# Patient Record
Sex: MALE | Race: WHITE
[De-identification: names, ages, dates, MRNs, and addresses within clinical notes are randomized per-mention and may not be internally consistent; named-entity substitution may affect disease eponyms.]

---

## 2018-11-20 ENCOUNTER — HOSPITAL ENCOUNTER (EMERGENCY)
Dept: HOSPITAL 14 - H.ER | Age: 33
Discharge: HOME | End: 2018-11-20
Payer: MEDICAID

## 2018-11-20 VITALS
DIASTOLIC BLOOD PRESSURE: 57 MMHG | RESPIRATION RATE: 16 BRPM | TEMPERATURE: 98.4 F | SYSTOLIC BLOOD PRESSURE: 111 MMHG | OXYGEN SATURATION: 99 % | HEART RATE: 98 BPM

## 2018-11-20 DIAGNOSIS — M43.6: Primary | ICD-10-CM

## 2018-11-20 PROCEDURE — 72070 X-RAY EXAM THORAC SPINE 2VWS: CPT

## 2018-11-20 PROCEDURE — 72040 X-RAY EXAM NECK SPINE 2-3 VW: CPT

## 2018-11-20 PROCEDURE — 99283 EMERGENCY DEPT VISIT LOW MDM: CPT

## 2018-11-20 PROCEDURE — 96372 THER/PROPH/DIAG INJ SC/IM: CPT

## 2018-11-20 NOTE — RAD
Date of service: 



11/20/2018



PROCEDURE:  Cervical Spine Radiographs.



HISTORY:

Pain. 



COMPARISON:

None available.



FINDINGS:



BONES:





There is normal alignment of the cervical vertebral bodies.  There is 

reversal of normal cervical lordosis.  Vertebral height is normal. 

Bone mineralization is normal. There is no acute fracture or 

traumatic anterior listhesis. The craniocervical junction is normal.  

The atlantoaxial joint normal. 



DISC SPACES:

There is moderate degenerative disc disease at C6-7 with anterior 

spurring, severe reduced disc height and facet arthropathy.  The 

remaining disc heights are maintained. 



SOFT TISSUES:

Normal. No prevertebral soft tissue swelling. 



OTHER FINDINGS:

None.



IMPRESSION:

Moderate degenerative disc disease at C6-7.



Reversal of normal cervical lordosis may be related to muscle spasm.

## 2018-11-20 NOTE — RAD
Date of service: 



11/20/2018



HISTORY:

pain







COMPARISON:

No prior.



FINDINGS:



BONES:

There is normal alignment of the thoracic vertebral bodies.  There is 

normal thoracic kyphosis.  Bone mineralization is normal.  There is 

no acute fracture. 



DISC SPACES:

Normal.



SOFT TISSUES:

Normal.



OTHER FINDINGS:

None.



IMPRESSION:

Normal radiographs of the thoracic spine.

## 2018-11-20 NOTE — ED PDOC
HPI: Back


Time Seen by Provider: 11/20/18 17:09


Chief Complaint (Nursing): Back Pain


History Per: Patient


Additional Complaint(s): 





Pt. states for the past week he's had neck and upper back pain described as a 

stiffness. States he's been getting this pain since 2012 intermittently but has 

never sought medical attention till today. Denies trauma, chest pain, SOB, 

hemoptysis, fever, cough, hematuria, incontinence. 





Past Medical History


Reviewed: Historical Data, Nursing Documentation, Vital Signs


Vital Signs: 


                                Last Vital Signs











Temp  98.4 F   11/20/18 16:54


 


Pulse  98 H  11/20/18 16:54


 


Resp  16   11/20/18 16:54


 


BP  111/57 L  11/20/18 16:54


 


Pulse Ox  99   11/20/18 16:54














- Surgical History


Surgical History: No Surg Hx





- Family History


Family History: States: No Known Family Hx





- Home Medications


Home Medications: 


                                Ambulatory Orders











 Medication  Instructions  Recorded


 


Naproxen [Naprosyn] 500 mg PO BID PRN #30 tab 06/22/15


 


Cyclobenzaprine [Cyclobenzaprine 10 mg PO Q8 PRN #10 tab 11/20/18





HCl]  


 


Naproxen [Naprosyn] 500 mg PO BID PRN #10 tab 11/20/18














- Allergies


Allergies/Adverse Reactions: 


                                    Allergies











Allergy/AdvReac Type Severity Reaction Status Date / Time


 


No Known Allergies Allergy   Verified 11/20/18 16:54














Review of Systems


ROS Statement: Except As Marked, All Systems Reviewed And Found Negative


Musculoskeletal: Positive for: Back Pain





Physical Exam





- Physical Exam


Appears: Positive for: Well, Non-toxic, No Acute Distress


Skin: Positive for: Normal Color, Warm.  Negative for: Rash


Eye Exam: Positive for: Normal appearance


Cardiovascular/Chest: Positive for: Regular Rate, Rhythm, Chest Non Tender


Respiratory: Positive for: Normal Breath Sounds.  Negative for: Respiratory 

Distress


Gastrointestinal/Abdominal: Positive for: Soft.  Negative for: Tenderness


Back: Positive for: Normal Inspection, Decreased ROM, Muscle Spasm (b/l 

paracervical and parathoracic).  Negative for: L CVA Tenderness, R CVA 

Tenderness, Vertebral Tenderness (entire spine including c-spine)


Extremity: Positive for: Other (equal  strenght b/l)


Neurologic/Psych: Positive for: Alert, Oriented (x3).  Negative for: Aphasia, 

Facial Droop





- ECG


O2 Sat by Pulse Oximetry: 99





- Radiology


X-Ray: Read By Radiologist (C-spine, thoracic spine)


X-Ray Interpretation: Other (moderate degenerate disc disease C6-7; reversal 

lordosis likely related to muscle spasm)





- Progress


Re-evaluation Time: 19:13 (Advised to f/u with [facility] for possible MRI. )


Condition: Re-examined, Improved





Disposition





- Clinical Impression


Clinical Impression: 


 Torticollis








- Patient ED Disposition


Is Patient to be Admitted: No





- Disposition


Referrals: 


[facility] [Outside]


Disposition: Routine/Home


Disposition Time: 19:13


Condition: IMPROVED


Additional Instructions: 


FOLLOW UP WITH [facility] FOR FURTHER EVALUATION BUT RETURN TO ED IMMEDIATELY IF 

SYMPTOMS WORSEN.





HENRY VERDE, thank you for letting us take care of you today. Your provider was 

Marva Liang MD and you were treated for NECK PAIN. The emergency 

medical care you received today was directed at your acute symptoms. If you were

 prescribed any medication, please fill it and take as directed. It may take 

several days for your symptoms to resolve. Return to the Emergency Department if

 your symptoms worsen, do not improve, or if you have any other problems.





Please contact your doctor or call one of the physicians/clinics you have been 

referred to that are listed on the Patient Visit Information form that is inclu

ded in your discharge packet. Bring any paperwork you were given at discharge 

with you along with any medications you are taking to your follow up visit. Our 

treatment cannot replace ongoing medical care by a primary care provider outside

 of the emergency department.





Thank you for allowing the [email or web address] team to be part of your care today.








If you had an X-Ray or CT scan: A Radiologist will review the ED reading if any 

change in treatment is needed we will contact you.***





If you had a blood, urine, or wound culture: It will take several days for the 

results, if any change in treatment is needed we will contact you.***





If you had an STI test: It will take 48 hours for the results. Please call after

 1 week if you have not heard back.***


Prescriptions: 


Cyclobenzaprine [Cyclobenzaprine HCl] 10 mg PO Q8 PRN #10 tab


 PRN Reason: Muscle Spasm


Naproxen [Naprosyn] 500 mg PO BID PRN #10 tab


 PRN Reason: Pain


Instructions:  Torticollis (DC)


Forms:  [facility] (English)

## 2018-11-26 ENCOUNTER — HOSPITAL ENCOUNTER (INPATIENT)
Dept: HOSPITAL 14 - H.ER | Age: 33
LOS: 4 days | Discharge: NURSING, REHAB OR LONG-TERM CARE | DRG: 836 | End: 2018-11-30
Attending: INTERNAL MEDICINE | Admitting: INTERNAL MEDICINE
Payer: MEDICAID

## 2018-11-26 VITALS — BODY MASS INDEX: 18.9 KG/M2

## 2018-11-26 DIAGNOSIS — M43.6: ICD-10-CM

## 2018-11-26 DIAGNOSIS — M46.42: ICD-10-CM

## 2018-11-26 DIAGNOSIS — R32: ICD-10-CM

## 2018-11-26 DIAGNOSIS — F17.210: ICD-10-CM

## 2018-11-26 DIAGNOSIS — G06.1: Primary | ICD-10-CM

## 2018-11-26 DIAGNOSIS — M46.22: ICD-10-CM

## 2018-11-26 DIAGNOSIS — F12.90: ICD-10-CM

## 2018-11-26 DIAGNOSIS — G82.20: ICD-10-CM

## 2018-11-26 LAB
ALBUMIN SERPL-MCNC: 3.6 G/DL (ref 3.5–5)
ALBUMIN/GLOB SERPL: 0.9 {RATIO} (ref 1–2.1)
ALT SERPL-CCNC: 26 U/L (ref 21–72)
APTT BLD: 26.1 SECONDS (ref 25.6–37.1)
AST SERPL-CCNC: 46 U/L (ref 17–59)
BASOPHILS # BLD AUTO: 0 K/UL (ref 0–0.2)
BASOPHILS NFR BLD: 0.3 % (ref 0–2)
BILIRUB UR-MCNC: NEGATIVE MG/DL
BUN SERPL-MCNC: 11 MG/DL (ref 9–20)
CALCIUM SERPL-MCNC: 9.1 MG/DL (ref 8.4–10.2)
COLOR UR: (no result)
EOSINOPHIL # BLD AUTO: 0.2 K/UL (ref 0–0.7)
EOSINOPHIL NFR BLD: 1.8 % (ref 0–4)
ERYTHROCYTE [DISTWIDTH] IN BLOOD BY AUTOMATED COUNT: 13.2 % (ref 11.5–14.5)
GFR NON-AFRICAN AMERICAN: > 60
GLUCOSE UR STRIP-MCNC: (no result) MG/DL
HGB BLD-MCNC: 12.4 G/DL (ref 12–18)
INR PPP: 1.2
LEUKOCYTE ESTERASE UR-ACNC: (no result) LEU/UL
LYMPHOCYTE: 13 % (ref 20–50)
LYMPHOCYTES # BLD AUTO: 1.1 K/UL (ref 1–4.3)
LYMPHOCYTES NFR BLD AUTO: 9.4 % (ref 20–40)
MCH RBC QN AUTO: 30.5 PG (ref 27–31)
MCHC RBC AUTO-ENTMCNC: 32.6 G/DL (ref 33–37)
MCV RBC AUTO: 93.4 FL (ref 80–94)
MONOCYTE: 11 % (ref 0–10)
MONOCYTES # BLD: 1 K/UL (ref 0–0.8)
MONOCYTES NFR BLD: 8 % (ref 0–10)
NEUTROPHILS # BLD: 9.8 K/UL (ref 1.8–7)
NEUTROPHILS NFR BLD AUTO: 76 % (ref 42–75)
NEUTROPHILS NFR BLD AUTO: 80.5 % (ref 50–75)
NRBC BLD AUTO-RTO: 0 % (ref 0–0)
OVALOCYTES BLD QL SMEAR: SLIGHT
PH UR STRIP: 5 [PH] (ref 5–8)
PLATELET # BLD EST: NORMAL 10*3/UL
PLATELET # BLD: 370 K/UL (ref 130–400)
PMV BLD AUTO: 7.3 FL (ref 7.2–11.7)
PROT UR STRIP-MCNC: NEGATIVE MG/DL
PROTHROMBIN TIME: 14.1 SECONDS (ref 9.8–13.1)
RBC # BLD AUTO: 4.06 MIL/UL (ref 4.4–5.9)
RBC # UR STRIP: NEGATIVE /UL
SP GR UR STRIP: 1.03 (ref 1–1.03)
SQUAMOUS EPITHIAL: 1 /HPF (ref 0–5)
TEARDROP CELLS: SLIGHT
TOTAL CELLS COUNTED BLD: 100
URINE CLARITY: (no result)
UROBILINOGEN UR-MCNC: 4 MG/DL (ref 0.2–1)
WBC # BLD AUTO: 12.2 K/UL (ref 4.8–10.8)

## 2018-11-26 RX ADMIN — POTASSIUM CHLORIDE, DEXTROSE MONOHYDRATE AND SODIUM CHLORIDE SCH MLS/HR: 150; 5; 450 INJECTION, SOLUTION INTRAVENOUS at 21:04

## 2018-11-26 RX ADMIN — DEXAMETHASONE SODIUM PHOSPHATE SCH MG: 4 INJECTION, SOLUTION INTRAMUSCULAR; INTRAVENOUS at 21:06

## 2018-11-26 NOTE — CP.PCM.CON
History of Present Illness





- History of Present Illness


History of Present Illness: 





dictated





paraplegia x 4 days - c4 -7 epidural abscess


rec posterior cervical decompression as best chance to preserve upper extremity 

function


do not believe LE/B/B function can be restored at this late stage





Pt wants to think over recommendation/disc with family


await decision


agree with Abcs,steroids,collar in interem





Past Patient History





- Past Social History


Alcohol: Social


Drugs: Other (hx of IVDA)





- CARDIAC


Hx Cardiac Disorders: No





- PULMONARY


Hx Respiratory Disorders: No





- PSYCHIATRIC


Hx Substance Use: Yes





- SURGICAL HISTORY


Hx Surgeries: Yes


Other/Comment: hernia surgery





Meds


Allergies/Adverse Reactions: 


                                    Allergies











Allergy/AdvReac Type Severity Reaction Status Date / Time


 


No Known Allergies Allergy   Verified 11/20/18 16:54














Results





- Vital Signs


Recent Vital Signs: 


                                Last Vital Signs











Temp  99.3 F   11/26/18 14:35


 


Pulse  60   11/26/18 14:35


 


Resp  18   11/26/18 14:35


 


BP  113/62   11/26/18 14:35


 


Pulse Ox  96   11/26/18 15:17














- Labs


Result Diagrams: 


                                 11/26/18 10:28





                                 11/26/18 10:28


Labs: 


                         Laboratory Results - last 24 hr











  11/26/18 11/26/18 11/26/18





  10:28 10:28 10:28


 


WBC  12.2 H  


 


RBC  4.06 L  


 


Hgb  12.4  


 


Hct  37.9  


 


MCV  93.4  


 


MCH  30.5  


 


MCHC  32.6 L  


 


RDW  13.2  


 


Plt Count  370  


 


MPV  7.3  


 


Neut % (Auto)  80.5 H  


 


Lymph % (Auto)  9.4 L  


 


Mono % (Auto)  8.0  


 


Eos % (Auto)  1.8  


 


Baso % (Auto)  0.3  


 


Neut # (Auto)  9.8 H  


 


Lymph # (Auto)  1.1  


 


Mono # (Auto)  1.0 H  


 


Eos # (Auto)  0.2  


 


Baso # (Auto)  0.0  


 


Neutrophils % (Manual)  76 H  


 


Lymphocytes % (Manual)  13 L  


 


Monocytes % (Manual)  11 H  


 


Platelet Estimate  Normal  


 


Tear Drop Cells  Slight  


 


Ovalocytes  Slight  


 


ESR   


 


PT    14.1 H


 


INR    1.2


 


APTT    26.1


 


Sodium   137 


 


Potassium   4.0 


 


Chloride   101 


 


Carbon Dioxide   26 


 


Anion Gap   14 


 


BUN   11 


 


Creatinine   0.8 


 


Est GFR ( Amer)   > 60 


 


Est GFR (Non-Af Amer)   > 60 


 


Random Glucose   106 


 


Calcium   9.1 


 


Total Bilirubin   0.5 


 


AST   46 


 


ALT   26 


 


Alkaline Phosphatase   56 


 


Total Protein   7.5 


 


Albumin   3.6 


 


Globulin   3.9 


 


Albumin/Globulin Ratio   0.9 L 


 


Urine Color   


 


Urine Clarity   


 


Urine pH   


 


Ur Specific Gravity   


 


Urine Protein   


 


Urine Glucose (UA)   


 


Urine Ketones   


 


Urine Blood   


 


Urine Nitrate   


 


Urine Bilirubin   


 


Urine Urobilinogen   


 


Ur Leukocyte Esterase   


 


Urine RBC (Auto)   


 


Urine Microscopic WBC   


 


Ur Squamous Epith Cells   


 


Urine Opiates Screen   


 


Urine Methadone Screen   


 


Ur Barbiturates Screen   


 


Ur Phencyclidine Scrn   


 


Ur Amphetamines Screen   


 


U Benzodiazepines Scrn   


 


U Oth Cocaine Metabols   


 


U Cannabinoids Screen   














  11/26/18 11/26/18 11/26/18





  14:00 14:38 14:38


 


WBC   


 


RBC   


 


Hgb   


 


Hct   


 


MCV   


 


MCH   


 


MCHC   


 


RDW   


 


Plt Count   


 


MPV   


 


Neut % (Auto)   


 


Lymph % (Auto)   


 


Mono % (Auto)   


 


Eos % (Auto)   


 


Baso % (Auto)   


 


Neut # (Auto)   


 


Lymph # (Auto)   


 


Mono # (Auto)   


 


Eos # (Auto)   


 


Baso # (Auto)   


 


Neutrophils % (Manual)   


 


Lymphocytes % (Manual)   


 


Monocytes % (Manual)   


 


Platelet Estimate   


 


Tear Drop Cells   


 


Ovalocytes   


 


ESR  61 H  


 


PT   


 


INR   


 


APTT   


 


Sodium   


 


Potassium   


 


Chloride   


 


Carbon Dioxide   


 


Anion Gap   


 


BUN   


 


Creatinine   


 


Est GFR ( Amer)   


 


Est GFR (Non-Af Amer)   


 


Random Glucose   


 


Calcium   


 


Total Bilirubin   


 


AST   


 


ALT   


 


Alkaline Phosphatase   


 


Total Protein   


 


Albumin   


 


Globulin   


 


Albumin/Globulin Ratio   


 


Urine Color   Paloma 


 


Urine Clarity   Slighty-cloudy 


 


Urine pH   5.0 


 


Ur Specific Gravity   1.026 


 


Urine Protein   Negative 


 


Urine Glucose (UA)   Neg 


 


Urine Ketones   Trace 


 


Urine Blood   Negative 


 


Urine Nitrate   Negative 


 


Urine Bilirubin   Negative 


 


Urine Urobilinogen   4.0 


 


Ur Leukocyte Esterase   Neg 


 


Urine RBC (Auto)   1 


 


Urine Microscopic WBC   2 


 


Ur Squamous Epith Cells   1 


 


Urine Opiates Screen    Positive H


 


Urine Methadone Screen    Negative


 


Ur Barbiturates Screen    Negative


 


Ur Phencyclidine Scrn    Negative


 


Ur Amphetamines Screen    Negative


 


U Benzodiazepines Scrn    Positive


 


U Oth Cocaine Metabols    Negative


 


U Cannabinoids Screen    Positive H

## 2018-11-26 NOTE — MRI
Date of service: 



11/26/2018



PROCEDURE:  MR CERVICAL SPINE WITHOUT CONTRAST



HISTORY:

BLE weakness



COMPARISON:

None available. 



TECHNIQUE:

Multiecho multiplanar sequences were performed through the cervical 

spine without the use of intravenous contrast. Examination was 

delayed to completion due to pain (primarily in the neck but also in 

the pelvis) with patient laying on the MR table with patient 

receiving pain control in the middle of the initial examination. 



FINDINGS:

There is a subtle reversal of the cervical curvature without fracture 

or spondylolisthesis appreciated.  Diffuse disc desiccation is 

identified however, fluid is seen in the posterior and central 

portion of the C6-7 intervertebral disc with edema associated with 

the surrounding endplates posteriorly, at the same level as the 

fluid.  Anterior disc and endplates appear unaffected at this level.  

Fluid is suggested at the posterior epidural space, and mildly 

anteriorly in a pattern suspicious for discitis osteomyelitis.  

Posterior epidural fluid extends as cephalad potentially as C4 and as 

caudad as T1. 



C2-C3:

No disc herniation, spinal canal stenosis or neural foraminal 

narrowing. 



C3-C4:

No  disc herniation, spinal canal stenosis or neural foraminal 

narrowing. 



C4-C5:

No disc herniation, spinal canal stenosis or neural foraminal 

narrowing. 



C5-C6:

While there is no disc herniation or neural foraminal stenosis 

identified, epidural fluid or soft tissue is seen circumferentially 

but more posteriorly and toward the left and anteriorly.   This is 

likely reflective of epidural abscess related to C6-7 discitis 

osteomyelitis.  The cervical cord appears flattened at the left side 

more so than the right.  



C6-C7:

No disc herniation or neural foraminal stenosis.  Presumed fluid or 

epidural soft tissue impresses the thecal sac and the cervical cord 

greater the left and right sides stenosing the central canal.  Trace 

reactive cord signal changes associated. There is a potential annular 

tear posteriorly at C6-7 disc though this is not definite. 



C7-T1:

No disc herniation although fluid/soft tissue impresses the cord at 

the upper to mid T1 level with this finding minimal at the inferior 

T1 endplate level.  No neural foraminal stenosis. 



OTHER FINDINGS:

None. 



IMPRESSION:

1. C6-7 intervertebral disc fluid with edema at C6 and C7 vertebral 

bodies related to the mid to posterior endplate distribution 

suspicious for discitis osteomyelitis. Epidural fluid is identified 

more posteriorly than anteriorly from at least C4-T1 levels with cord 

compressed at C6 and C7. Contrast MRI cervical spine can be utilized 

for added characterization. 



2. No definitive disc herniation appreciable though an annular tear 

is not excluded at the C6-7 disc posteriorly. 



3. Mild reversal cervical curvature. Definitive fracture or 

spondylolisthesis appreciable. 



Findings discussed with Dr. Martinez with written down and read back 

verification 11/26/2018 1:25 p.m..

## 2018-11-26 NOTE — RAD
Date of service: 



11/26/2018



HISTORY:

 Pre-op 



COMPARISON:

No prior. 



FINDINGS:



LUNGS:

No active pulmonary disease.



PLEURA:

No significant pleural effusion identified, no pneumothorax apparent.



CARDIOVASCULAR:

No atherosclerotic calcification present



Normal.



OSSEOUS STRUCTURES:

No significant abnormalities.



VISUALIZED UPPER ABDOMEN:

Normal.



OTHER FINDINGS:

None.



IMPRESSION:

No active disease.

## 2018-11-26 NOTE — CARD
--------------- APPROVED REPORT --------------





Date of service: 11/26/2018



EKG Measurement

Heart [id number]

MS [id number]

[id number]

[id number]

[id number]



<Conclusion>

Sinus bradycardia

Otherwise normal ECG

## 2018-11-26 NOTE — ED PDOC
HPI: Neurologic





- General


Time Seen by Provider: 11/26/18 10:03


Chief Complaint (Nursing): Lower Extremity Problem/Injury


Chief Complaint (Provider): bilateral hand weakness, unable to ambulate


Source: patient


Exam Limitations: no limitations





- History of Present Illness


Timing/Duration: other (x4 days)


Associated Symptoms: weakness (b/l hands), other


Allergies/Adverse Reactions: 


Allergies





No Known Allergies Allergy (Verified 11/20/18 16:54)


   








Home Medications: 


Ambulatory Orders





Cyclobenzaprine [Cyclobenzaprine HCl] 10 mg PO Q8 PRN #10 tab 11/20/18 


Naproxen [Naprosyn] 500 mg PO BID PRN #10 tab 11/20/18 








Additional Complaint(s): 





Fausto Ken is a 33 year old male, with no significant past medical history, who

 was brought to the emergency department by EMS complaining of weakness in both 

hands and inability to ambulate onset for x4 days. Patient also reports urinary 

incontinence and urge to defecate but not being able to, last bowel movement was

 a week ago. Patient states he was seen here a week ago for neck pain. He was 

given a shot and Valium in the ER, and was sent home with muscle relaxants. 

However, patient reports symptoms got progressively worst and has not been able 

to ambulate since Friday. He denies any trauma, headache or other medical 

complaints.





PMD: Kareen Penny





NIHSS Stroke Scale





- Date/Time Evaluation Performed


Date Performed: 11/26/18





Past Medical History


Reviewed: Historical Data, Nursing Documentation, Vital Signs


Vital Signs: 





                                Last Vital Signs











Temp  98.4 F   11/26/18 09:52


 


Pulse  70   11/26/18 09:52


 


Resp  19   11/26/18 09:52


 


BP  127/67   11/26/18 09:52


 


Pulse Ox  96   11/26/18 09:52














- Medical History


PMH: No Chronic Diseases





- Surgical History


Surgical History: Hernia Repair (umbilical)





- Family History


Family History: States: Unknown Family Hx





- Social History


Current smoker - smoking cessation education provided: Yes


Alcohol: Social


Drugs: Other (hx of IVDA)





- Home Medications


Home Medications: 


                                Ambulatory Orders











 Medication  Instructions  Recorded


 


Cyclobenzaprine [Cyclobenzaprine 10 mg PO Q8 PRN #10 tab 11/20/18





HCl]  


 


Naproxen [Naprosyn] 500 mg PO BID PRN #10 tab 11/20/18














- Allergies


Allergies/Adverse Reactions: 


                                    Allergies











Allergy/AdvReac Type Severity Reaction Status Date / Time


 


No Known Allergies Allergy   Verified 11/20/18 16:54














Review of Systems


ROS Statement: Except As Marked, All Systems Reviewed And Found Negative


Gastrointestinal: Positive for: Constipation


Genitourinary Male: Positive for: Incontinence (urinary)


Musculoskeletal: Positive for: Neck Pain


Neurological: Positive for: Weakness (b/l hands), Other (unable to ambulate).  

Negative for: Headache





Physical Exam





- Reviewed


Nursing Documentation Reviewed: Yes


Vital Signs Reviewed: Yes





- Physical Exam


Appears: Positive for: No Acute Distress


Head Exam: Positive for: ATRAUMATIC, NORMAL INSPECTION, NORMOCEPHALIC


Skin: Positive for: Normal Color, Warm, Dry


Eye Exam: Positive for: Normal appearance, EOMI, PERRL


ENT: Positive for: Normal ENT Inspection


Neck: Positive for: Normal, Painless ROM, Supple


Cardiovascular/Chest: Positive for: Regular Rate, Rhythm.  Negative for: Murmur


Respiratory: Positive for: Normal Breath Sounds.  Negative for: Respiratory 

Distress


Gastrointestinal/Abdominal: Positive for: Normal Exam, Soft.  Negative for: 

Tenderness, Guarding, Rebound


Back: Positive for: Normal Inspection.  Negative for: L CVA Tenderness, R CVA 

Tenderness, Vertebral Tenderness


Extremity: Negative for: Deformity


Neurologic/Psych: Positive for: Alert, Oriented, Motor/Sensory Deficits 

(Decreased  strength b/l. Muscle weakness to bilateral lower extremities), 

Other (Positive saddle anesthesia)





- Laboratory Results


Result Diagrams: 


                                 11/26/18 10:28





                                 11/26/18 10:28





- ECG


O2 Sat by Pulse Oximetry: 96 (RA)


Pulse Ox Interpretation: Normal





- Critical Care


Total Time (In Min): 75


Documented Critical Care: Time excludes all time spent performint seperately 

billable procedures





Medical Decision Making


Medical Decision Making: 





Time: 10:03


Initial Impression:





Initial Plan:





--CMP


--Urine dipstick


--CBC w/ differential


--PTT


--PT


--Spinal canal cervical w/o contrast [MRI]


--Spinal canal lumbar w/o contrast [MRI]


--Spinal canal thoracic w/o contrast [MRI]


--Urinalysis 


--Reevaluation





10:30


-Spoke with Dr. Anderson, she recommends a cervical collar and getting an MRI of the 

spine. 





11:15


-Patient was brought back from MRI, he is refusing secondary to neck pain. 

Ordered 4mg of Morphine.





11:30


-Patient refusing MRI again stating he is not sure. He agreed to try for the MRI

 given Ativan 2 mg.


-Pt. placed on monitor.





_______________________________________________________________________________

___________________


13:31


Cervical spine MRI


FINDINGS:


There is a subtle reversal of the cervical curvature without fracture or 

spondylolisthesis appreciated.  Diffuse disc desiccation is identified however, 

fluid is seen in the posterior and central portion of the C6-7 intervertebral 

disc with edema associated with the surrounding endplates posteriorly, at the 

same level as the fluid.  Anterior disc and endplates appear unaffected at this 

level.  Fluid is suggested at the posterior epidural space, and mildly 

anteriorly in a pattern suspicious for discitis osteomyelitis.  Posterior 

epidural fluid extends as cephalad potentially as C4 and as caudad as T1. 





C2-C3:


No disc herniation, spinal canal stenosis or neural foraminal narrowing. 





C3-C4:


No  disc herniation, spinal canal stenosis or neural foraminal narrowing. 





C4-C5:


No disc herniation, spinal canal stenosis or neural foraminal narrowing. 





C5-C6:


While there is no disc herniation or neural foraminal stenosis identified, 

epidural fluid or soft tissue is seen circumferentially but more posteriorly and

 toward the left and anteriorly.   This is likely reflective of epidural abscess

 related to C6-7 discitis osteomyelitis.  The cervical cord appears flattened at

 the left side more so than the right.  





C6-C7:


No disc herniation or neural foraminal stenosis.  Presumed fluid or epidural 

soft tissue impresses the thecal sac and the cervical cord greater the left and 

right sides stenosing the central canal.  Trace reactive cord signal changes 

associated. There is a potential annular tear posteriorly at C6-7 disc though 

this is not definite. 





C7-T1:


No disc herniation although fluid/soft tissue impresses the cord at the upper to

 mid T1 level with this finding minimal at the inferior T1 endplate level.  No 

neural foraminal stenosis. 





OTHER FINDINGS:


None. 





IMPRESSION:


1. C6-7 intervertebral disc fluid with edema at C6 and C7 vertebral bodies 

related to the mid to posterior endplate distribution suspicious for discitis 

osteomyelitis. Epidural fluid is identified more posteriorly than anteriorly 

from at least C4-T1 levels with cord compressed at C6 and C7. Contrast MRI 

cervical spine can be utilized for added characterization. 





2. No definitive disc herniation appreciable though an annular tear is not 

excluded at the C6-7 disc posteriorly. 





3. Mild reversal cervical curvature. Definitive fracture or spondylolisthesis 

appreciable. 





Findings discussed with Dr. Martinez with written down and read back verification 

11/26/2018 1:25 p.m..





_______________________________________________

_______________________________________________________





13:30


-Paged Dr. Esqueda, ID.





13:35


-Paged Dr. Monzon, neurosurgery.





________________________________________________________________________

_______________________________


13:38


Lumbar Spine MRI


FINDINGS:


Normal lumbar lordosis.





Vertebral body heights are preserved.





Marrow signal unremarkable.





Conus medullaris unremarkable at the level of L2 vertebral body. 





Prevertebral and paraspinal soft tissues are unremarkable.





T12-L1:


No disc herniation, spinal canal stenosis or neural foraminal narrowing. 





L1-2:


No disc herniation, spinal canal stenosis or neural foraminal narrowing. 





L2-3:


No disc herniation, spinal canal stenosis or neural foraminal narrowing. 





L3-4:


No disc herniation, spinal canal stenosis or neural foraminal narrowing. 





L4-5:


No disc herniation, spinal canal stenosis or neural foraminal narrowing.  

Minimal disc bulge and mild-to-moderate facet joint degenerative change. 





L5-S1:


There is a small right paracentral disc protrusion without indenting the ventral

 thecal sac or causing significant stenosis.  Mild-to-moderate facet joint 

degenerative changes are seen symmetrically.  No definite neural foraminal 

stenosis bilaterally. 





OTHER FINDINGS:


None. 





IMPRESSION:


1. Small right paracentral disc protrusion L5-S1 without significant stenosis 

resulting. 





2. Minimal disc bulge L4-5 without stenosis. 





3. Widely patent central canal and bilateral neural foramina throughout.





 

________________________________________________________________________________


_______________________








13:40


-Spoke with Dr. Monozn, recommends ESR, CRP, preop orders.








__________________________

______________________________________________________________________________


13:42


Thoracic Spine MRI


FINDINGS:





ALIGNMENT:


Normal thoracic spinal alignment. Normal thoracic kyphosis.





VERTEBRA:


Vertebral body height are preserved.  No fracture appreciable.





MARROW:


Marrow signal unremarkable.





PARASPINAL SOFT TISSUES:


Unremarkable.





CORD:


Unremarkable thoracic cord. No volume loss, signal abnormality or syrinx.





DISCS:


No disc herniation, spinal canal stenosis, or neuroforaminal narrowing.





OTHER FINDINGS:


Edema is identified at the C7 vertebral body with the C6 not included in this 

exam.  Please see separate cervical spine MRI report 11/26/2018 for added 

details.





IMPRESSION:


Unremarkable non-contrast enhanced MRI of the thoracic spine.  Incidental edema 

at C7 vertebral body is detailed in separate cervical spine MRI also performed 

11/26/2018.  Please see separate report.





Findings discussed with Dr. Martinez with written down and read back verification 

11/26/2018 1:44 p.m..


 

________________________________________________________________________________


______________________








14:20


-Case discussed with Dr. Esqueda, recommends Vancomycin 1 g q8h and Cefepime 2 g 

q8h.








14:36


CXR


FINDINGS:





LUNGS:


No active pulmonary disease.





PLEURA:


No significant pleural effusion identified, no pneumothorax apparent.





CARDIOVASCULAR:


No atherosclerotic calcification present





Normal.





OSSEOUS STRUCTURES:


No significant abnormalities.





VISUALIZED UPPER ABDOMEN:


Normal.





OTHER FINDINGS:


None.





IMPRESSION:


No active disease.











-------

------------------------------------------------------------------------------





Scribe Attestation:


Documented by Binu Ambrosio, acting as a scribe for Magdalena Martinez MD.





Provider Scribe Attestation:


All medical record entries made by the Scribe were at my direction and per

sonally dictated by me. I have reviewed the chart and agree that the record 

accurately reflects my personal performance of the history, physical exam, 

medical decision making, and the department course for this patient. I have also

 personally directed, reviewed, and agree with the discharge instructions and 

disposition.





Disposition





- Clinical Impression


Clinical Impression: 


 Epidural abscess, Discitis of cervical region, Osteomyelitis of cervical spine








- Patient ED Disposition


Is Patient to be Admitted: Yes





- Disposition


Disposition Time: 14:15


Condition: GUARDED





- Pt Status Changed To:


Hospital Disposition Of: Inpatient





- Admit Certification


Admit to Inpatient:: After my assessment, the patient will require 

hospitalization for at least two midnights.  This is because of the severity of 

symptoms shown, intensity of services needed, and/or the medical risk in this 

patient being treated as an outpatient.





- POA


Present On Arrival: None

## 2018-11-26 NOTE — MRI
Date of service: 



11/26/2018



PROCEDURE:  MR THORACIC SPINE WITHOUT CONTRAST



HISTORY:

BLE weakness



COMPARISON:

None available. 



TECHNIQUE:

Multiecho multiplanar sequences were performed through the thoracic 

spine without the use of intravenous contrast.



FINDINGS:



ALIGNMENT:

Normal thoracic spinal alignment. Normal thoracic kyphosis.



VERTEBRA:

Vertebral body height are preserved.  No fracture appreciable.



MARROW:

Marrow signal unremarkable.



PARASPINAL SOFT TISSUES:

Unremarkable.



CORD:

Unremarkable thoracic cord. No volume loss, signal abnormality or 

syrinx.



DISCS:

No disc herniation, spinal canal stenosis, or neuroforaminal 

narrowing.



OTHER FINDINGS:

Edema is identified at the C7 vertebral body with the C6 not included 

in this exam.  Please see separate cervical spine MRI report 

11/26/2018 for added details.



IMPRESSION:

Unremarkable non-contrast enhanced MRI of the thoracic spine.  

Incidental edema at C7 vertebral body is detailed in separate 

cervical spine MRI also performed 11/26/2018.  Please see separate 

report.



Findings discussed with Dr. Martinez with written down and read back 

verification 11/26/2018 1:44 p.m..

## 2018-11-26 NOTE — CP.CCUPN
CCU Subjective





- Physician Review


Events Since Last Encounter (Free Text): 





11/26/18 18:42


The patient was Seen and examined by me at the bedside


Medical records reviewed and Management issues were discussed and formulated 

with the house staff.


Events reviewed


 


Mr Ken is a 33 Years old active smoker Male with no significant Past medical 

history 


Who was brought to the Emergency department by EMS with complaint of Bilateral 

lower extremities Weakness, numbness, weakness in both hands 


and unable to ambulate for the past 4 days.


Patient also reports urinary incontinence and urge to defecate but not being 

able to, last bowel movement was a week ago. 


Also admits fall at home


Patient was seen in the ER week ago for neck pain, he was given a shot, NSAIDs 

and muscle relaxant. 


Pt placed on  a cervical collar and getting and underwent MRI of the spine.


In the ER, he underwent MRI revealed C6-7 intervertebral disc fluid with edema 

at C6 and C7 vertebral bodies related to the mid to posterior endplate 

distribution suspicious for discitis osteomyelitis. 


Epidural fluid is identified more posteriorly than anteriorly from at least C4-

T1 levels with cord compressed at C6 and C7.  


Patient was evaluated by neurology, ID and neurosurgery


Pt was started on IV Vanco and Cefapime 


Awake, Comfortable, NAD


Pt AAO x3. 


Afebrile, NSR on the monitor








Social Hx:  


Tobacco - Active smoker 1PPD x 8 years


Alcohol - occasional


Illicit Drugs - No IVDA, + marijuana





CCU Objective





- Vital Signs / Intake & Output


Vital Signs (Last 4 hours): 


Vital Signs











  Pulse Resp BP Pulse Ox


 


 11/26/18 18:14  71  16  111/66 


 


 11/26/18 18:12  71  16  111/66  99


 


 11/26/18 16:47     96











Intake and Output (Last 8hrs): 


                                 Intake & Output











 11/26/18 11/26/18 11/26/18





 06:59 14:59 22:59


 


Weight  173 lb 














- Physical Exam


Head: Positive for: Atraumatic, Normocephalic.  Negative for: Tenderness, 

Contusion, Swelling


Pupils: Positive for: PERRL.  Negative for: Sluggish, Non-Reactive, Pinpoint


Extroacular Muscles: Positive for: EOMI


Conjunctiva: Positive for: Normal.  Negative for: Injected, Icteric


Neck: Positive for: Meningeal Signs, MIDLINE TENDERNESS, Paraspinal Tenderness, 

Trachea Midline.  Negative for: Bruit


Respiratory/Chest: Positive for: Clear to Auscultation, Good Air Exchange.  

Negative for: Respiratory Distress, Accessory Muscle Use, Wheezes, Decreased 

Breath Sounds, Rales, Retracting


Cardiovascular: Positive for: Regular Rate and Rhythm, Normal S1, S2, Peripheal 

Pulses Present.  Negative for: Murmurs


Abdomen: Positive for: Normal Bowel Sounds.  Negative for: Tenderness, 

Distention, Peritoneal Signs


Neurological: Positive for: GCS=15, CN II-XII Intact, Speech Normal, Memory 

Normal, Other (Decreased  strength).  Negative for: Motor Func Grossly 

Intact (Muscle weakness to bilateral lower extremities), Normal Sensory Function

(Decreased sensations to bilateral lower extremities), Norm Deep Tendon 

Reflexes, Gait Normal





- Medications


Active Medications: 


Active Medications











Generic Name Dose Route Start Last Admin





  Trade Name Freq  PRN Reason Stop Dose Admin


 


Dexamethasone  4 mg  11/26/18 22:00  





  Decadron Inj  IVP   





  Q6 SERENA   





     





     





     





     














- Patient Studies


Lab Studies: 


                                   Lab Studies











  11/26/18 11/26/18 11/26/18 Range/Units





  15:53 14:38 14:38 


 


WBC     (4.8-10.8)  K/uL


 


RBC     (4.40-5.90)  Mil/uL


 


Hgb     (12.0-18.0)  g/dL


 


Hct     (35.0-51.0)  %


 


MCV     (80.0-94.0)  fl


 


MCH     (27.0-31.0)  pg


 


MCHC     (33.0-37.0)  g/dL


 


RDW     (11.5-14.5)  %


 


Plt Count     (130-400)  K/uL


 


MPV     (7.2-11.7)  fl


 


Neut % (Auto)     (50.0-75.0)  %


 


Lymph % (Auto)     (20.0-40.0)  %


 


Mono % (Auto)     (0.0-10.0)  %


 


Eos % (Auto)     (0.0-4.0)  %


 


Baso % (Auto)     (0.0-2.0)  %


 


Neut # (Auto)     (1.8-7.0)  K/uL


 


Lymph # (Auto)     (1.0-4.3)  K/uL


 


Mono # (Auto)     (0.0-0.8)  K/uL


 


Eos # (Auto)     (0.0-0.7)  K/uL


 


Baso # (Auto)     (0.0-0.2)  K/uL


 


Neutrophils % (Manual)     (42-75)  %


 


Lymphocytes % (Manual)     (20-50)  %


 


Monocytes % (Manual)     (0-10)  %


 


Platelet Estimate     (NORMAL)  


 


Tear Drop Cells     


 


Ovalocytes     


 


ESR     (0-15)  mm/hr


 


PT     (9.8-13.1)  Seconds


 


INR     


 


APTT     (25.6-37.1)  Seconds


 


Sodium     (132-148)  mmol/l


 


Potassium     (3.6-5.0)  MMOL/L


 


Chloride     ()  mmol/L


 


Carbon Dioxide     (22-30)  mmol/L


 


Anion Gap     (10-20)  


 


BUN     (9-20)  mg/dl


 


Creatinine     (0.8-1.5)  mg/dl


 


Est GFR (African Amer)     


 


Est GFR (Non-Af Amer)     


 


Random Glucose     ()  mg/dL


 


Calcium     (8.4-10.2)  mg/dL


 


Total Bilirubin     (0.2-1.3)  mg/dl


 


AST     (17-59)  U/L


 


ALT     (21-72)  U/L


 


Alkaline Phosphatase     ()  U/L


 


Total Protein     (6.3-8.2)  G/DL


 


Albumin     (3.5-5.0)  g/dL


 


Globulin     (2.2-3.9)  gm/dL


 


Albumin/Globulin Ratio     (1.0-2.1)  


 


Urine Color    Paloma  (YELLOW)  


 


Urine Clarity    Slighty-cloudy  (Clear)  


 


Urine pH    5.0  (5.0-8.0)  


 


Ur Specific Gravity    1.026  (1.003-1.030)  


 


Urine Protein    Negative  (NEGATIVE)  mg/dL


 


Urine Glucose (UA)    Neg  (Normal)  mg/dL


 


Urine Ketones    Trace  (NEGATIVE)  mg/dL


 


Urine Blood    Negative  (NEGATIVE)  


 


Urine Nitrate    Negative  (NEGATIVE)  


 


Urine Bilirubin    Negative  (NEGATIVE)  


 


Urine Urobilinogen    4.0  (0.2-1.0)  mg/dL


 


Ur Leukocyte Esterase    Neg  (Negative)  Franky/uL


 


Urine RBC (Auto)    1  (0-3)  /hpf


 


Urine Microscopic WBC    2  (0-5)  /hpf


 


Ur Squamous Epith Cells    1  (0-5)  /hpf


 


Urine Opiates Screen   Positive H   (NEGATIVE)  


 


Urine Methadone Screen   Negative   (NEGATIVE)  


 


Ur Barbiturates Screen   Negative   (NEGATIVE)  


 


Ur Phencyclidine Scrn   Negative   (NEGATIVE)  


 


Ur Amphetamines Screen   Negative   (NEGATIVE)  


 


U Benzodiazepines Scrn   Positive   (NEGATIVE)  


 


U Oth Cocaine Metabols   Negative   (NEGATIVE)  


 


U Cannabinoids Screen   Positive H   (NEGATIVE)  


 


HIV-1 Ab Rapid Screen  Non reactive    (NON REAC)  














  11/26/18 11/26/18 11/26/18 Range/Units





  14:00 10:28 10:28 


 


WBC     (4.8-10.8)  K/uL


 


RBC     (4.40-5.90)  Mil/uL


 


Hgb     (12.0-18.0)  g/dL


 


Hct     (35.0-51.0)  %


 


MCV     (80.0-94.0)  fl


 


MCH     (27.0-31.0)  pg


 


MCHC     (33.0-37.0)  g/dL


 


RDW     (11.5-14.5)  %


 


Plt Count     (130-400)  K/uL


 


MPV     (7.2-11.7)  fl


 


Neut % (Auto)     (50.0-75.0)  %


 


Lymph % (Auto)     (20.0-40.0)  %


 


Mono % (Auto)     (0.0-10.0)  %


 


Eos % (Auto)     (0.0-4.0)  %


 


Baso % (Auto)     (0.0-2.0)  %


 


Neut # (Auto)     (1.8-7.0)  K/uL


 


Lymph # (Auto)     (1.0-4.3)  K/uL


 


Mono # (Auto)     (0.0-0.8)  K/uL


 


Eos # (Auto)     (0.0-0.7)  K/uL


 


Baso # (Auto)     (0.0-0.2)  K/uL


 


Neutrophils % (Manual)     (42-75)  %


 


Lymphocytes % (Manual)     (20-50)  %


 


Monocytes % (Manual)     (0-10)  %


 


Platelet Estimate     (NORMAL)  


 


Tear Drop Cells     


 


Ovalocytes     


 


ESR  61 H    (0-15)  mm/hr


 


PT   14.1 H   (9.8-13.1)  Seconds


 


INR   1.2   


 


APTT   26.1   (25.6-37.1)  Seconds


 


Sodium    137  (132-148)  mmol/l


 


Potassium    4.0  (3.6-5.0)  MMOL/L


 


Chloride    101  ()  mmol/L


 


Carbon Dioxide    26  (22-30)  mmol/L


 


Anion Gap    14  (10-20)  


 


BUN    11  (9-20)  mg/dl


 


Creatinine    0.8  (0.8-1.5)  mg/dl


 


Est GFR (African Amer)    > 60  


 


Est GFR (Non-Af Amer)    > 60  


 


Random Glucose    106  ()  mg/dL


 


Calcium    9.1  (8.4-10.2)  mg/dL


 


Total Bilirubin    0.5  (0.2-1.3)  mg/dl


 


AST    46  (17-59)  U/L


 


ALT    26  (21-72)  U/L


 


Alkaline Phosphatase    56  ()  U/L


 


Total Protein    7.5  (6.3-8.2)  G/DL


 


Albumin    3.6  (3.5-5.0)  g/dL


 


Globulin    3.9  (2.2-3.9)  gm/dL


 


Albumin/Globulin Ratio    0.9 L  (1.0-2.1)  


 


Urine Color     (YELLOW)  


 


Urine Clarity     (Clear)  


 


Urine pH     (5.0-8.0)  


 


Ur Specific Gravity     (1.003-1.030)  


 


Urine Protein     (NEGATIVE)  mg/dL


 


Urine Glucose (UA)     (Normal)  mg/dL


 


Urine Ketones     (NEGATIVE)  mg/dL


 


Urine Blood     (NEGATIVE)  


 


Urine Nitrate     (NEGATIVE)  


 


Urine Bilirubin     (NEGATIVE)  


 


Urine Urobilinogen     (0.2-1.0)  mg/dL


 


Ur Leukocyte Esterase     (Negative)  Franky/uL


 


Urine RBC (Auto)     (0-3)  /hpf


 


Urine Microscopic WBC     (0-5)  /hpf


 


Ur Squamous Epith Cells     (0-5)  /hpf


 


Urine Opiates Screen     (NEGATIVE)  


 


Urine Methadone Screen     (NEGATIVE)  


 


Ur Barbiturates Screen     (NEGATIVE)  


 


Ur Phencyclidine Scrn     (NEGATIVE)  


 


Ur Amphetamines Screen     (NEGATIVE)  


 


U Benzodiazepines Scrn     (NEGATIVE)  


 


U Oth Cocaine Metabols     (NEGATIVE)  


 


U Cannabinoids Screen     (NEGATIVE)  


 


HIV-1 Ab Rapid Screen     (NON REAC)  














  11/26/18 Range/Units





  10:28 


 


WBC  12.2 H  (4.8-10.8)  K/uL


 


RBC  4.06 L  (4.40-5.90)  Mil/uL


 


Hgb  12.4  (12.0-18.0)  g/dL


 


Hct  37.9  (35.0-51.0)  %


 


MCV  93.4  (80.0-94.0)  fl


 


MCH  30.5  (27.0-31.0)  pg


 


MCHC  32.6 L  (33.0-37.0)  g/dL


 


RDW  13.2  (11.5-14.5)  %


 


Plt Count  370  (130-400)  K/uL


 


MPV  7.3  (7.2-11.7)  fl


 


Neut % (Auto)  80.5 H  (50.0-75.0)  %


 


Lymph % (Auto)  9.4 L  (20.0-40.0)  %


 


Mono % (Auto)  8.0  (0.0-10.0)  %


 


Eos % (Auto)  1.8  (0.0-4.0)  %


 


Baso % (Auto)  0.3  (0.0-2.0)  %


 


Neut # (Auto)  9.8 H  (1.8-7.0)  K/uL


 


Lymph # (Auto)  1.1  (1.0-4.3)  K/uL


 


Mono # (Auto)  1.0 H  (0.0-0.8)  K/uL


 


Eos # (Auto)  0.2  (0.0-0.7)  K/uL


 


Baso # (Auto)  0.0  (0.0-0.2)  K/uL


 


Neutrophils % (Manual)  76 H  (42-75)  %


 


Lymphocytes % (Manual)  13 L  (20-50)  %


 


Monocytes % (Manual)  11 H  (0-10)  %


 


Platelet Estimate  Normal  (NORMAL)  


 


Tear Drop Cells  Slight  


 


Ovalocytes  Slight  


 


ESR   (0-15)  mm/hr


 


PT   (9.8-13.1)  Seconds


 


INR   


 


APTT   (25.6-37.1)  Seconds


 


Sodium   (132-148)  mmol/l


 


Potassium   (3.6-5.0)  MMOL/L


 


Chloride   ()  mmol/L


 


Carbon Dioxide   (22-30)  mmol/L


 


Anion Gap   (10-20)  


 


BUN   (9-20)  mg/dl


 


Creatinine   (0.8-1.5)  mg/dl


 


Est GFR (African Amer)   


 


Est GFR (Non-Af Amer)   


 


Random Glucose   ()  mg/dL


 


Calcium   (8.4-10.2)  mg/dL


 


Total Bilirubin   (0.2-1.3)  mg/dl


 


AST   (17-59)  U/L


 


ALT   (21-72)  U/L


 


Alkaline Phosphatase   ()  U/L


 


Total Protein   (6.3-8.2)  G/DL


 


Albumin   (3.5-5.0)  g/dL


 


Globulin   (2.2-3.9)  gm/dL


 


Albumin/Globulin Ratio   (1.0-2.1)  


 


Urine Color   (YELLOW)  


 


Urine Clarity   (Clear)  


 


Urine pH   (5.0-8.0)  


 


Ur Specific Gravity   (1.003-1.030)  


 


Urine Protein   (NEGATIVE)  mg/dL


 


Urine Glucose (UA)   (Normal)  mg/dL


 


Urine Ketones   (NEGATIVE)  mg/dL


 


Urine Blood   (NEGATIVE)  


 


Urine Nitrate   (NEGATIVE)  


 


Urine Bilirubin   (NEGATIVE)  


 


Urine Urobilinogen   (0.2-1.0)  mg/dL


 


Ur Leukocyte Esterase   (Negative)  Franky/uL


 


Urine RBC (Auto)   (0-3)  /hpf


 


Urine Microscopic WBC   (0-5)  /hpf


 


Ur Squamous Epith Cells   (0-5)  /hpf


 


Urine Opiates Screen   (NEGATIVE)  


 


Urine Methadone Screen   (NEGATIVE)  


 


Ur Barbiturates Screen   (NEGATIVE)  


 


Ur Phencyclidine Scrn   (NEGATIVE)  


 


Ur Amphetamines Screen   (NEGATIVE)  


 


U Benzodiazepines Scrn   (NEGATIVE)  


 


U Oth Cocaine Metabols   (NEGATIVE)  


 


U Cannabinoids Screen   (NEGATIVE)  


 


HIV-1 Ab Rapid Screen   (NON REAC)  








                         Laboratory Results - last 24 hr











  11/26/18 11/26/18 11/26/18





  10:28 10:28 10:28


 


WBC  12.2 H  


 


RBC  4.06 L  


 


Hgb  12.4  


 


Hct  37.9  


 


MCV  93.4  


 


MCH  30.5  


 


MCHC  32.6 L  


 


RDW  13.2  


 


Plt Count  370  


 


MPV  7.3  


 


Neut % (Auto)  80.5 H  


 


Lymph % (Auto)  9.4 L  


 


Mono % (Auto)  8.0  


 


Eos % (Auto)  1.8  


 


Baso % (Auto)  0.3  


 


Neut # (Auto)  9.8 H  


 


Lymph # (Auto)  1.1  


 


Mono # (Auto)  1.0 H  


 


Eos # (Auto)  0.2  


 


Baso # (Auto)  0.0  


 


Neutrophils % (Manual)  76 H  


 


Lymphocytes % (Manual)  13 L  


 


Monocytes % (Manual)  11 H  


 


Platelet Estimate  Normal  


 


Tear Drop Cells  Slight  


 


Ovalocytes  Slight  


 


ESR   


 


PT    14.1 H


 


INR    1.2


 


APTT    26.1


 


Sodium   137 


 


Potassium   4.0 


 


Chloride   101 


 


Carbon Dioxide   26 


 


Anion Gap   14 


 


BUN   11 


 


Creatinine   0.8 


 


Est GFR ( Amer)   > 60 


 


Est GFR (Non-Af Amer)   > 60 


 


Random Glucose   106 


 


Calcium   9.1 


 


Total Bilirubin   0.5 


 


AST   46 


 


ALT   26 


 


Alkaline Phosphatase   56 


 


Total Protein   7.5 


 


Albumin   3.6 


 


Globulin   3.9 


 


Albumin/Globulin Ratio   0.9 L 


 


Urine Color   


 


Urine Clarity   


 


Urine pH   


 


Ur Specific Gravity   


 


Urine Protein   


 


Urine Glucose (UA)   


 


Urine Ketones   


 


Urine Blood   


 


Urine Nitrate   


 


Urine Bilirubin   


 


Urine Urobilinogen   


 


Ur Leukocyte Esterase   


 


Urine RBC (Auto)   


 


Urine Microscopic WBC   


 


Ur Squamous Epith Cells   


 


Urine Opiates Screen   


 


Urine Methadone Screen   


 


Ur Barbiturates Screen   


 


Ur Phencyclidine Scrn   


 


Ur Amphetamines Screen   


 


U Benzodiazepines Scrn   


 


U Oth Cocaine Metabols   


 


U Cannabinoids Screen   


 


HIV-1 Ab Rapid Screen   














  11/26/18 11/26/18 11/26/18





  14:00 14:38 14:38


 


WBC   


 


RBC   


 


Hgb   


 


Hct   


 


MCV   


 


MCH   


 


MCHC   


 


RDW   


 


Plt Count   


 


MPV   


 


Neut % (Auto)   


 


Lymph % (Auto)   


 


Mono % (Auto)   


 


Eos % (Auto)   


 


Baso % (Auto)   


 


Neut # (Auto)   


 


Lymph # (Auto)   


 


Mono # (Auto)   


 


Eos # (Auto)   


 


Baso # (Auto)   


 


Neutrophils % (Manual)   


 


Lymphocytes % (Manual)   


 


Monocytes % (Manual)   


 


Platelet Estimate   


 


Tear Drop Cells   


 


Ovalocytes   


 


ESR  61 H  


 


PT   


 


INR   


 


APTT   


 


Sodium   


 


Potassium   


 


Chloride   


 


Carbon Dioxide   


 


Anion Gap   


 


BUN   


 


Creatinine   


 


Est GFR ( Amer)   


 


Est GFR (Non-Af Amer)   


 


Random Glucose   


 


Calcium   


 


Total Bilirubin   


 


AST   


 


ALT   


 


Alkaline Phosphatase   


 


Total Protein   


 


Albumin   


 


Globulin   


 


Albumin/Globulin Ratio   


 


Urine Color   Paloma 


 


Urine Clarity   Slighty-cloudy 


 


Urine pH   5.0 


 


Ur Specific Gravity   1.026 


 


Urine Protein   Negative 


 


Urine Glucose (UA)   Neg 


 


Urine Ketones   Trace 


 


Urine Blood   Negative 


 


Urine Nitrate   Negative 


 


Urine Bilirubin   Negative 


 


Urine Urobilinogen   4.0 


 


Ur Leukocyte Esterase   Neg 


 


Urine RBC (Auto)   1 


 


Urine Microscopic WBC   2 


 


Ur Squamous Epith Cells   1 


 


Urine Opiates Screen    Positive H


 


Urine Methadone Screen    Negative


 


Ur Barbiturates Screen    Negative


 


Ur Phencyclidine Scrn    Negative


 


Ur Amphetamines Screen    Negative


 


U Benzodiazepines Scrn    Positive


 


U Oth Cocaine Metabols    Negative


 


U Cannabinoids Screen    Positive H


 


HIV-1 Ab Rapid Screen   














  11/26/18





  15:53


 


WBC 


 


RBC 


 


Hgb 


 


Hct 


 


MCV 


 


MCH 


 


MCHC 


 


RDW 


 


Plt Count 


 


MPV 


 


Neut % (Auto) 


 


Lymph % (Auto) 


 


Mono % (Auto) 


 


Eos % (Auto) 


 


Baso % (Auto) 


 


Neut # (Auto) 


 


Lymph # (Auto) 


 


Mono # (Auto) 


 


Eos # (Auto) 


 


Baso # (Auto) 


 


Neutrophils % (Manual) 


 


Lymphocytes % (Manual) 


 


Monocytes % (Manual) 


 


Platelet Estimate 


 


Tear Drop Cells 


 


Ovalocytes 


 


ESR 


 


PT 


 


INR 


 


APTT 


 


Sodium 


 


Potassium 


 


Chloride 


 


Carbon Dioxide 


 


Anion Gap 


 


BUN 


 


Creatinine 


 


Est GFR ( Amer) 


 


Est GFR (Non-Af Amer) 


 


Random Glucose 


 


Calcium 


 


Total Bilirubin 


 


AST 


 


ALT 


 


Alkaline Phosphatase 


 


Total Protein 


 


Albumin 


 


Globulin 


 


Albumin/Globulin Ratio 


 


Urine Color 


 


Urine Clarity 


 


Urine pH 


 


Ur Specific Gravity 


 


Urine Protein 


 


Urine Glucose (UA) 


 


Urine Ketones 


 


Urine Blood 


 


Urine Nitrate 


 


Urine Bilirubin 


 


Urine Urobilinogen 


 


Ur Leukocyte Esterase 


 


Urine RBC (Auto) 


 


Urine Microscopic WBC 


 


Ur Squamous Epith Cells 


 


Urine Opiates Screen 


 


Urine Methadone Screen 


 


Ur Barbiturates Screen 


 


Ur Phencyclidine Scrn 


 


Ur Amphetamines Screen 


 


U Benzodiazepines Scrn 


 


U Oth Cocaine Metabols 


 


U Cannabinoids Screen 


 


HIV-1 Ab Rapid Screen  Non reactive











EKG/Cardiology Studies: 


Cardiology / EKG Studies





11/26/18 13:49


EKG [ELECTROCARDIOGRAM] Stat 


   Comment: 


   Mode Of Transportation: PORTABLE


   Reason For Exam: SOB














Review of Systems





- Cardiovascular


Cardiovascular: absent: As Per HPI, Acrocyanosis, Chest Pain, Chest Pain at 

Rest, Chest Pain with Activity, Claudication, Diaphoresis, Dyspnea, Dyspnea on 

Exertion, Edema, Irregular Heart Rhythm, Pain Radiating to Arm/Neck/Jaw, Leg 

Edema, Leg Ulcers, Lightheadedness, Orthopnea, Palpitations, Paroxysmal 

Nocturnal Dyspnea, Pedal Edema, Radiating Pain, Rapid Heart Rate, Slow Heart 

Rate, Syncope, Other, UNREMARKABLE





- Respiratory


Respiratory: absent: As Per HPI, Cough, Dyspnea, Hemoptysis, Dyspnea on 

Exertion, Wheezing, Snoring, Stridor, Pain on Inspiration, Chest Congestion, 

Excessive Mucous Production, Change in Mucous Color, Pain with Coughing, Other, 

UNREMARKABLE





- Neurological


Neurological: Abnormal Gait, Numbness, Focal Weakness (Bilateral LE), Frequent 

Falls, Lack of Coordination, Paresthesias, Sensory Deficit, Tingling, Weakness. 

 absent: Abnormal Speech, Behavioral Changes, Burning Sensations, Confusion, 

Convulsions, Disequilibrium, Dizziness, Headaches, Loss of Vision, Memory Loss, 

Restless Legs, Syncope, Tremor, Vertigo





Assessment/Plan


(1) Discitis of cervical region


Current Visit: Yes   Status: Acute   





(2) Epidural abscess


Current Visit: Yes   Status: Acute   





(3) Osteomyelitis of cervical spine


Current Visit: Yes   Status: Acute   





(4) Back pain


Current Visit: No   Status: Acute   





- Assessment and Plan (Free Text)


Assessment: 








On Exam, there is no symptoms or signs of raised intracranial pressure


ICU care for hemodynamic and for frequent neuro checks


ID/Neurology/Neurosurgery Consult appreciated


Continue steroids


Cefepime 2 gm IVPB Q8 SERENA


Vancomycin HCl 1 gm IVPB Q8


Discussed with the Patient in details his diagnosis, treatment plans and 

alternative and stressed on the fact that successful treatment of usually 

requires a combination of Neurosurgerical drainage procedure and antibiotic 

therapy, He agrees with surgical intervension


NPO after midnight


Neurosurgery notified, OR in AM

## 2018-11-26 NOTE — MRI
Date of service: 



11/26/2018



PROCEDURE:  MR LUMBAR SPINE WITHOUT CONTRAST



HISTORY:

BLE weakness



COMPARISON:

None available. 



TECHNIQUE:

Multiecho multiplanar sequences were performed through the lumbar 

spine without the use of intravenous contrast.



FINDINGS:

Normal lumbar lordosis.



Vertebral body heights are preserved.



Marrow signal unremarkable.



Conus medullaris unremarkable at the level of L2 vertebral body. 



Prevertebral and paraspinal soft tissues are unremarkable.



T12-L1:

No disc herniation, spinal canal stenosis or neural foraminal 

narrowing. 



L1-2:

No disc herniation, spinal canal stenosis or neural foraminal 

narrowing. 



L2-3:

No disc herniation, spinal canal stenosis or neural foraminal 

narrowing. 



L3-4:

No disc herniation, spinal canal stenosis or neural foraminal 

narrowing. 



L4-5:

No disc herniation, spinal canal stenosis or neural foraminal 

narrowing.  Minimal disc bulge and mild-to-moderate facet joint 

degenerative change. 



L5-S1:

There is a small right paracentral disc protrusion without indenting 

the ventral thecal sac or causing significant stenosis.  

Mild-to-moderate facet joint degenerative changes are seen 

symmetrically.  No definite neural foraminal stenosis bilaterally. 



OTHER FINDINGS:

None. 



IMPRESSION:

1. Small right paracentral disc protrusion L5-S1 without significant 

stenosis resulting. 



2. Minimal disc bulge L4-5 without stenosis. 



3. Widely patent central canal and bilateral neural foramina 

throughout.

## 2018-11-27 LAB
BUN SERPL-MCNC: 14 MG/DL (ref 9–20)
CALCIUM SERPL-MCNC: 9.2 MG/DL (ref 8.4–10.2)
ERYTHROCYTE [DISTWIDTH] IN BLOOD BY AUTOMATED COUNT: 12.9 % (ref 11.5–14.5)
GFR NON-AFRICAN AMERICAN: > 60
HGB BLD-MCNC: 12.8 G/DL (ref 12–18)
MCH RBC QN AUTO: 31.2 PG (ref 27–31)
MCHC RBC AUTO-ENTMCNC: 33.5 G/DL (ref 33–37)
MCV RBC AUTO: 93.2 FL (ref 80–94)
PLATELET # BLD: 412 K/UL (ref 130–400)
RBC # BLD AUTO: 4.1 MIL/UL (ref 4.4–5.9)
WBC # BLD AUTO: 10.2 K/UL (ref 4.8–10.8)

## 2018-11-27 PROCEDURE — 0RB10ZZ EXCISION OF CERVICAL VERTEBRAL JOINT, OPEN APPROACH: ICD-10-PCS

## 2018-11-27 PROCEDURE — 00BW0ZZ EXCISION OF CERVICAL SPINAL CORD, OPEN APPROACH: ICD-10-PCS

## 2018-11-27 RX ADMIN — DEXAMETHASONE SODIUM PHOSPHATE SCH MG: 4 INJECTION, SOLUTION INTRAMUSCULAR; INTRAVENOUS at 05:52

## 2018-11-27 RX ADMIN — DEXAMETHASONE SODIUM PHOSPHATE SCH MG: 4 INJECTION, SOLUTION INTRAMUSCULAR; INTRAVENOUS at 15:59

## 2018-11-27 RX ADMIN — DEXAMETHASONE SODIUM PHOSPHATE SCH MG: 4 INJECTION, SOLUTION INTRAMUSCULAR; INTRAVENOUS at 09:10

## 2018-11-27 RX ADMIN — DEXAMETHASONE SODIUM PHOSPHATE SCH MG: 4 INJECTION, SOLUTION INTRAMUSCULAR; INTRAVENOUS at 21:39

## 2018-11-27 RX ADMIN — POTASSIUM CHLORIDE, DEXTROSE MONOHYDRATE AND SODIUM CHLORIDE SCH MLS/HR: 150; 5; 450 INJECTION, SOLUTION INTRAVENOUS at 10:49

## 2018-11-27 NOTE — CP.CCUPN
CCU Subjective





- Physician Review


Events Since Last Encounter (Free Text): 





11/27/18 15:38


The patient was Seen and examined by me at the bedside


Medical records reviewed and Management issues were discussed and formulated 

with the house staff.


Events reviewed


 


Mr Ken is a 33 Years old active smoker Male with no significant Past medical 

history 


Who was brought to the Emergency department by EMS with complaint of Bilateral 

lower extremities Weakness, numbness, weakness in both hands 


and unable to ambulate for the past 4 days.


Patient also reports urinary incontinence and urge to defecate but not being 

able to, last bowel movement was a week ago. 


Also admits fall at home


Patient was seen in the ER week ago for neck pain, he was given a shot, NSAIDs 

and muscle relaxant. 


Pt placed on  a cervical collar and getting and underwent MRI of the spine.


In the ER, he underwent MRI revealed C6-7 intervertebral disc fluid with edema 

at C6 and C7 vertebral bodies related to the mid to posterior endplate 

distribution suspicious for discitis osteomyelitis. 


Epidural fluid is identified more posteriorly than anteriorly from at least C4-

T1 levels with cord compressed at C6 and C7.  


Patient was evaluated by neurology, ID and neurosurgery


Pt was started on IV Vanco and Cefapime 





Patient underwent Neurosurgerical drainage procedure and just returned back to 

ICU


Procedure done under General Anesthesia and was uneventful


Minimal EBL


Awake, denies any chest pain or SOB


Comfortable, NAD


Pt AAO x3. 


Afebrile, NSR on the monitor


Has PCA for pain


On Exam, there is no symptoms or signs of raised intracranial pressure








CCU Objective





- Vital Signs / Intake & Output


Vital Signs (Last 4 hours): 


Vital Signs











  Temp Pulse Resp BP Pulse Ox


 


 11/27/18 14:55  97.6 F  62  19  125/67  100


 


 11/27/18 14:40  97.3 F L  65  18  125/73  100


 


 11/27/18 14:25  97 F L  70  18  126/77  100


 


 11/27/18 14:10  96.8 F L  88  16  134/78  100











Intake and Output (Last 8hrs): 


                                 Intake & Output











 11/27/18 11/27/18 11/27/18





 06:59 14:59 22:59


 


Intake Total 770 1310 


 


Balance 770 1310 


 


Weight 165 lb  


 


Intake:   


 


   1210 


 


  Intake, Piggyback 350 100 














- Physical Exam


Head: Positive for: Atraumatic, Normocephalic.  Negative for: Tenderness, 

Contusion, Swelling


Pupils: Positive for: PERRL.  Negative for: Sluggish, Non-Reactive, Pinpoint


Extroacular Muscles: Positive for: EOMI


Conjunctiva: Positive for: Normal.  Negative for: Injected, Icteric


Neck: Positive for: Meningeal Signs, MIDLINE TENDERNESS, Paraspinal Tenderness, 

Trachea Midline.  Negative for: Bruit


Respiratory/Chest: Positive for: Clear to Auscultation, Good Air Exchange.  

Negative for: Respiratory Distress, Accessory Muscle Use, Wheezes, Decreased 

Breath Sounds, Rales, Retracting


Cardiovascular: Positive for: Regular Rate and Rhythm, Normal S1, S2, Peripheal 

Pulses Present.  Negative for: Murmurs


Abdomen: Positive for: Normal Bowel Sounds.  Negative for: Tenderness, 

Distention, Peritoneal Signs


Back: Negative for: CVA Tenderness, Midline Tenderness


Upper Extremity: Positive for: Normal Inspection, NORMAL PULSES, Other 

(Decreased  strength, B/L hands Weakness ).  Negative for: Cyanosis, Edema, 

Tenderness, Swelling


Lower Extremity: Positive for: Normal Inspection, NORMAL PULSES, Other 

(Decreased sensations and power to bilateral lower extremities).  Negative for: 

Edema, CALF TENDERNESS, Cyanosis


Neurological: Positive for: GCS=15, CN II-XII Intact, Speech Normal, Memory 

Normal, Other (Decreased  strength, B/L hands Weakness ).  Negative for: 

Motor Func Grossly Intact (Muscle weakness to bilateral lower extremities), 

Normal Sensory Function (Decreased sensations and power to bilateral lower 

extremities), Norm Deep Tendon Reflexes, Gait Normal


Psychiatric: Positive for: Alert, Oriented x 3, Normal Insight, Normal 

Concentration, Normal Affect, Normal Mood.  Negative for: Anxious, Agitated





- Medications


Active Medications: 


Active Medications











Generic Name Dose Route Start Last Admin





  Trade Name Freq  PRN Reason Stop Dose Admin


 


Dexamethasone  4 mg  11/26/18 22:00  11/27/18 09:10





  Decadron Inj  IVP   4 mg





  Q6 SERENA   Administration





     





     





     





     


 


Hydromorphone HCl  0.5 mg  11/27/18 14:17  





  Dilaudid  IVP  11/27/18 16:18  





  Q5M PRN   





  Pain, moderate (4-7)   





     





     





     


 


Hydromorphone HCl  0 mg  11/27/18 14:19  11/27/18 14:46





  Dilaudid 0.2 Mg/Ml Pca  IV   0 mg





  PRN PRN   Administration





  Pain, moderate (4-7)   





     





  Protocol   





     


 


Potassium Chloride/Dextrose/Sod Cl  1,000 mls @ 70 mls/hr  11/26/18 19:15  

11/27/18 10:49





  Potassium Chl 20 Meq In D5-1/2ns  IV  11/27/18 19:06  70 mls/hr





  .[id number] SERENA   Administration





     





     





     





     


 


Cefepime HCl 2 gm/ Sodium  100 mls @ 100 mls/hr  11/27/18 01:00  11/27/18 08:15





  Chloride  IVPB   100 mls/hr





  Q8 SERENA   Administration





     





     





  Protocol   





     


 


Vancomycin HCl 1 gm/ Sodium  250 mls @ 125 mls/hr  11/27/18 01:00  11/27/18 

00:42





  Chloride  IVPB   125 mls/hr





  Q12@0100,1300 SERENA   Administration





     





     





  Protocol   





     


 


Lactated Ringer's  1,000 mls @ 100 mls/hr  11/27/18 14:30  11/27/18 14:10





  Lactated Ringer's  IV   0 mls





  .Q10H SERENA   Administration





     





     





     





     


 


Naloxone HCl  0.1 mg  11/27/18 14:19  





  Narcan  IVP   





  Q2M PRN   





  Shortness of Breath   





     





     





     


 


Ondansetron HCl  4 mg  11/27/18 14:17  





  Zofran Inj  IVP  11/27/18 16:18  





  ONCE PRN   





  Nausea/Vomiting   





     





     





     














- Patient Studies


Lab Studies: 


                              Microbiology Studies











 11/26/18 14:00 Blood Culture - Preliminary





 Blood    NO GROWTH AFTER 24 HOURS


 


 11/26/18 14:00 Blood Culture - Preliminary





 Blood    NO GROWTH AFTER 24 HOURS








                                   Lab Studies











  11/27/18 11/27/18 11/26/18 Range/Units





  04:30 04:30 15:53 


 


WBC   10.2   (4.8-10.8)  K/uL


 


RBC   4.10 L   (4.40-5.90)  Mil/uL


 


Hgb   12.8   (12.0-18.0)  g/dL


 


Hct   38.2   (35.0-51.0)  %


 


MCV   93.2   (80.0-94.0)  fl


 


MCH   31.2 H   (27.0-31.0)  pg


 


MCHC   33.5   (33.0-37.0)  g/dL


 


RDW   12.9   (11.5-14.5)  %


 


Plt Count   412 H   (130-400)  K/uL


 


Sodium  136    (132-148)  mmol/l


 


Potassium  4.6    (3.6-5.0)  MMOL/L


 


Chloride  101    ()  mmol/L


 


Carbon Dioxide  27    (22-30)  mmol/L


 


Anion Gap  13    (10-20)  


 


BUN  14    (9-20)  mg/dl


 


Creatinine  0.8    (0.8-1.5)  mg/dl


 


Est GFR (African Amer)  > 60    


 


Est GFR (Non-Af Amer)  > 60    


 


Random Glucose  142 H    ()  mg/dL


 


Calcium  9.2    (8.4-10.2)  mg/dL


 


C-Reactive Protein     (0.0-9.9)  mg/L


 


HIV-1 Ab Rapid Screen    Non reactive  (NON REAC)  














  11/26/18 Range/Units





  14:03 


 


WBC   (4.8-10.8)  K/uL


 


RBC   (4.40-5.90)  Mil/uL


 


Hgb   (12.0-18.0)  g/dL


 


Hct   (35.0-51.0)  %


 


MCV   (80.0-94.0)  fl


 


MCH   (27.0-31.0)  pg


 


MCHC   (33.0-37.0)  g/dL


 


RDW   (11.5-14.5)  %


 


Plt Count   (130-400)  K/uL


 


Sodium   (132-148)  mmol/l


 


Potassium   (3.6-5.0)  MMOL/L


 


Chloride   ()  mmol/L


 


Carbon Dioxide   (22-30)  mmol/L


 


Anion Gap   (10-20)  


 


BUN   (9-20)  mg/dl


 


Creatinine   (0.8-1.5)  mg/dl


 


Est GFR (African Amer)   


 


Est GFR (Non-Af Amer)   


 


Random Glucose   ()  mg/dL


 


Calcium   (8.4-10.2)  mg/dL


 


C-Reactive Protein  58.40 H  (0.0-9.9)  mg/L


 


HIV-1 Ab Rapid Screen   (NON REAC)  








                         Laboratory Results - last 24 hr











  11/26/18 11/26/18 11/27/18





  14:03 15:53 04:30


 


WBC    10.2


 


RBC    4.10 L


 


Hgb    12.8


 


Hct    38.2


 


MCV    93.2


 


MCH    31.2 H


 


MCHC    33.5


 


RDW    12.9


 


Plt Count    412 H


 


Sodium   


 


Potassium   


 


Chloride   


 


Carbon Dioxide   


 


Anion Gap   


 


BUN   


 


Creatinine   


 


Est GFR ( Amer)   


 


Est GFR (Non-Af Amer)   


 


Random Glucose   


 


Calcium   


 


C-Reactive Protein  58.40 H  


 


HIV-1 Ab Rapid Screen   Non reactive 














  11/27/18





  04:30


 


WBC 


 


RBC 


 


Hgb 


 


Hct 


 


MCV 


 


MCH 


 


MCHC 


 


RDW 


 


Plt Count 


 


Sodium  136


 


Potassium  4.6


 


Chloride  101


 


Carbon Dioxide  27


 


Anion Gap  13


 


BUN  14


 


Creatinine  0.8


 


Est GFR ( Amer)  > 60


 


Est GFR (Non-Af Amer)  > 60


 


Random Glucose  142 H


 


Calcium  9.2


 


C-Reactive Protein 


 


HIV-1 Ab Rapid Screen 














Critical Care Progress Note





- Nutrition


Nutrition: 


                                    Nutrition











 Category Date Time Status


 


 NPO Diet [DIET] Diets  11/27/18 Breakfast Active














Assessment/Plan


(1) Discitis of cervical region


Current Visit: Yes   Status: Acute   





(2) Epidural abscess


Current Visit: Yes   Status: Acute   





(3) Osteomyelitis of cervical spine


Current Visit: Yes   Status: Acute   





(4) Back pain


Current Visit: No   Status: Acute   





- Assessment and Plan (Free Text)


Assessment: 








Neurosurgerical drainage procedure for dicitis/ epidural abscess  C4-C7








ICU care for hemodynamic and for frequent neuro checks


IV Hydration 


NPO until fully awake and can pass the nurse bedside swallow evaluation. 


Pain control with MORPHIN PCA


Continue steroids


Cefepime 2 gm IVPB Q8 SERENA


Vancomycin HCl 1 gm IVPB Q8


ID/Neurology/Neurosurgery Consult appreciated


Fall/aspiration /Seizures precautions 


GI/DVT PPX with SCDs


PT/OT


Discussed with the Patient in details his diagnosis, treatment plans and 

alternative.

## 2018-11-27 NOTE — CP.PCM.CON
History of Present Illness





- History of Present Illness


History of Present Illness: 





34 yo male admitted with 1-2 wk hx of back pain  recently progressing to leg 

weakness and incontinence of bowel/bladder 


Admitted to ICU with dicitis/ epidural abscess  C4-C7


Going to OR today for abscess drainage / decompression 


empiric IV antibiotics on board





PMH- back pain


SH  + smoker  IVDA 


FH   N/C


NKDA 





Review of Systems





- Review of Systems


All systems: reviewed and no additional remarkable complaints except





- Constitutional


Constitutional: As Per HPI.  absent: Chills, Fever





- EENT


Eyes: absent: As Per HPI, Blind Spots, Blurred Vision, Change in Vision, 

Decreased Night Vision, Diplopia, Discharge, Dry Eye, Exophthalmos, Floaters, 

Irritation, Itchy Eyes, Loss of Peripheral Vision, Pain, Photophobia, Requires 

Corrective Lenses, Sees Flashes, Spots in Vision, Tunnel Vision, Other Visual 

Disturbances, Loss of Vision, Other


Ears: absent: As Per HPI, Decreased Hearing, Ear Discharge, Ear Pain, Tinnitus, 

Abnormal Hearing, Disequilibrium, Dizziness, Other


Nose/Mouth/Throat: absent: As Per HPI, Epistaxis, Nasal Congestion, Nasal 

Discharge, Nasal Obstruction, Nasal Trauma, Nose Pain, Post Nasal Drip, Sinus 

Pain, Sinus Pressure, Bleeding Gums, Change in Voice, Dental Pain, Dry Mouth, 

Dysphagia, Halitosis, Hoarsness, Lip Swelling, Mouth Lesions, Mouth Pain, 

Odynophagia, Sore Throat, Throat Swelling, Tongue Swelling, Facial Pain, Neck 

Pain, Neck Mass, Other





- Cardiovascular


Cardiovascular: absent: As Per HPI, Acrocyanosis, Chest Pain, Chest Pain at 

Rest, Chest Pain with Activity, Claudication, Diaphoresis, Dyspnea, Dyspnea on 

Exertion, Edema, Irregular Heart Rhythm, Pain Radiating to Arm/Neck/Jaw, Leg 

Edema, Leg Ulcers, Lightheadedness, Orthopnea, Palpitations, Paroxysmal Noctu

rnal Dyspnea, Pedal Edema, Radiating Pain, Rapid Heart Rate, Slow Heart Rate, 

Syncope, Other





- Respiratory


Respiratory: absent: As Per HPI, Cough, Dyspnea, Hemoptysis, Dyspnea on 

Exertion, Wheezing, Snoring, Stridor, Pain on Inspiration, Chest Congestion, 

Excessive Mucous Production, Change in Mucous Color, Pain with Coughing, Other





- Gastrointestinal


Gastrointestinal: absent: As Per HPI, Abdominal Pain, Belching, Bloating, Change

in Bowel Habits, Change in Stool Character, Coffee Ground Emesis, Constipation, 

Cramping, Diarrhea, Dyspepsia, Dysphagia, Early Satiety, Excessive Flatus, Fecal

Incontinence, Heartburn, Hematemesis, Hematochezia, Loose Stools, Melena, 

Nausea, Odynophagia, Temesmus, Vomiting, Other





- Genitourinary


Genitourinary: absent: As Per HPI, Change in Urinary Stream, Difficulty 

Urinating, Dysuria, Flank Pain, Hematuria, Pyuria, Nocturia, Urinary Incontine

nce, Urinary Frequency, Urinary Hesitance, Urinary Urgency, Voiding Freq/Small 

Amts, Freq UTI, Hx Renal/Bladder Calculi, Hx /Renal Surgery, Bladder 

Distension, Other





- Musculoskeletal


Musculoskeletal: As Per HPI





- Integumentary


Integumentary: As Per HPI





- Neurological


Neurological: As Per HPI





- Psychiatric


Psychiatric: absent: As Per HPI, Abnormal Sleep Pattern, Anhedonia, Anxiety, 

Auditory Hallucinations, Behavioral Changes, Change in Appetite, Change in 

Libido, Confusion, Depression, Difficulty Concentrating, Hallucinations, 

Homicidal Ideation, Hopelessness, Irritability, Memory Loss, Mood Swings, Panic 

Attacks, Paranoia, Suicidal Ideation, Visual Hallucinations, Tactile 

Hallucinations, Other





- Endocrine


Endocrine: absent: As Per HPI, Change in Body Appearance, Change in Libido, Cold

Intolorance, Deepening of Voice, Excessive Sweating, Fatigue, Flushing, Heat 

Intolorance, Increase in Ring/Shoe/Hat Size, Palpitations, Polydipsia, 

Polyphagia, Polyuria, Other





- Hematologic/Lymphatic


Hematologic: absent: As Per HPI, Easy Bleeding, Easy Bruising, Lymphadenopathy, 

Other





Past Patient History





- Past Medical History & Family History


Past Medical History?: Yes





- Past Social History


Smoking Status: Heavy Smoker > 10 Cigarettes Daily





- CARDIAC


Hx Cardiac Disorders: No





- PULMONARY


Hx Respiratory Disorders: No





- MUSCULOSKELETAL/RHEUMATOLOGICAL


Hx Falls: Yes





- PSYCHIATRIC


Hx Substance Use: No





- SURGICAL HISTORY


Hx Surgeries: Yes


Other/Comment: hernia surgery





- ANESTHESIA


Hx Anesthesia: Yes


Hx Anesthesia Reactions: No


Hx Malignant Hyperthermia: No


Has any member of the family had a problem w/ anesthesia?: No





Meds


Allergies/Adverse Reactions: 


                                    Allergies











Allergy/AdvReac Type Severity Reaction Status Date / Time


 


No Known Allergies Allergy   Verified 11/20/18 16:54














- Medications


Medications: 


                               Current Medications





Dexamethasone (Decadron Inj)  4 mg IVP Q6 SERENA


   Last Admin: 11/27/18 09:10 Dose:  4 mg


Potassium Chloride/Dextrose/Sod Cl (Potassium Chl 20 Meq In D5-1/2ns)  1,000 mls

@ 70 mls/hr IV .[id number] SERENA


   Stop: 11/27/18 19:06


   Last Admin: 11/26/18 21:04 Dose:  70 mls/hr


Cefepime HCl 2 gm/ Sodium (Chloride)  100 mls @ 100 mls/hr IVPB Q8 SERENA; Protocol


   Last Admin: 11/27/18 08:15 Dose:  100 mls/hr


Vancomycin HCl 1 gm/ Sodium (Chloride)  250 mls @ 125 mls/hr IVPB Q12@0100,1300 

SERENA; Protocol


   Last Admin: 11/27/18 00:42 Dose:  125 mls/hr











Physical Exam





- Constitutional


Appears: Non-toxic, No Acute Distress





- Head Exam


Head Exam: ATRAUMATIC, NORMAL INSPECTION, NORMOCEPHALIC





- Eye Exam


Eye Exam: EOMI, PERRL.  absent: Scleral icterus





- ENT Exam


ENT Exam: Mucous Membranes Dry





- Neck Exam


Neck exam: Negative for: Lymphadenopathy





- Respiratory Exam


Respiratory Exam: Decreased Breath Sounds, Clear to Auscultation Bilateral





- Cardiovascular Exam


Cardiovascular Exam: REGULAR RHYTHM, +S1, +S2





- GI/Abdominal Exam


GI & Abdominal Exam: Diminished Bowel Sounds, Soft.  absent: Tenderness





- Rectal Exam


Rectal Exam: Deferred





-  Exam


 Exam: NORMAL INSPECTION





- Extremities Exam


Extremities exam: Positive for: pedal pulses present.  Negative for: calf 

tenderness, pedal edema, tenderness





- Back Exam


Back exam: absent: CVA tenderness (L), CVA tenderness (R), paraspinal tenderness





- Neurological Exam


Neurological exam: Alert, CN II-XII Intact, Oriented x3


Additional comments: 





weakness both lower extremities 1-2/5  with diminished sensation


moves upper extrem  4/5





- Psychiatric Exam


Psychiatric exam: Depressed





- Skin


Skin Exam: Dry





Results





- Vital Signs


Recent Vital Signs: 


                                Last Vital Signs











Temp  98.2 F   11/27/18 07:52


 


Pulse  51 L  11/27/18 09:58


 


Resp  22   11/27/18 09:58


 


BP  110/38 L  11/27/18 09:58


 


Pulse Ox  96   11/27/18 09:58














- Labs


Result Diagrams: 


                                 11/27/18 04:30





                                 11/27/18 04:30


Labs: 


                         Laboratory Results - last 24 hr











  11/26/18 11/26/18 11/26/18





  10:28 10:28 10:28


 


WBC  12.2 H  


 


RBC  4.06 L  


 


Hgb  12.4  


 


Hct  37.9  


 


MCV  93.4  


 


MCH  30.5  


 


MCHC  32.6 L  


 


RDW  13.2  


 


Plt Count  370  


 


MPV  7.3  


 


Neut % (Auto)  80.5 H  


 


Lymph % (Auto)  9.4 L  


 


Mono % (Auto)  8.0  


 


Eos % (Auto)  1.8  


 


Baso % (Auto)  0.3  


 


Neut # (Auto)  9.8 H  


 


Lymph # (Auto)  1.1  


 


Mono # (Auto)  1.0 H  


 


Eos # (Auto)  0.2  


 


Baso # (Auto)  0.0  


 


Neutrophils % (Manual)  76 H  


 


Lymphocytes % (Manual)  13 L  


 


Monocytes % (Manual)  11 H  


 


Platelet Estimate  Normal  


 


Tear Drop Cells  Slight  


 


Ovalocytes  Slight  


 


ESR   


 


PT    14.1 H


 


INR    1.2


 


APTT    26.1


 


Sodium   137 


 


Potassium   4.0 


 


Chloride   101 


 


Carbon Dioxide   26 


 


Anion Gap   14 


 


BUN   11 


 


Creatinine   0.8 


 


Est GFR ( Amer)   > 60 


 


Est GFR (Non-Af Amer)   > 60 


 


Random Glucose   106 


 


Calcium   9.1 


 


Total Bilirubin   0.5 


 


AST   46 


 


ALT   26 


 


Alkaline Phosphatase   56 


 


C-Reactive Protein   


 


Total Protein   7.5 


 


Albumin   3.6 


 


Globulin   3.9 


 


Albumin/Globulin Ratio   0.9 L 


 


Urine Color   


 


Urine Clarity   


 


Urine pH   


 


Ur Specific Gravity   


 


Urine Protein   


 


Urine Glucose (UA)   


 


Urine Ketones   


 


Urine Blood   


 


Urine Nitrate   


 


Urine Bilirubin   


 


Urine Urobilinogen   


 


Ur Leukocyte Esterase   


 


Urine RBC (Auto)   


 


Urine Microscopic WBC   


 


Ur Squamous Epith Cells   


 


Urine Opiates Screen   


 


Urine Methadone Screen   


 


Ur Barbiturates Screen   


 


Ur Phencyclidine Scrn   


 


Ur Amphetamines Screen   


 


U Benzodiazepines Scrn   


 


U Oth Cocaine Metabols   


 


U Cannabinoids Screen   


 


HIV-1 Ab Rapid Screen   














  11/26/18 11/26/18 11/26/18





  14:00 14:03 14:38


 


WBC   


 


RBC   


 


Hgb   


 


Hct   


 


MCV   


 


MCH   


 


MCHC   


 


RDW   


 


Plt Count   


 


MPV   


 


Neut % (Auto)   


 


Lymph % (Auto)   


 


Mono % (Auto)   


 


Eos % (Auto)   


 


Baso % (Auto)   


 


Neut # (Auto)   


 


Lymph # (Auto)   


 


Mono # (Auto)   


 


Eos # (Auto)   


 


Baso # (Auto)   


 


Neutrophils % (Manual)   


 


Lymphocytes % (Manual)   


 


Monocytes % (Manual)   


 


Platelet Estimate   


 


Tear Drop Cells   


 


Ovalocytes   


 


ESR  61 H  


 


PT   


 


INR   


 


APTT   


 


Sodium   


 


Potassium   


 


Chloride   


 


Carbon Dioxide   


 


Anion Gap   


 


BUN   


 


Creatinine   


 


Est GFR ( Amer)   


 


Est GFR (Non-Af Amer)   


 


Random Glucose   


 


Calcium   


 


Total Bilirubin   


 


AST   


 


ALT   


 


Alkaline Phosphatase   


 


C-Reactive Protein   58.40 H 


 


Total Protein   


 


Albumin   


 


Globulin   


 


Albumin/Globulin Ratio   


 


Urine Color    Paloma


 


Urine Clarity    Slighty-cloudy


 


Urine pH    5.0


 


Ur Specific Gravity    1.026


 


Urine Protein    Negative


 


Urine Glucose (UA)    Neg


 


Urine Ketones    Trace


 


Urine Blood    Negative


 


Urine Nitrate    Negative


 


Urine Bilirubin    Negative


 


Urine Urobilinogen    4.0


 


Ur Leukocyte Esterase    Neg


 


Urine RBC (Auto)    1


 


Urine Microscopic WBC    2


 


Ur Squamous Epith Cells    1


 


Urine Opiates Screen   


 


Urine Methadone Screen   


 


Ur Barbiturates Screen   


 


Ur Phencyclidine Scrn   


 


Ur Amphetamines Screen   


 


U Benzodiazepines Scrn   


 


U Oth Cocaine Metabols   


 


U Cannabinoids Screen   


 


HIV-1 Ab Rapid Screen   














  11/26/18 11/26/18 11/27/18





  14:38 15:53 04:30


 


WBC    10.2


 


RBC    4.10 L


 


Hgb    12.8


 


Hct    38.2


 


MCV    93.2


 


MCH    31.2 H


 


MCHC    33.5


 


RDW    12.9


 


Plt Count    412 H


 


MPV   


 


Neut % (Auto)   


 


Lymph % (Auto)   


 


Mono % (Auto)   


 


Eos % (Auto)   


 


Baso % (Auto)   


 


Neut # (Auto)   


 


Lymph # (Auto)   


 


Mono # (Auto)   


 


Eos # (Auto)   


 


Baso # (Auto)   


 


Neutrophils % (Manual)   


 


Lymphocytes % (Manual)   


 


Monocytes % (Manual)   


 


Platelet Estimate   


 


Tear Drop Cells   


 


Ovalocytes   


 


ESR   


 


PT   


 


INR   


 


APTT   


 


Sodium   


 


Potassium   


 


Chloride   


 


Carbon Dioxide   


 


Anion Gap   


 


BUN   


 


Creatinine   


 


Est GFR ( Amer)   


 


Est GFR (Non-Af Amer)   


 


Random Glucose   


 


Calcium   


 


Total Bilirubin   


 


AST   


 


ALT   


 


Alkaline Phosphatase   


 


C-Reactive Protein   


 


Total Protein   


 


Albumin   


 


Globulin   


 


Albumin/Globulin Ratio   


 


Urine Color   


 


Urine Clarity   


 


Urine pH   


 


Ur Specific Gravity   


 


Urine Protein   


 


Urine Glucose (UA)   


 


Urine Ketones   


 


Urine Blood   


 


Urine Nitrate   


 


Urine Bilirubin   


 


Urine Urobilinogen   


 


Ur Leukocyte Esterase   


 


Urine RBC (Auto)   


 


Urine Microscopic WBC   


 


Ur Squamous Epith Cells   


 


Urine Opiates Screen  Positive H  


 


Urine Methadone Screen  Negative  


 


Ur Barbiturates Screen  Negative  


 


Ur Phencyclidine Scrn  Negative  


 


Ur Amphetamines Screen  Negative  


 


U Benzodiazepines Scrn  Positive  


 


U Oth Cocaine Metabols  Negative  


 


U Cannabinoids Screen  Positive H  


 


HIV-1 Ab Rapid Screen   Non reactive 














  11/27/18





  04:30


 


WBC 


 


RBC 


 


Hgb 


 


Hct 


 


MCV 


 


MCH 


 


MCHC 


 


RDW 


 


Plt Count 


 


MPV 


 


Neut % (Auto) 


 


Lymph % (Auto) 


 


Mono % (Auto) 


 


Eos % (Auto) 


 


Baso % (Auto) 


 


Neut # (Auto) 


 


Lymph # (Auto) 


 


Mono # (Auto) 


 


Eos # (Auto) 


 


Baso # (Auto) 


 


Neutrophils % (Manual) 


 


Lymphocytes % (Manual) 


 


Monocytes % (Manual) 


 


Platelet Estimate 


 


Tear Drop Cells 


 


Ovalocytes 


 


ESR 


 


PT 


 


INR 


 


APTT 


 


Sodium  136


 


Potassium  4.6


 


Chloride  101


 


Carbon Dioxide  27


 


Anion Gap  13


 


BUN  14


 


Creatinine  0.8


 


Est GFR ( Amer)  > 60


 


Est GFR (Non-Af Amer)  > 60


 


Random Glucose  142 H


 


Calcium  9.2


 


Total Bilirubin 


 


AST 


 


ALT 


 


Alkaline Phosphatase 


 


C-Reactive Protein 


 


Total Protein 


 


Albumin 


 


Globulin 


 


Albumin/Globulin Ratio 


 


Urine Color 


 


Urine Clarity 


 


Urine pH 


 


Ur Specific Gravity 


 


Urine Protein 


 


Urine Glucose (UA) 


 


Urine Ketones 


 


Urine Blood 


 


Urine Nitrate 


 


Urine Bilirubin 


 


Urine Urobilinogen 


 


Ur Leukocyte Esterase 


 


Urine RBC (Auto) 


 


Urine Microscopic WBC 


 


Ur Squamous Epith Cells 


 


Urine Opiates Screen 


 


Urine Methadone Screen 


 


Ur Barbiturates Screen 


 


Ur Phencyclidine Scrn 


 


Ur Amphetamines Screen 


 


U Benzodiazepines Scrn 


 


U Oth Cocaine Metabols 


 


U Cannabinoids Screen 


 


HIV-1 Ab Rapid Screen 














Assessment & Plan


(1) Discitis of cervical region


Status: Acute   





(2) Epidural abscess


Status: Acute   





(3) Osteomyelitis of cervical spine


Status: Acute   





- Assessment and Plan (Free Text)


Assessment: 





34 yo male admitted with 1-2 wk hx of back pain  recently progressing to leg 

weakness and incontinence of bowel/bladder 


Admitted to ICU with dicitis/ epidural abscess  C4-C7


Going to OR today for abscess drainage / decompression 





OR cultures to be sent


consider echo / [facility] when stable neurologically


cont IV antibiotics  / neuro eval PT /OT 


prognosis guarded from outset

## 2018-11-27 NOTE — CP.PCM.HP
History of Present Illness





- History of Present Illness


History of Present Illness: 





33 YR OLD MALE ADMITTED TO ICU BECAUSE OF WEAKNESS OF EXTREMITIES,UNSTEADY 

GAIT,URINARY AND FECAL INCONTENENENCE X 4VDAYS


HE WAS SEEN IN ER RECENTLY TREATED AND D/STU BUT SYMPTOMS WORSENED


HE WAS DIAGNOSED WITH EPIDURALM ABSCESS AND DISCITIS IN THE ER AND IS SCHEULED 

FOR NEOROSURGICAL INTERVENTION





Present on Admission





- Present on Admission


Any Indicators Present on Admission: No





Past Patient History





- Past Medical History & Family History


Past Medical History?: Yes





- Past Social History


Smoking Status: Heavy Smoker > 10 Cigarettes Daily





- CARDIAC


Hx Cardiac Disorders: No





- PULMONARY


Hx Respiratory Disorders: No





- MUSCULOSKELETAL/RHEUMATOLOGICAL


Hx Falls: Yes





- PSYCHIATRIC


Hx Substance Use: No





- SURGICAL HISTORY


Hx Surgeries: Yes


Other/Comment: hernia surgery





- ANESTHESIA


Hx Anesthesia: Yes


Hx Anesthesia Reactions: No


Hx Malignant Hyperthermia: No


Has any member of the family had a problem w/ anesthesia?: No





Meds


Allergies/Adverse Reactions: 


                                    Allergies











Allergy/AdvReac Type Severity Reaction Status Date / Time


 


No Known Allergies Allergy   Verified 11/20/18 16:54














Physical Exam





- Constitutional


Appears: Well, No Acute Distress





- Head Exam


Head Exam: ATRAUMATIC, NORMAL INSPECTION, NORMOCEPHALIC





- Eye Exam


Eye Exam: EOMI, Normal appearance, PERRL


Pupil Exam: NORMAL ACCOMODATION, PERRL





- ENT Exam


ENT Exam: Mucous Membranes Moist, Normal Exam





- Neck Exam


Neck exam: Positive for: Normal Inspection





- Respiratory Exam


Respiratory Exam: Clear to Auscultation Bilateral, NORMAL BREATHING PATTERN





- Cardiovascular Exam


Cardiovascular Exam: REGULAR RHYTHM





- GI/Abdominal Exam


GI & Abdominal Exam: Normal Bowel Sounds, Soft.  absent: Tenderness





- Rectal Exam


Rectal Exam: NORMAL INSPECTION





- Extremities Exam


Extremities exam: Positive for: normal inspection


Additional comments: 





WEAKNESS OF EXTREMITIES[LE WORSE THAN UPPER]





- Back Exam


Back exam: NORMAL INSPECTION





- Neurological Exam


Neurological exam: Abnormal Gait, Alert, CN II-XII Intact, Motor Sensory 

Deficit, Oriented x3, Reflexes Normal





- Psychiatric Exam


Psychiatric exam: Normal Affect, Normal Mood





- Skin


Skin Exam: Dry, Intact, Normal Color, Warm





Results





- Vital Signs


Recent Vital Signs: 





                                Last Vital Signs











Temp  98.2 F   11/27/18 07:52


 


Pulse  55 L  11/27/18 08:57


 


Resp  21   11/27/18 08:57


 


BP  113/57 L  11/27/18 08:57


 


Pulse Ox  94 L  11/27/18 08:57














- Labs


Result Diagrams: 


                                 11/27/18 04:30





                                 11/27/18 04:30


Labs: 





                         Laboratory Results - last 24 hr











  11/26/18 11/26/18 11/26/18





  10:28 10:28 10:28


 


WBC  12.2 H  


 


RBC  4.06 L  


 


Hgb  12.4  


 


Hct  37.9  


 


MCV  93.4  


 


MCH  30.5  


 


MCHC  32.6 L  


 


RDW  13.2  


 


Plt Count  370  


 


MPV  7.3  


 


Neut % (Auto)  80.5 H  


 


Lymph % (Auto)  9.4 L  


 


Mono % (Auto)  8.0  


 


Eos % (Auto)  1.8  


 


Baso % (Auto)  0.3  


 


Neut # (Auto)  9.8 H  


 


Lymph # (Auto)  1.1  


 


Mono # (Auto)  1.0 H  


 


Eos # (Auto)  0.2  


 


Baso # (Auto)  0.0  


 


Neutrophils % (Manual)  76 H  


 


Lymphocytes % (Manual)  13 L  


 


Monocytes % (Manual)  11 H  


 


Platelet Estimate  Normal  


 


Tear Drop Cells  Slight  


 


Ovalocytes  Slight  


 


ESR   


 


PT    14.1 H


 


INR    1.2


 


APTT    26.1


 


Sodium   137 


 


Potassium   4.0 


 


Chloride   101 


 


Carbon Dioxide   26 


 


Anion Gap   14 


 


BUN   11 


 


Creatinine   0.8 


 


Est GFR ( Amer)   > 60 


 


Est GFR (Non-Af Amer)   > 60 


 


Random Glucose   106 


 


Calcium   9.1 


 


Total Bilirubin   0.5 


 


AST   46 


 


ALT   26 


 


Alkaline Phosphatase   56 


 


C-Reactive Protein   


 


Total Protein   7.5 


 


Albumin   3.6 


 


Globulin   3.9 


 


Albumin/Globulin Ratio   0.9 L 


 


Urine Color   


 


Urine Clarity   


 


Urine pH   


 


Ur Specific Gravity   


 


Urine Protein   


 


Urine Glucose (UA)   


 


Urine Ketones   


 


Urine Blood   


 


Urine Nitrate   


 


Urine Bilirubin   


 


Urine Urobilinogen   


 


Ur Leukocyte Esterase   


 


Urine RBC (Auto)   


 


Urine Microscopic WBC   


 


Ur Squamous Epith Cells   


 


Urine Opiates Screen   


 


Urine Methadone Screen   


 


Ur Barbiturates Screen   


 


Ur Phencyclidine Scrn   


 


Ur Amphetamines Screen   


 


U Benzodiazepines Scrn   


 


U Oth Cocaine Metabols   


 


U Cannabinoids Screen   


 


HIV-1 Ab Rapid Screen   














  11/26/18 11/26/18 11/26/18





  14:00 14:03 14:38


 


WBC   


 


RBC   


 


Hgb   


 


Hct   


 


MCV   


 


MCH   


 


MCHC   


 


RDW   


 


Plt Count   


 


MPV   


 


Neut % (Auto)   


 


Lymph % (Auto)   


 


Mono % (Auto)   


 


Eos % (Auto)   


 


Baso % (Auto)   


 


Neut # (Auto)   


 


Lymph # (Auto)   


 


Mono # (Auto)   


 


Eos # (Auto)   


 


Baso # (Auto)   


 


Neutrophils % (Manual)   


 


Lymphocytes % (Manual)   


 


Monocytes % (Manual)   


 


Platelet Estimate   


 


Tear Drop Cells   


 


Ovalocytes   


 


ESR  61 H  


 


PT   


 


INR   


 


APTT   


 


Sodium   


 


Potassium   


 


Chloride   


 


Carbon Dioxide   


 


Anion Gap   


 


BUN   


 


Creatinine   


 


Est GFR ( Amer)   


 


Est GFR (Non-Af Amer)   


 


Random Glucose   


 


Calcium   


 


Total Bilirubin   


 


AST   


 


ALT   


 


Alkaline Phosphatase   


 


C-Reactive Protein   58.40 H 


 


Total Protein   


 


Albumin   


 


Globulin   


 


Albumin/Globulin Ratio   


 


Urine Color    Paloma


 


Urine Clarity    Slighty-cloudy


 


Urine pH    5.0


 


Ur Specific Gravity    1.026


 


Urine Protein    Negative


 


Urine Glucose (UA)    Neg


 


Urine Ketones    Trace


 


Urine Blood    Negative


 


Urine Nitrate    Negative


 


Urine Bilirubin    Negative


 


Urine Urobilinogen    4.0


 


Ur Leukocyte Esterase    Neg


 


Urine RBC (Auto)    1


 


Urine Microscopic WBC    2


 


Ur Squamous Epith Cells    1


 


Urine Opiates Screen   


 


Urine Methadone Screen   


 


Ur Barbiturates Screen   


 


Ur Phencyclidine Scrn   


 


Ur Amphetamines Screen   


 


U Benzodiazepines Scrn   


 


U Oth Cocaine Metabols   


 


U Cannabinoids Screen   


 


HIV-1 Ab Rapid Screen   














  11/26/18 11/26/18 11/27/18





  14:38 15:53 04:30


 


WBC    10.2


 


RBC    4.10 L


 


Hgb    12.8


 


Hct    38.2


 


MCV    93.2


 


MCH    31.2 H


 


MCHC    33.5


 


RDW    12.9


 


Plt Count    412 H


 


MPV   


 


Neut % (Auto)   


 


Lymph % (Auto)   


 


Mono % (Auto)   


 


Eos % (Auto)   


 


Baso % (Auto)   


 


Neut # (Auto)   


 


Lymph # (Auto)   


 


Mono # (Auto)   


 


Eos # (Auto)   


 


Baso # (Auto)   


 


Neutrophils % (Manual)   


 


Lymphocytes % (Manual)   


 


Monocytes % (Manual)   


 


Platelet Estimate   


 


Tear Drop Cells   


 


Ovalocytes   


 


ESR   


 


PT   


 


INR   


 


APTT   


 


Sodium   


 


Potassium   


 


Chloride   


 


Carbon Dioxide   


 


Anion Gap   


 


BUN   


 


Creatinine   


 


Est GFR ( Amer)   


 


Est GFR (Non-Af Amer)   


 


Random Glucose   


 


Calcium   


 


Total Bilirubin   


 


AST   


 


ALT   


 


Alkaline Phosphatase   


 


C-Reactive Protein   


 


Total Protein   


 


Albumin   


 


Globulin   


 


Albumin/Globulin Ratio   


 


Urine Color   


 


Urine Clarity   


 


Urine pH   


 


Ur Specific Gravity   


 


Urine Protein   


 


Urine Glucose (UA)   


 


Urine Ketones   


 


Urine Blood   


 


Urine Nitrate   


 


Urine Bilirubin   


 


Urine Urobilinogen   


 


Ur Leukocyte Esterase   


 


Urine RBC (Auto)   


 


Urine Microscopic WBC   


 


Ur Squamous Epith Cells   


 


Urine Opiates Screen  Positive H  


 


Urine Methadone Screen  Negative  


 


Ur Barbiturates Screen  Negative  


 


Ur Phencyclidine Scrn  Negative  


 


Ur Amphetamines Screen  Negative  


 


U Benzodiazepines Scrn  Positive  


 


U Oth Cocaine Metabols  Negative  


 


U Cannabinoids Screen  Positive H  


 


HIV-1 Ab Rapid Screen   Non reactive 














  11/27/18





  04:30


 


WBC 


 


RBC 


 


Hgb 


 


Hct 


 


MCV 


 


MCH 


 


MCHC 


 


RDW 


 


Plt Count 


 


MPV 


 


Neut % (Auto) 


 


Lymph % (Auto) 


 


Mono % (Auto) 


 


Eos % (Auto) 


 


Baso % (Auto) 


 


Neut # (Auto) 


 


Lymph # (Auto) 


 


Mono # (Auto) 


 


Eos # (Auto) 


 


Baso # (Auto) 


 


Neutrophils % (Manual) 


 


Lymphocytes % (Manual) 


 


Monocytes % (Manual) 


 


Platelet Estimate 


 


Tear Drop Cells 


 


Ovalocytes 


 


ESR 


 


PT 


 


INR 


 


APTT 


 


Sodium  136


 


Potassium  4.6


 


Chloride  101


 


Carbon Dioxide  27


 


Anion Gap  13


 


BUN  14


 


Creatinine  0.8


 


Est GFR ( Amer)  > 60


 


Est GFR (Non-Af Amer)  > 60


 


Random Glucose  142 H


 


Calcium  9.2


 


Total Bilirubin 


 


AST 


 


ALT 


 


Alkaline Phosphatase 


 


C-Reactive Protein 


 


Total Protein 


 


Albumin 


 


Globulin 


 


Albumin/Globulin Ratio 


 


Urine Color 


 


Urine Clarity 


 


Urine pH 


 


Ur Specific Gravity 


 


Urine Protein 


 


Urine Glucose (UA) 


 


Urine Ketones 


 


Urine Blood 


 


Urine Nitrate 


 


Urine Bilirubin 


 


Urine Urobilinogen 


 


Ur Leukocyte Esterase 


 


Urine RBC (Auto) 


 


Urine Microscopic WBC 


 


Ur Squamous Epith Cells 


 


Urine Opiates Screen 


 


Urine Methadone Screen 


 


Ur Barbiturates Screen 


 


Ur Phencyclidine Scrn 


 


Ur Amphetamines Screen 


 


U Benzodiazepines Scrn 


 


U Oth Cocaine Metabols 


 


U Cannabinoids Screen 


 


HIV-1 Ab Rapid Screen 














Assessment & Plan





- Assessment and Plan (Free Text)


Assessment: 





EPIODURAL ABSCESS


DISCITIS


Plan: 





FOR NEUROSURGICAL INTERVENTION


IV ANTIBIOTICS





- Date & Time


Date: 11/27/18


Time: 09:34

## 2018-11-28 LAB
ALBUMIN SERPL-MCNC: 3 G/DL (ref 3.5–5)
ALBUMIN/GLOB SERPL: 0.8 {RATIO} (ref 1–2.1)
ALT SERPL-CCNC: 36 U/L (ref 21–72)
AST SERPL-CCNC: 46 U/L (ref 17–59)
BASOPHILS # BLD AUTO: 0 K/UL (ref 0–0.2)
BASOPHILS NFR BLD: 0.1 % (ref 0–2)
BUN SERPL-MCNC: 16 MG/DL (ref 9–20)
CALCIUM SERPL-MCNC: 8.8 MG/DL (ref 8.4–10.2)
EOSINOPHIL # BLD AUTO: 0 K/UL (ref 0–0.7)
EOSINOPHIL NFR BLD: 0 % (ref 0–4)
ERYTHROCYTE [DISTWIDTH] IN BLOOD BY AUTOMATED COUNT: 13.2 % (ref 11.5–14.5)
GFR NON-AFRICAN AMERICAN: > 60
HGB BLD-MCNC: 11.7 G/DL (ref 12–18)
HYPOCHROMIC: SLIGHT
LYMPHOCYTE: 5 % (ref 20–50)
LYMPHOCYTES # BLD AUTO: 0.6 K/UL (ref 1–4.3)
LYMPHOCYTES NFR BLD AUTO: 3.2 % (ref 20–40)
MCH RBC QN AUTO: 30.1 PG (ref 27–31)
MCHC RBC AUTO-ENTMCNC: 32.2 G/DL (ref 33–37)
MCV RBC AUTO: 93.5 FL (ref 80–94)
MONOCYTE: 5 % (ref 0–10)
MONOCYTES # BLD: 1.1 K/UL (ref 0–0.8)
MONOCYTES NFR BLD: 6 % (ref 0–10)
NEUTROPHILS # BLD: 16 K/UL (ref 1.8–7)
NEUTROPHILS NFR BLD AUTO: 90 % (ref 42–75)
NEUTROPHILS NFR BLD AUTO: 90.7 % (ref 50–75)
NRBC BLD AUTO-RTO: 0 % (ref 0–0)
PLATELET # BLD EST: NORMAL 10*3/UL
PLATELET # BLD: 388 K/UL (ref 130–400)
PMV BLD AUTO: 7.8 FL (ref 7.2–11.7)
RBC # BLD AUTO: 3.89 MIL/UL (ref 4.4–5.9)
TOTAL CELLS COUNTED BLD: 100
WBC # BLD AUTO: 17.6 K/UL (ref 4.8–10.8)

## 2018-11-28 RX ADMIN — DEXAMETHASONE SODIUM PHOSPHATE SCH MG: 4 INJECTION, SOLUTION INTRAMUSCULAR; INTRAVENOUS at 21:06

## 2018-11-28 RX ADMIN — DEXAMETHASONE SODIUM PHOSPHATE SCH MG: 4 INJECTION, SOLUTION INTRAMUSCULAR; INTRAVENOUS at 09:16

## 2018-11-28 RX ADMIN — DEXAMETHASONE SODIUM PHOSPHATE SCH MG: 4 INJECTION, SOLUTION INTRAMUSCULAR; INTRAVENOUS at 03:45

## 2018-11-28 RX ADMIN — DEXAMETHASONE SODIUM PHOSPHATE SCH MG: 4 INJECTION, SOLUTION INTRAMUSCULAR; INTRAVENOUS at 15:59

## 2018-11-28 NOTE — CP.PCM.PN
Subjective





- Date & Time of Evaluation


Date of Evaluation: 11/28/18


Time of Evaluation: 10:51





- Subjective


Subjective: 





S/P NEROSURGICAL INTERVENTION


STILL UNABLE TO MOVE LOWER EXTREMITIES


SOME MOVEMENT OF ARMS


AWAKE AND ALERT


PAIN CONTROLLED BY MS


VSS





Objective





- Vital Signs/Intake and Output


Vital Signs (last 24 hours): 


                                        











Temp Pulse Resp BP Pulse Ox


 


 98.5 F   56 L  18   108/66   99 


 


 11/28/18 08:00  11/28/18 10:00  11/28/18 10:00  11/28/18 10:00  11/28/18 10:00








Intake and Output: 


                                        











 11/28/18 11/28/18





 06:59 18:59


 


Intake Total 1550 760


 


Output Total 1300 


 


Balance 250 760














- Medications


Medications: 


                               Current Medications





Dexamethasone (Decadron Inj)  4 mg IVP Q6 [facility]


   Last Admin: 11/28/18 09:16 Dose:  4 mg


Docusate Sodium (Colace)  100 mg PO BID [facility]


   Last Admin: 11/28/18 08:14 Dose:  100 mg


Hydromorphone HCl (Dilaudid 0.2 Mg/Ml Pca)  0 mg IV PRN PRN; Protocol


   PRN Reason: Pain, moderate (4-7)


   Last Admin: 11/27/18 15:25 Dose:  6 mg


Cefepime HCl 2 gm/ Sodium (Chloride)  100 mls @ 100 mls/hr IVPB Q8 [facility]; Protocol


   Last Admin: 11/28/18 08:15 Dose:  100 mls/hr


Vancomycin HCl 1 gm/ Sodium (Chloride)  250 mls @ 125 mls/hr IVPB Q12@0100,1300 

[facility]; Protocol


   Last Admin: 11/28/18 01:09 Dose:  125 mls/hr


Lactated Ringer's (Lactated Ringer's)  1,000 mls @ 100 mls/hr IV .Q10H [facility]


   Last Admin: 11/28/18 10:38 Dose:  100 mls/hr


Naloxone HCl (Narcan)  0.1 mg IVP Q2M PRN


   PRN Reason: Shortness of Breath











- Labs


Labs: 


                                        





                                 11/28/18 04:35 





                                 11/28/18 04:35 





                                        











PT  14.1 Seconds (9.8-13.1)  H  11/26/18  10:28    


 


INR  1.2   11/26/18  10:28    


 


APTT  26.1 Seconds (25.6-37.1)   11/26/18  10:28    














- Constitutional


Appears: No Acute Distress





- Head Exam


Head Exam: ATRAUMATIC, NORMAL INSPECTION, NORMOCEPHALIC





- Eye Exam


Eye Exam: EOMI, Normal appearance, PERRL


Pupil Exam: NORMAL ACCOMODATION, PERRL





- ENT Exam


ENT Exam: Mucous Membranes Moist, Normal Exam





- Neck Exam


Neck Exam: Full ROM, Normal Inspection.  absent: Lymphadenopathy





- Respiratory Exam


Respiratory Exam: Clear to Ausculation Bilateral, NORMAL BREATHING PATTERN





- Cardiovascular Exam


Cardiovascular Exam: REGULAR RHYTHM, +S1, +S2.  absent: Murmur





- GI/Abdominal Exam


GI & Abdominal Exam: Soft, Normal Bowel Sounds.  absent: Tenderness





- Rectal Exam


Rectal Exam: NORMAL INSPECTION





- Extremities Exam


Extremities Exam: Full ROM, Normal Capillary Refill, Normal Inspection.  absent:

Joint Swelling, Pedal Edema


Additional comments: 





POOR RANGE OF MOTION OF LEGS





- Back Exam


Back Exam: NORMAL INSPECTION





- Neurological Exam


Neurological Exam: Alert, Awake, CN II-XII Intact, Oriented x3





- Psychiatric Exam


Psychiatric exam: Normal Affect, Normal Mood





- Skin


Skin Exam: Dry, Intact, Normal Color, Warm





Assessment and Plan





- Assessment and Plan (Free Text)


Assessment: 





DISCITIS


EPIDUARAL ABSCESS


PARAPLEGIA


Plan: 





CONTINUE CURRENT RX


PT/OT EVAL


WILL NEED ACUTE REHAB

## 2018-11-28 NOTE — CP.PCM.PN
<Jannette Fernandes - Last Filed: 11/28/18 14:22>





Subjective





- Date & Time of Evaluation


Date of Evaluation: 11/28/18


Time of Evaluation: 13:59





- Subjective


Subjective: 


Neurology Consultation Follow-Up Note:





Mr. Ken was seen this afternoon in ICU with significant other at bedside.  He

is POD#1  C4-T1 laminectomy and debridement of epidural abscess with 

neurosurgery.  Today he states he is feeling "much better" and he is in a much 

better mood (more talkative during interview/exam).  He admits that he still has

neck pain, however, it is improving.  He is happy that he has regained movement 

in his lower extremities.  Currently, he denies headache, dizziness, visual 

changes, chest pain, shortness of breath, nausea/vomiting, paresthesias.  Per 

patient, he attempted to ambulate with PT today but found it painful and 

difficult.  





Objective





- Vital Signs/Intake and Output


Vital Signs (last 24 hours): 


                                        











Temp Pulse Resp BP Pulse Ox


 


 98 F   60   18   110/55 L  97 


 


 11/28/18 12:00  11/28/18 13:33  11/28/18 13:00  11/28/18 13:00  11/28/18 13:33








Intake and Output: 


                                        











 11/28/18 11/28/18





 06:59 18:59


 


Intake Total 1550 1080


 


Output Total 1300 


 


Balance 250 1080














- Medications


Medications: 


                               Current Medications





Dexamethasone (Decadron Inj)  4 mg IVP Q6 [facility]


   Last Admin: 11/28/18 09:16 Dose:  4 mg


Docusate Sodium (Colace)  100 mg PO BID [facility]


   Last Admin: 11/28/18 08:14 Dose:  100 mg


Hydromorphone HCl (Dilaudid 0.2 Mg/Ml Pca)  0 mg IV PRN PRN; Protocol


   PRN Reason: Pain, moderate (4-7)


   Last Admin: 11/27/18 15:25 Dose:  6 mg


Cefepime HCl 2 gm/ Sodium (Chloride)  100 mls @ 100 mls/hr IVPB Q8 [facility]; Protocol


   Last Admin: 11/28/18 08:15 Dose:  100 mls/hr


Vancomycin HCl 1 gm/ Sodium (Chloride)  250 mls @ 125 mls/hr IVPB Q12@0100,1300 

[facility]; Protocol


   Last Admin: 11/28/18 12:02 Dose:  125 mls/hr


Lactated Ringer's (Lactated Ringer's)  1,000 mls @ 100 mls/hr IV .Q10H [facility]


   Last Admin: 11/28/18 10:38 Dose:  100 mls/hr


Naloxone HCl (Narcan)  0.1 mg IVP Q2M PRN


   PRN Reason: Shortness of Breath











- Labs


Labs: 


                                        





                                 11/28/18 04:35 





                                 11/28/18 04:35 





                                        











PT  14.1 Seconds (9.8-13.1)  H  11/26/18  10:28    


 


INR  1.2   11/26/18  10:28    


 


APTT  26.1 Seconds (25.6-37.1)   11/26/18  10:28    














- Constitutional


Appears: Well, Non-toxic, No Acute Distress





- Head Exam


Head Exam: ATRAUMATIC, NORMAL INSPECTION, NORMOCEPHALIC


Additional comments: 





patient has on [place] J-collar.





- Eye Exam


Eye Exam: EOMI, Normal appearance, PERRL


Pupil Exam: NORMAL ACCOMODATION, PERRL





- ENT Exam


ENT Exam: Mucous Membranes Moist





- Neck Exam


Additional comments: 





patient has on [place] J-Collar, limited movement.





- Respiratory Exam


Respiratory Exam: NORMAL BREATHING PATTERN





- Extremities Exam


Extremities Exam: Normal Inspection.  absent: Pedal Edema





- Neurological Exam


Neurological Exam: Alert, Awake, CN II-XII Intact, Oriented x3


Neuro motor strength exam: Left Upper Extremity: 5, Right Upper Extremity: 5, Le

ft Lower Extremity: 5 (see additional comments), Right Lower Extremity: 5 (see 

additional comments)


Additional comments: 


Speech clear, fluid.


Sensation intact and equal bilaterally, however, slightly diminished to both LE 

compared to UE and face.


Able to raise LLE and RLE independently and against resistance 5/5.  Bilateral 

quad strength 5/5. Dorsiflexion bilaterally 5/5. Anterior tibial movement 3/5 

against resistance bilaterally.  


Overall strength is strong and equal which is a huge improvement compared to day

of admission. 


Reflexes brisk.


Gait not assessed due to patient's condition. 








- Psychiatric Exam


Psychiatric exam: Normal Affect, Normal Mood





- Skin


Skin Exam: Normal Color





Assessment and Plan


(1) Epidural abscess


Assessment & Plan: 


Imaging:





-MRI C-Spine (11/26/18): 1. C6-7 intervertebral disc fluid with edema at C6 and 

C7 vertebral bodies related to the mid to posterior endplate distribution 

suspicious for discitis osteomyelitis. Epidural fluid is identified more 

posteriorly than anteriorly from at least C4-T1 levels with cord compressed at 

C6 and C7. Contrast MRI cervical spine can be utilized for added characte

rization.   2. No definitive disc herniation appreciable though an annular tear 

is not excluded at the C6-7 disc posteriorly.   3. Mild reversal cervical 

curvature. Definitive fracture or spondylolisthesis appreciable. 


-MRI L-Spine (11/26/18): 1. Small right paracentral disc protrusion L5-S1 withou

t significant stenosis resulting.  2. Minimal disc bulge L4-5 without stenosis. 

3. Widely patent central canal and bilateral neural foramina throughout.


-MRI T-Spine (11/26/18): Unremarkable non-contrast enhanced MRI of the thoracic 

spine.  Incidental edema at C7 vertebral body is detailed in separate cervical 

spine MRI also performed 11/26/2018.  Please see separate report.





-Mr. Ken is improving.  He is POD#1  C4-T1 laminectomy and debridement of 

epidural abscess with neurosurgery.


-Maintain [place] J-Collar per neurosurgery


-PT/OT


-Fall precautions.


-ID on case for antibiotic management.  Recommending a ASHLEY once patient is 

stable.


-Please notify neuro team of any acute changes in patient's condition


-We will continue to follow the patient while he is in the hospital. 





Case discussed with Dr. Rodriguez.


Thank you for allowing us to participate in this patient's care.


Status: Acute   





<Festus Rodriguez - Last Filed: 11/30/18 00:47>





Objective





- Vital Signs/Intake and Output


Vital Signs (last 24 hours): 


                                        











Temp Pulse Resp BP Pulse Ox


 


 98.2 F   51 L  14   118/71   98 


 


 11/29/18 20:00  11/29/18 20:00  11/29/18 20:00  11/29/18 20:00  11/29/18 20:00








Intake and Output: 


                                        











 11/29/18 11/30/18





 18:59 06:59


 


Intake Total 1667 120


 


Output Total 1150 


 


Balance 517 120














- Medications


Medications: 


                               Current Medications





Dexamethasone (Decadron Inj)  4 mg IVP Q6 [facility]


   Last Admin: 11/29/18 21:29 Dose:  4 mg


Docusate Sodium (Colace)  100 mg PO BID [facility]


   Last Admin: 11/29/18 17:27 Dose:  100 mg


Hydromorphone HCl (Dilaudid)  1 mg IVP Q6 PRN


   PRN Reason: Pain, moderate (4-7)


   Last Admin: 11/29/18 21:48 Dose:  1 mg


Cefepime HCl 2 gm/ Sodium (Chloride)  100 mls @ 100 mls/hr IVPB Q8 [facility]; Protocol


   Last Admin: 11/30/18 00:31 Dose:  100 mls/hr


Vancomycin HCl 1 gm/ Sodium (Chloride)  250 mls @ 125 mls/hr IVPB Q12@0100,1300 

[facility]; Protocol


   Last Admin: 11/30/18 00:32 Dose:  125 mls/hr


Lactated Ringer's (Lactated Ringer's)  1,000 mls @ 100 mls/hr IV .Q10H [facility]


   Last Admin: 11/28/18 10:38 Dose:  100 mls/hr


Naloxone HCl (Narcan)  0.1 mg IVP Q2M PRN


   PRN Reason: Shortness of Breath











- Labs


Labs: 


                                        





                                 11/29/18 04:40 





                                 11/29/18 04:40 





                                        











PT  14.1 Seconds (9.8-13.1)  H  11/26/18  10:28    


 


INR  1.2   11/26/18  10:28    


 


APTT  26.1 Seconds (25.6-37.1)   11/26/18  10:28    














Assessment and Plan





- Assessment and Plan (Free Text)


Assessment: 





agree with assessment and plan.


Thank you


Dr. rodriguez

## 2018-11-28 NOTE — CP.PCM.PN
Subjective





- Date & Time of Evaluation


Date of Evaluation: 11/28/18


Time of Evaluation: 12:40





- Subjective


Subjective: 





SPINE - POD #1





Pt OOB in chair. [place]-J collar in place. States post-op pain not too bad. 





VSS. Afebrile. Moves both upper extremities actively. Neiro grossly intact. 

Remarkably, he is now actively moving both LE's! Has good quad strength bilat, 

able to lift each leg off the chair. Not much ant tib movement but does have EHL

activity bilat. 





Plan: OK from our viewpoint for pt to be on Med/Surg floor. Needs extensive 

rehab now that he has appeared to regain motor in his legs.





Objective





- Vital Signs/Intake and Output


Vital Signs (last 24 hours): 


                                        











Temp Pulse Resp BP Pulse Ox


 


 98 F   54 L  18   114/60   99 


 


 11/28/18 12:00  11/28/18 12:00  11/28/18 12:00  11/28/18 12:00  11/28/18 12:00








Intake and Output: 


                                        











 11/28/18 11/28/18





 06:59 18:59


 


Intake Total 1550 1080


 


Output Total 1300 


 


Balance 250 1080














- Medications


Medications: 


                               Current Medications





Dexamethasone (Decadron Inj)  4 mg IVP Q6 [facility]


   Last Admin: 11/28/18 09:16 Dose:  4 mg


Docusate Sodium (Colace)  100 mg PO BID [facility]


   Last Admin: 11/28/18 08:14 Dose:  100 mg


Hydromorphone HCl (Dilaudid 0.2 Mg/Ml Pca)  0 mg IV PRN PRN; Protocol


   PRN Reason: Pain, moderate (4-7)


   Last Admin: 11/27/18 15:25 Dose:  6 mg


Cefepime HCl 2 gm/ Sodium (Chloride)  100 mls @ 100 mls/hr IVPB Q8 [facility]; Protocol


   Last Admin: 11/28/18 08:15 Dose:  100 mls/hr


Vancomycin HCl 1 gm/ Sodium (Chloride)  250 mls @ 125 mls/hr IVPB Q12@0100,1300 

[facility]; Protocol


   Last Admin: 11/28/18 12:02 Dose:  125 mls/hr


Lactated Ringer's (Lactated Ringer's)  1,000 mls @ 100 mls/hr IV .Q10H [facility]


   Last Admin: 11/28/18 10:38 Dose:  100 mls/hr


Naloxone HCl (Narcan)  0.1 mg IVP Q2M PRN


   PRN Reason: Shortness of Breath











- Labs


Labs: 


                                        





                                 11/28/18 04:35 





                                 11/28/18 04:35 





                                        











PT  14.1 Seconds (9.8-13.1)  H  11/26/18  10:28    


 


INR  1.2   11/26/18  10:28    


 


APTT  26.1 Seconds (25.6-37.1)   11/26/18  10:28

## 2018-11-28 NOTE — CP.PCM.PN
Subjective





- Date & Time of Evaluation


Date of Evaluation: 11/28/18


Time of Evaluation: 07:00





- Subjective


Subjective: 





awake alert


moving upper extremities


lower extrem weak but lifting off bed 





Objective





- Vital Signs/Intake and Output


Vital Signs (last 24 hours): 


                                        











Temp Pulse Resp BP Pulse Ox


 


 98 F   54 L  18   114/60   99 


 


 11/28/18 12:00  11/28/18 12:00  11/28/18 12:00  11/28/18 12:00  11/28/18 12:00








Intake and Output: 


                                        











 11/28/18 11/28/18





 06:59 18:59


 


Intake Total 1550 1080


 


Output Total 1300 


 


Balance 250 1080














- Medications


Medications: 


                               Current Medications





Dexamethasone (Decadron Inj)  4 mg IVP Q6 SERENA


   Last Admin: 11/28/18 09:16 Dose:  4 mg


Docusate Sodium (Colace)  100 mg PO BID SERENA


   Last Admin: 11/28/18 08:14 Dose:  100 mg


Hydromorphone HCl (Dilaudid 0.2 Mg/Ml Pca)  0 mg IV PRN PRN; Protocol


   PRN Reason: Pain, moderate (4-7)


   Last Admin: 11/27/18 15:25 Dose:  6 mg


Cefepime HCl 2 gm/ Sodium (Chloride)  100 mls @ 100 mls/hr IVPB Q8 SERENA; Protocol


   Last Admin: 11/28/18 08:15 Dose:  100 mls/hr


Vancomycin HCl 1 gm/ Sodium (Chloride)  250 mls @ 125 mls/hr IVPB Q12@0100,1300 

SERENA; Protocol


   Last Admin: 11/28/18 12:02 Dose:  125 mls/hr


Lactated Ringer's (Lactated Ringer's)  1,000 mls @ 100 mls/hr IV .Q10H SERENA


   Last Admin: 11/28/18 10:38 Dose:  100 mls/hr


Naloxone HCl (Narcan)  0.1 mg IVP Q2M PRN


   PRN Reason: Shortness of Breath











- Labs


Labs: 


                                        





                                 11/28/18 04:35 





                                 11/28/18 04:35 





                                        











PT  14.1 Seconds (9.8-13.1)  H  11/26/18  10:28    


 


INR  1.2   11/26/18  10:28    


 


APTT  26.1 Seconds (25.6-37.1)   11/26/18  10:28    














- Constitutional


Appears: Non-toxic, Chronically Ill





- Head Exam


Head Exam: NORMOCEPHALIC





- Eye Exam


Eye Exam: absent: Scleral icterus





- ENT Exam


ENT Exam: Mucous Membranes Dry





- Neck Exam


Neck Exam: absent: Lymphadenopathy





- Respiratory Exam


Respiratory Exam: Decreased Breath Sounds





- Cardiovascular Exam


Cardiovascular Exam: REGULAR RHYTHM





- GI/Abdominal Exam


GI & Abdominal Exam: Distended, Soft





- Rectal Exam


Rectal Exam: Deferred





- Back Exam


Back Exam: absent: CVA tenderness (L), CVA tenderness (R)





- Neurological Exam


Neurological Exam: Alert, Awake, Oriented x3


Neuro motor strength exam: Left Upper Extremity: 4, Right Upper Extremity: 4, 

Left Lower Extremity: 2/1, Right Lower Extremity: 2/1





- Psychiatric Exam


Psychiatric exam: Depressed





- Skin


Skin Exam: Dry





Assessment and Plan


(1) Discitis of cervical region


Status: Acute   





(2) Epidural abscess


Status: Acute   





(3) Osteomyelitis of cervical spine


Status: Acute   





- Assessment and Plan (Free Text)


Assessment: 





will repeat vanco level


needs echo 


consider ASHLEY when stable neurologically

## 2018-11-28 NOTE — OP
PROCEDURE DATE:  11/27/2018



PREOPERATIVE DIAGNOSIS:  Cervical epidural abscess.



POSTOPERATIVE DIAGNOSIS:  Cervical epidural abscess.



PROCEDURES:  C4 through T1 laminectomy, debridement of epidural abscess.



SURGEON:  Alexis Monzon MD



ASSISTANT:  Bobby Ireland MD



ANESTHESIA:  General endotracheal.



ESTIMATED BLOOD LOSS:  50 mL.



COMPLICATIONS:  None.



JUSTIFICATION:  The patient unfortunately developed severe leg weakness,

bowel and bladder incontinence 5 days prior  _____  he only presented

himself to the hospital after 4 days of being unable to walk, urinate, etc.

He was found to have a diskitis at C6-C7 with a posterior epidural abscess,

predominantly from C5 through C7.  The patient was offered the possibility

of decompression by a posterior laminectomy debridement with the

understanding that this would really not benefit him as far as his lower

extremities or bowel or bladder function go but hopefully preserved his

upper extremity function and _____  to treat the nature of the procedure,

the rationale behind it, potential risks, complications, and alternatives

were discussed with the patient at length.  All his questions were

answered.  He fully understood all of the above and elected to proceed as

offered.



DESCRIPTION OF PROCEDURE:  The patient was taken to the operating room.  He

was intubated, anesthetized, hooked up to the neurophysiological

monitoring, he was placed in a [place] head fixator.  He was turned into a

prone position on chest bolsters on the OR table and placed in a prone

position with head fixated in neutral position.  The entire cervical region

was then scrubbed with acetone and scrub painted and draped in the usual

sterile manner.



Incision was localized with lateral fluoroscopy overlying the spinous

processes at C4 through T1.  Incision was made with 10-blade knife, carried

down to the level of the fascia.  The Bovie cautery was used to incise the

fascia and then stripped the paraspinal muscles off the spinous processes

or lamina of C4 through T1.  Confirmatory x-ray was taken.  The

self-retaining retractors were placed.



At this point, we selected our rongeurs to remove the spinous processes of

C5, C6, and C7 as well as double down the bottom of C4 and top of C1.  We

then used a high speed drill to thin down the lamina.  At this point, a 2

mm Kerrison rongeur was used to begin the laminectomy at the inferior

margin of C7, this was taken through C7.  I used a blunt nerve hook between

the ligamentum flavum, which unroofed a rather fibrinous abscess like

material.  A laminectomy was then carried out through C6, C5, and the

inferior C4, at which point we noticed that there was far more room between

the lamina and dura.  Similarly, the top of C1 was removed as well, but

again noted to be relatively not compressed.



At this point, I _____ nerve hook directly into the middle of the abscess

which was quite thick and adherent to the dura, but able to cut upon it.  I

removed several pieces of piecemeal, which were sent for pathology.  This

was done after a standard culture was taken.  I was able to gently put it

down the middle of _____ fair amount of abscess of the posterior dura on

both sides, thereby revealing normal dura.  Lastly, at this point, we _____

slightly widen our decompression which was done on both sides, but not to

really violate much of the facet joints on either side.  Lastly, we paid

particular attention to the foramina on the left at C6-C7 and C7-T1, which

is where we did have a little bit of _____  weakness where we performed a

foraminotomy with the Kerrison rongeurs.



We used little bit amounts of antibiotic irrigation after which a layer of

Gelfoam was placed in the posterior epidural space.  Retractors were

removed.



The muscle was reapproximated using interrupted 0 Vicryl.  The fascia was

closed using a tight interrupted 0 Vicryl stitch.  The wound was again

irrigated with antibiotic solution.  Subcutaneous was closed in two

separate layers and interrupted inverted 2-0 Vicryl, and the skin was

closed with staples.  Bacitracin ointment and a self-securing dressing was

placed.  The patient was _____  [place] J collar.  He was disconnected from

the table, carefully turned back in supine position on the bed.  Ward

head fixator was removed.



The patient was aroused from the anesthesia, brought to recovery room in

satisfactory condition.  He was noted to be moving his upper extremities

with excellent strength throughout as he did preop with no movement at all

in lower extremities in the recovery room.  All counts were correct. 

Neurophysiological monitoring which was only able to obtain motor and

sensory responses in the upper extremities remained stable over the

procedure.  There were no complications.





__________________________________________

Alexis Monzon MD



DD:  11/27/2018 15:05:29

DT:  11/28/2018 0:09:37

[facility] # [id number]

## 2018-11-28 NOTE — CON
DATE:  11/27/2018



HISTORY OF PRESENT ILLNESS:  This is a 33-year-old individual that

apparently noticed left leg weakness, difficulty walking, already on

Thanksgiving that is 4 days before, being seen in the [place] ER.  He

states that he had been unable to walk since then.  The next day, he

developed urinary incontinence, and basically inability to move his leg. 

He has not had a bowel movement now for a week, and for a completely

unclear reason, he has delayed it 4 days, but presenting himself to the

emergency room where he was found to be incontinent and paraplegic.



The patient is complaining of neck pain.  He also notes a little bit of

weakness in the fingers of his left hand, but otherwise, does not have

upper extremity symptoms.



The patient denies any IV drug use; however, his urine _____ positive for

opiates.  He does admit to significant marijuana use.  He also smokes

tobacco.



PAST MEDICAL HISTORY:  Denies other medical history.



PHYSICAL EXAMINATION:

GENERAL:  The patient has minimal twitches of his quads.

MUSCULATURE:  Cannot break gravity with anything in his lower extremities,

states he has vague sensation, but it is quite inconsistent.  His

proprioception is quite well bilaterally.  Right upper extremity is

completely intact as is the left upper extremity but has some very mild

questionable weakness in the interossei.



LABORATORY DATA:  MRI of the cervical spine documents a posterior epidural

abscess, especially from the bottom of C4 through C7.  There is significant

posterior spinal cord compression.  There is diskitis involving the C7, a

little bit of anterior compression but that is relatively minimal compared

to the posterior.



IMPRESSION AND PLAN:  I did have a long talk with the patient.  I explained

to him that after 4 days of not walking, his chances of recovery or his

ordinary function is close to nil.  However, I still as dictated posterior

decompression by multilevel laminectomy to get him perhaps the best chance

of preventing loss of function in the upper extremities.  He indicated he

want to think about it and discuss with his family.



Overnight, he did _____ study, wants to go ahead with the procedure and

thus he will be brought to the operating room in the a.m. for the above. 

The nature of the procedure, the role of the chance of success, potential

risks and complications were discussed with the family, and questions were

answered.





__________________________________________

Alexis Monzon MD



DD:  11/27/2018 11:15:27

DT:  11/27/2018 13:00:47

[facility] # [id number]

## 2018-11-29 LAB
BUN SERPL-MCNC: 17 MG/DL (ref 9–20)
CALCIUM SERPL-MCNC: 9 MG/DL (ref 8.4–10.2)
ERYTHROCYTE [DISTWIDTH] IN BLOOD BY AUTOMATED COUNT: 13.1 % (ref 11.5–14.5)
GFR NON-AFRICAN AMERICAN: > 60
HGB BLD-MCNC: 11.2 G/DL (ref 12–18)
MCH RBC QN AUTO: 30.8 PG (ref 27–31)
MCHC RBC AUTO-ENTMCNC: 33 G/DL (ref 33–37)
MCV RBC AUTO: 93.4 FL (ref 80–94)
PLATELET # BLD: 361 K/UL (ref 130–400)
RBC # BLD AUTO: 3.63 MIL/UL (ref 4.4–5.9)
WBC # BLD AUTO: 14.6 K/UL (ref 4.8–10.8)

## 2018-11-29 RX ADMIN — DEXAMETHASONE SODIUM PHOSPHATE SCH MG: 4 INJECTION, SOLUTION INTRAMUSCULAR; INTRAVENOUS at 21:29

## 2018-11-29 RX ADMIN — DEXAMETHASONE SODIUM PHOSPHATE SCH MG: 4 INJECTION, SOLUTION INTRAMUSCULAR; INTRAVENOUS at 04:58

## 2018-11-29 RX ADMIN — DEXAMETHASONE SODIUM PHOSPHATE SCH MG: 4 INJECTION, SOLUTION INTRAMUSCULAR; INTRAVENOUS at 17:27

## 2018-11-29 RX ADMIN — DEXAMETHASONE SODIUM PHOSPHATE SCH MG: 4 INJECTION, SOLUTION INTRAMUSCULAR; INTRAVENOUS at 09:33

## 2018-11-29 NOTE — CP.PCM.PN
<Jannette Fernandes - Last Filed: 11/29/18 16:10>





Subjective





- Date & Time of Evaluation


Date of Evaluation: 11/29/18


Time of Evaluation: 15:00





- Subjective


Subjective: 





Neurology Consultation Follow-Up Note:





Attempted to see Mr. Ken this afternoon in ICU.  He is POD#2  C4-T1 

laminectomy and debridement of epidural abscess with neurosurgery.  


He could not be assessed by me; pt was asleep.  Per nursing staff pt was OOB to 

chair today with PT for approximately 4-5 hours today and just just polaced back

in bed prior to my arrival to the unit.  Per nursing, the plan is to observe him

in the ICU for one more night then d/c him to rehab on the 6th floor tomorrow.  





Objective





- Vital Signs/Intake and Output


Vital Signs (last 24 hours): 


                                        











Temp Pulse Resp BP Pulse Ox


 


 97.9 F   62   14   117/64   100 


 


 11/29/18 08:00  11/29/18 12:00  11/29/18 09:00  11/29/18 08:00  11/29/18 09:00








Intake and Output: 


                                        











 11/29/18 11/29/18





 06:59 18:59


 


Intake Total 1390 1079


 


Output Total 800 


 


Balance 590 1079














- Medications


Medications: 


                               Current Medications





Cyclobenzaprine HCl (Flexeril)  10 mg PO Q8 SERENA


   Last Admin: 11/29/18 12:52 Dose:  10 mg


Dexamethasone (Decadron Inj)  4 mg IVP Q6 SERENA


   Last Admin: 11/29/18 09:33 Dose:  4 mg


Docusate Sodium (Colace)  100 mg PO BID SERENA


   Last Admin: 11/29/18 08:49 Dose:  100 mg


Cefepime HCl 2 gm/ Sodium (Chloride)  100 mls @ 100 mls/hr IVPB Q8 SERENA; Protocol


   Last Admin: 11/29/18 08:51 Dose:  100 mls/hr


Vancomycin HCl 1 gm/ Sodium (Chloride)  250 mls @ 125 mls/hr IVPB Q12@0100,1300 

SERENA; Protocol


   Last Admin: 11/29/18 12:52 Dose:  125 mls/hr


Lactated Ringer's (Lactated Ringer's)  1,000 mls @ 100 mls/hr IV .Q10H SERENA


   Last Admin: 11/28/18 10:38 Dose:  100 mls/hr


Naloxone HCl (Narcan)  0.1 mg IVP Q2M PRN


   PRN Reason: Shortness of Breath











- Labs


Labs: 


                                        





                                 11/29/18 04:40 





                                 11/29/18 04:40 





                                        











PT  14.1 Seconds (9.8-13.1)  H  11/26/18  10:28    


 


INR  1.2   11/26/18  10:28    


 


APTT  26.1 Seconds (25.6-37.1)   11/26/18  10:28    














- Constitutional


Appears: Well (sleeping; could not assess), Toxic, No Acute Distress





Assessment and Plan


(1) Epidural abscess


Assessment & Plan: 


Imaging:





-MRI C-Spine (11/26/18): 1. C6-7 intervertebral disc fluid with edema at C6 and 

C7 vertebral bodies related to the mid to posterior endplate distribution 

suspicious for discitis osteomyelitis. Epidural fluid is identified more 

posteriorly than anteriorly from at least C4-T1 levels with cord compressed at 

C6 and C7. Contrast MRI cervical spine can be utilized for added 

characterization.   2. No definitive disc herniation appreciable though an 

annular tear is not excluded at the C6-7 disc posteriorly.   3. Mild reversal 

cervical curvature. Definitive fracture or spondylolisthesis appreciable. 


-MRI L-Spine (11/26/18): 1. Small right paracentral disc protrusion L5-S1 

without significant stenosis resulting.  2. Minimal disc bulge L4-5 without 

stenosis.  3. Widely patent central canal and bilateral neural foramina 

throughout.


-MRI T-Spine (11/26/18): Unremarkable non-contrast enhanced MRI of the thoracic 

spine.  Incidental edema at C7 vertebral body is detailed in separate cervical 

spine MRI also performed 11/26/2018.  Please see separate report.





-Mr. Ken is doing well considering his condition upon admission.  He is POD#2

 C4-T1 laminectomy and debridement of epidural abscess with neurosurgery.


-Maintain Bill Moore's [place] J-Collar per neurosurgery


-PT/OT; discharge to rehab is planned for tomorrow. 


-Fall precautions.


-ID on case for antibiotic management and is recommending a ASHLEY once patient is 

stable.


-Please notify neuro team of any acute changes in patient's condition


-We will continue to follow the patient while he is in the hospital as well as 

in rehab if consulted.


-Dr. Rodriguez to see the pt this evening for full physical exam.  





Case discussed with Dr. Rodriguez.


Thank you for allowing us to participate in this patient's care.


Status: Acute   





<Festus Rodriguez - Last Filed: 11/29/18 23:45>





Objective





- Vital Signs/Intake and Output


Vital Signs (last 24 hours): 


                                        











Temp Pulse Resp BP Pulse Ox


 


 98.2 F   51 L  14   118/71   98 


 


 11/29/18 20:00  11/29/18 20:00  11/29/18 20:00  11/29/18 20:00  11/29/18 20:00








Intake and Output: 


                                        











 11/29/18 11/30/18





 18:59 06:59


 


Intake Total 1667 120


 


Output Total 1150 


 


Balance 517 120














- Medications


Medications: 


                               Current Medications





Dexamethasone (Decadron Inj)  4 mg IVP Q6 SERENA


   Last Admin: 11/29/18 21:29 Dose:  4 mg


Docusate Sodium (Colace)  100 mg PO BID SERENA


   Last Admin: 11/29/18 17:27 Dose:  100 mg


Hydromorphone HCl (Dilaudid)  1 mg IVP Q6 PRN


   PRN Reason: Pain, moderate (4-7)


   Last Admin: 11/29/18 21:48 Dose:  1 mg


Cefepime HCl 2 gm/ Sodium (Chloride)  100 mls @ 100 mls/hr IVPB Q8 SERENA; Protocol


   Last Admin: 11/29/18 17:32 Dose:  100 mls/hr


Vancomycin HCl 1 gm/ Sodium (Chloride)  250 mls @ 125 mls/hr IVPB Q12@0100,1300 

SERENA; Protocol


   Last Admin: 11/29/18 12:52 Dose:  125 mls/hr


Lactated Ringer's (Lactated Ringer's)  1,000 mls @ 100 mls/hr IV .Q10H SERENA


   Last Admin: 11/28/18 10:38 Dose:  100 mls/hr


Naloxone HCl (Narcan)  0.1 mg IVP Q2M PRN


   PRN Reason: Shortness of Breath











- Labs


Labs: 


                                        





                                 11/29/18 04:40 





                                 11/29/18 04:40 





                                        











PT  14.1 Seconds (9.8-13.1)  H  11/26/18  10:28    


 


INR  1.2   11/26/18  10:28    


 


APTT  26.1 Seconds (25.6-37.1)   11/26/18  10:28    














Assessment and Plan





- Assessment and Plan (Free Text)


Assessment: 





Agree with above assessment and plan. 


Patient examined and noted to be 3+/5 proximally in lower limbs with brisk 

reflexes. 


 is 4/5 bilaterally. 


Gait not tested. 


Dr. rodriguez

## 2018-11-29 NOTE — CP.PCM.PN
Subjective





- Date & Time of Evaluation


Date of Evaluation: 11/29/18


Time of Evaluation: 10:00





- Subjective


Subjective: 





moving all 4 extrmities


cultures so far negative


echo pending





Objective





- Vital Signs/Intake and Output


Vital Signs (last 24 hours): 


                                        











Temp Pulse Resp BP Pulse Ox


 


 97.8 F   48 L  14   117/66   100 


 


 11/29/18 16:00  11/29/18 17:00  11/29/18 09:00  11/29/18 16:00  11/29/18 09:00








Intake and Output: 


                                        











 11/29/18 11/29/18





 06:59 18:59


 


Intake Total 1390 1667


 


Output Total 800 1150


 


Balance 590 517














- Medications


Medications: 


                               Current Medications





Dexamethasone (Decadron Inj)  4 mg IVP Q6 SERENA


   Last Admin: 11/29/18 17:27 Dose:  4 mg


Docusate Sodium (Colace)  100 mg PO BID SERENA


   Last Admin: 11/29/18 17:27 Dose:  100 mg


Hydromorphone HCl (Dilaudid)  1 mg IVP Q6 PRN


   PRN Reason: Pain, moderate (4-7)


Cefepime HCl 2 gm/ Sodium (Chloride)  100 mls @ 100 mls/hr IVPB Q8 SERENA; Protocol


   Last Admin: 11/29/18 17:32 Dose:  100 mls/hr


Vancomycin HCl 1 gm/ Sodium (Chloride)  250 mls @ 125 mls/hr IVPB Q12@0100,1300 

SERENA; Protocol


   Last Admin: 11/29/18 12:52 Dose:  125 mls/hr


Lactated Ringer's (Lactated Ringer's)  1,000 mls @ 100 mls/hr IV .Q10H SERENA


   Last Admin: 11/28/18 10:38 Dose:  100 mls/hr


Naloxone HCl (Narcan)  0.1 mg IVP Q2M PRN


   PRN Reason: Shortness of Breath











- Labs


Labs: 


                                        





                                 11/29/18 04:40 





                                 11/29/18 04:40 





                                        











PT  14.1 Seconds (9.8-13.1)  H  11/26/18  10:28    


 


INR  1.2   11/26/18  10:28    


 


APTT  26.1 Seconds (25.6-37.1)   11/26/18  10:28    














- Constitutional


Appears: Non-toxic, Chronically Ill





- Head Exam


Head Exam: NORMOCEPHALIC





- Eye Exam


Eye Exam: absent: Scleral icterus





- ENT Exam


ENT Exam: Mucous Membranes Dry





- Neck Exam


Neck Exam: absent: Lymphadenopathy





- Respiratory Exam


Respiratory Exam: Decreased Breath Sounds





- Cardiovascular Exam


Cardiovascular Exam: REGULAR RHYTHM





- GI/Abdominal Exam


GI & Abdominal Exam: Distended, Soft





- Rectal Exam


Rectal Exam: Deferred





-  Exam


 Exam: NORMAL INSPECTION





- Extremities Exam


Extremities Exam: absent: Pedal Edema





- Back Exam


Back Exam: absent: CVA tenderness (L), CVA tenderness (R)





- Neurological Exam


Neurological Exam: Alert, Awake, Oriented x3


Neuro motor strength exam: Left Upper Extremity: 4, Right Upper Extremity: 4, 

Left Lower Extremity: 3, Right Lower Extremity: 3





- Psychiatric Exam


Psychiatric exam: Normal Mood





- Skin


Skin Exam: Dry





Assessment and Plan


(1) Discitis of cervical region


Status: Acute   





(2) Epidural abscess


Status: Acute   





(3) Osteomyelitis of cervical spine


Status: Acute

## 2018-11-29 NOTE — CP.PCM.PN
Subjective





- Date & Time of Evaluation


Date of Evaluation: 11/29/18


Time of Evaluation: 08:54





- Subjective


Subjective: 





AWAKE/ALERT AND ORIENTED X 3


FEELS BETTER


MOVES ALL EXTREMITIES AGAINST GRAVITY


ALL CULTURES PENDING





Objective





- Vital Signs/Intake and Output


Vital Signs (last 24 hours): 


                                        











Temp Pulse Resp BP Pulse Ox


 


 97.9 F   47 L  17   117/64   100 


 


 11/29/18 08:00  11/29/18 08:00  11/29/18 08:00  11/29/18 08:00  11/29/18 08:00








Intake and Output: 


                                        











 11/29/18 11/29/18





 06:59 18:59


 


Intake Total 1390 


 


Output Total 800 


 


Balance 590 














- Medications


Medications: 


                               Current Medications





Dexamethasone (Decadron Inj)  4 mg IVP Q6 [facility]


   Last Admin: 11/29/18 04:58 Dose:  4 mg


Docusate Sodium (Colace)  100 mg PO BID SERENA


   Last Admin: 11/28/18 15:59 Dose:  100 mg


Cefepime HCl 2 gm/ Sodium (Chloride)  100 mls @ 100 mls/hr IVPB Q8 SERENA; Protocol


   Last Admin: 11/29/18 01:43 Dose:  100 mls/hr


Vancomycin HCl 1 gm/ Sodium (Chloride)  250 mls @ 125 mls/hr IVPB Q12@0100,1300 

SERENA; Protocol


   Last Admin: 11/29/18 02:13 Dose:  125 mls/hr


Lactated Ringer's (Lactated Ringer's)  1,000 mls @ 100 mls/hr IV .Q10H SERENA


   Last Admin: 11/28/18 10:38 Dose:  100 mls/hr


Naloxone HCl (Narcan)  0.1 mg IVP Q2M PRN


   PRN Reason: Shortness of Breath











- Labs


Labs: 


                                        





                                 11/29/18 04:40 





                                 11/29/18 04:40 





                                        











PT  14.1 Seconds (9.8-13.1)  H  11/26/18  10:28    


 


INR  1.2   11/26/18  10:28    


 


APTT  26.1 Seconds (25.6-37.1)   11/26/18  10:28    














- Constitutional


Appears: No Acute Distress





- Head Exam


Head Exam: ATRAUMATIC, NORMAL INSPECTION, NORMOCEPHALIC





- Eye Exam


Eye Exam: EOMI, Normal appearance, PERRL


Pupil Exam: NORMAL ACCOMODATION, PERRL





- ENT Exam


ENT Exam: Mucous Membranes Moist, Normal Exam





- Neck Exam


Neck Exam: Full ROM, Normal Inspection.  absent: Lymphadenopathy





- Respiratory Exam


Respiratory Exam: Clear to Ausculation Bilateral, NORMAL BREATHING PATTERN





- Cardiovascular Exam


Cardiovascular Exam: REGULAR RHYTHM, +S1, +S2.  absent: Murmur





- GI/Abdominal Exam


GI & Abdominal Exam: Soft, Normal Bowel Sounds.  absent: Tenderness





- Rectal Exam


Rectal Exam: NORMAL INSPECTION





- Extremities Exam


Extremities Exam: Full ROM, Normal Capillary Refill, Normal Inspection.  absent:

Joint Swelling, Pedal Edema





- Back Exam


Back Exam: NORMAL INSPECTION





- Neurological Exam


Neurological Exam: Alert, Awake, CN II-XII Intact, Oriented x3


Neuro motor strength exam: Left Upper Extremity: 3, Right Upper Extremity: 2/1, 

Left Lower Extremity: 3, Right Lower Extremity: 2/1





- Psychiatric Exam


Psychiatric exam: Normal Affect, Normal Mood





- Skin


Skin Exam: Dry, Intact, Normal Color, Warm





Assessment and Plan





- Assessment and Plan (Free Text)


Assessment: 





S/P LAMINECTOMY AND DRAINAGE OF ABSCESS


PARAPARESES IMPROVING


Plan: 





MAY GO TO ACUTE REHAB


CONTINUE IV ANTIBIOTIC RX

## 2018-11-29 NOTE — CP.PCM.CON
History of Present Illness





- History of Present Illness


History of Present Illness: 





Dr Morales PMR consultation on Fausto Ken, born 1985 who has been admitted 

to [facility] for acute onset of LE>UE weakness and urinary incont.  Noted to have a 

cervical epidural abscess and underwent urgent surgery and has already had a 

very good LE recovery.  Still with catheter for urinary issue, not sure if it 

was overflow incontinence.  Seen by ID and on ABX treatment.  Having some spasms

but not constant spasticity and no real pain.  





Review of Systems





- Constitutional


Constitutional: absent: Chills, Excessive Sweating





- EENT


Eyes: absent: Change in Vision


Ears: absent: Ear Discharge, Ear Pain


Nose/Mouth/Throat: absent: Nasal Congestion, Nasal Discharge





- Cardiovascular


Cardiovascular: absent: Chest Pain





- Respiratory


Respiratory: absent: Dyspnea, Hemoptysis





- Gastrointestinal


Gastrointestinal: Constipation





- Musculoskeletal


Musculoskeletal: Muscle Weakness





- Neurological


Neurological: Paresthesias, Weakness.  absent: Dizziness, Radicular Pain





Past Patient History





- Past Medical History & Family History


Past Medical History?: Yes





- Past Social History


Smoking Status: Heavy Smoker > 10 Cigarettes Daily


Drugs: Cannabis


Home Situation {Lives}: With Family





- CARDIAC


Hx Cardiac Disorders: No





- PULMONARY


Hx Respiratory Disorders: No





- MUSCULOSKELETAL/RHEUMATOLOGICAL


Hx Falls: Yes





- PSYCHIATRIC


Hx Substance Use: No





- SURGICAL HISTORY


Hx Surgeries: Yes


Other/Comment: hernia surgery





- ANESTHESIA


Hx Anesthesia: Yes


Hx Anesthesia Reactions: No


Hx Malignant Hyperthermia: No


Has any member of the family had a problem w/ anesthesia?: No





Meds


Allergies/Adverse Reactions: 


                                    Allergies











Allergy/AdvReac Type Severity Reaction Status Date / Time


 


No Known Allergies Allergy   Verified 11/20/18 16:54














- Medications


Medications: 


                               Current Medications





Cyclobenzaprine HCl (Flexeril)  10 mg PO Q8 [facility]


   Last Admin: 11/29/18 12:52 Dose:  10 mg


Dexamethasone (Decadron Inj)  4 mg IVP Q6 [facility]


   Last Admin: 11/29/18 09:33 Dose:  4 mg


Docusate Sodium (Colace)  100 mg PO BID [facility]


   Last Admin: 11/29/18 08:49 Dose:  100 mg


Cefepime HCl 2 gm/ Sodium (Chloride)  100 mls @ 100 mls/hr IVPB Q8 [facility]; Protocol


   Last Admin: 11/29/18 08:51 Dose:  100 mls/hr


Vancomycin HCl 1 gm/ Sodium (Chloride)  250 mls @ 125 mls/hr IVPB Q12@0100,1300 

[facility]; Protocol


   Last Admin: 11/29/18 12:52 Dose:  125 mls/hr


Lactated Ringer's (Lactated Ringer's)  1,000 mls @ 100 mls/hr IV .Q10H [facility]


   Last Admin: 11/28/18 10:38 Dose:  100 mls/hr


Naloxone HCl (Narcan)  0.1 mg IVP Q2M PRN


   PRN Reason: Shortness of Breath











Physical Exam





- Constitutional


Appears: Non-toxic





- Head Exam


Head Exam: ATRAUMATIC, NORMAL INSPECTION, NORMOCEPHALIC





- Eye Exam


Eye Exam: EOMI





- ENT Exam


ENT Exam: Mucous Membranes Moist





- Respiratory Exam


Respiratory Exam: NORMAL BREATHING PATTERN





- Cardiovascular Exam


Cardiovascular Exam: REGULAR RHYTHM





- GI/Abdominal Exam


GI & Abdominal Exam: absent: Distended, Firm, Guarding





- Rectal Exam


Rectal Exam: Deferred (he was sitting at this time)





- Extremities Exam


Extremities exam: Negative for: calf tenderness, joint swelling, pedal edema





- Neurological Exam


Neurological exam: Alert, CN II-XII Intact, Oriented x3





- Psychiatric Exam


Psychiatric exam: Normal Affect, Normal Mood





- Skin


Skin Exam: Warm





Results





- Vital Signs


Recent Vital Signs: 


                                Last Vital Signs











Temp  97.9 F   11/29/18 08:00


 


Pulse  62   11/29/18 12:00


 


Resp  14   11/29/18 09:00


 


BP  117/64   11/29/18 08:00


 


Pulse Ox  100   11/29/18 09:00














- Labs


Result Diagrams: 


                                 11/29/18 04:40





                                 11/29/18 04:40


Labs: 


                         Laboratory Results - last 24 hr











  11/28/18 11/29/18 11/29/18





  18:30 04:40 04:40


 


WBC   14.6 H 


 


RBC   3.63 L 


 


Hgb   11.2 L 


 


Hct   33.9 L 


 


MCV   93.4 


 


MCH   30.8 


 


MCHC   33.0 


 


RDW   13.1 


 


Plt Count   361 


 


Sodium    134


 


Potassium    4.5


 


Chloride    101


 


Carbon Dioxide    29


 


Anion Gap    9 L


 


BUN    17


 


Creatinine    0.8


 


Est GFR ( Amer)    > 60


 


Est GFR (Non-Af Amer)    > 60


 


Random Glucose    138 H


 


Calcium    9.0


 


Vancomycin Trough  12.4 H  














Assessment & Plan





- Assessment and Plan (Free Text)


Assessment: 





33 year old male with cervical epidural abscess s/p debridement and fixation. 

Post op early recovery is impressive based on the early documentation.


He is an ideal rehab candidate and I hope that each day will provide further 

significant neurological return.


I do not want to give anything for these occasional spasms and no need for pain 

medication at this time.


I strongly recommend [facility] transfer when medically ready.

## 2018-11-29 NOTE — CARD
--------------- APPROVED REPORT --------------





Date of service: 11/29/2018



EXAM: Two-dimensional and M-mode echocardiogram with Doppler and 

color Doppler.



Other Information 

Quality : ExcellentRhythm : NSR



INDICATION

Infection:Subacute bacterial endocarditis 



2D DIMENSIONS 

IVSd1.15   (0.7-1.1cm)LVDd5.39   (3.9-5.9cm)

LVOT Diameter2.35   (1.8-2.4cm)PWd0.85   (0.7-1.1cm)

IVSs1.31   (0.8-1.2cm)LVDs3.65   (2.5-4.0cm)

FS (%) 32.3   %PWs1.52   (0.8-1.2cm)



M-Mode DIMENSIONS 

Left Atrium (MM)3.53   (2.5-4.0cm)IVSd0.50   (0.7-1.1cm)

Aortic Root3.20   (2.2-3.7cm)LVDd6.09   (4.0-5.6cm)

Aortic Cusp Exc.2.59   (1.5-2.0cm)PWd0.83   (0.7-1.1cm)

IVSs1.38   cmFS (%) 36   %

LVDs3.89   (2.0-3.8cm)PWs1.32   cm



Aortic Valve

AoV Peak Hvnmysdk860.7cm/sAoV VTI19.6cmAO Peak GR.4mmHg

LVOT Peak Jzssbckb51.9cm/sLVOT VTI18.50cmAO Mean GR.2mmHg

EAN (VMAX)1.49bq5VEP (VTI)1.95cm2



Mitral Valve

MV E Fchxqcrs41.4cm/sMV DECEL [id number] A Cermxxpn75.7cm/s

MV XAO67klV/A ratio1.7MVA (PHT)2.25cm2



TDI

Lateral E' Peak V16.30cm/sMedial E' Peak V14.67cm/sE/Lateral 

E'3.6

E/Medial E'4.0



Tricuspid Valve

TR Peak Dubhjndn969sv/sRAP ANZYLRXS32vxLnRQ Peak Gr.17mmHg

[id number]



 LEFT VENTRICLE 

The left ventricle is normal size.

There is normal left ventricular wall thickness.

The left ventricular systolic function is normal.

The estimated ejection fraction is 55-60%

No regional wall motion abnormalities noted..

The left ventricular diastolic function is normal.

No left ventricle thrombus noted on this study.

There is no ventricular septal defect visualized.

There is no left ventricular aneurysm.

There is no mass noted in the left ventricle.



 RIGHT VENTRICLE 

The right ventricle is normal size.

There is normal right ventricular wall thickness.

The right ventricular systolic function is normal.



 ATRIA 

The left atrium size is normal.

The right atrium size is normal.

The interatrial septum is intact with no evidence for an atrial 

septal defect.



 AORTIC VALVE 

The aortic valve is normal in structure.

No aortic regurgitation is present.

There is no aortic valvular stenosis.

There is no aortic valvular vegetation.



 MITRAL VALVE 

The mitral valve is normal in structure.

There is no evidence of mitral valve prolapse.

There is no mitral valve stenosis.

There is no mitral valve regurgitation noted.



 TRICUSPID VALVE 

The tricuspid valve is normal in structure.

There is mild tricuspid valve regurgitation noted. RVSP is calculated 

at 26 mm Hg. 

There is no tricuspid valve prolapse or vegetation.

There is no tricuspid valve stenosis.



 PULMONIC VALVE 

The pulmonary valve is normal in structure.

There is no pulmonic valvular regurgitation.

There is no pulmonic valvular stenosis.



 GREAT VESSELS 

The aortic root is normal in size.

The ascending aorta is normal in size.

The pulmonary artery is normal.

The IVC is normal in size and collapses >50% with inspiration.



 PERICARDIAL EFFUSION 

There is no pericardial effusion.

There is no pleural effusion.



<Conclusion>

The estimated ejection fraction is 55-60%

The left ventricular diastolic function is normal.

The left atrium size is normal.

There is mild tricuspid valve regurgitation noted. RVSP is calculated 

at 26 mm Hg.

No valvular vegetations are found on this study. Correlate 

clinically.

## 2018-11-30 ENCOUNTER — HOSPITAL ENCOUNTER (INPATIENT)
Dept: HOSPITAL 14 - H.REHABAC | Age: 33
LOS: 20 days | Discharge: HOME | DRG: 466 | End: 2018-12-20
Attending: INTERNAL MEDICINE | Admitting: INTERNAL MEDICINE
Payer: MEDICAID

## 2018-11-30 VITALS — OXYGEN SATURATION: 97 %

## 2018-11-30 VITALS
RESPIRATION RATE: 16 BRPM | HEART RATE: 65 BPM | DIASTOLIC BLOOD PRESSURE: 62 MMHG | SYSTOLIC BLOOD PRESSURE: 118 MMHG | TEMPERATURE: 98.2 F

## 2018-11-30 VITALS — BODY MASS INDEX: 19.5 KG/M2

## 2018-11-30 DIAGNOSIS — F17.200: ICD-10-CM

## 2018-11-30 DIAGNOSIS — G62.9: ICD-10-CM

## 2018-11-30 DIAGNOSIS — N39.498: ICD-10-CM

## 2018-11-30 DIAGNOSIS — M46.42: ICD-10-CM

## 2018-11-30 DIAGNOSIS — M46.22: ICD-10-CM

## 2018-11-30 DIAGNOSIS — L60.3: ICD-10-CM

## 2018-11-30 DIAGNOSIS — G47.00: ICD-10-CM

## 2018-11-30 DIAGNOSIS — F12.10: ICD-10-CM

## 2018-11-30 DIAGNOSIS — K59.00: ICD-10-CM

## 2018-11-30 DIAGNOSIS — K59.2: ICD-10-CM

## 2018-11-30 DIAGNOSIS — K04.7: ICD-10-CM

## 2018-11-30 DIAGNOSIS — L85.9: ICD-10-CM

## 2018-11-30 DIAGNOSIS — N31.9: ICD-10-CM

## 2018-11-30 DIAGNOSIS — M43.6: ICD-10-CM

## 2018-11-30 DIAGNOSIS — F32.9: ICD-10-CM

## 2018-11-30 DIAGNOSIS — L84: ICD-10-CM

## 2018-11-30 DIAGNOSIS — Z99.3: ICD-10-CM

## 2018-11-30 DIAGNOSIS — D64.9: ICD-10-CM

## 2018-11-30 DIAGNOSIS — Z48.811: Primary | ICD-10-CM

## 2018-11-30 DIAGNOSIS — G82.20: ICD-10-CM

## 2018-11-30 LAB
ALBUMIN SERPL-MCNC: 3.1 G/DL (ref 3.5–5)
ALBUMIN/GLOB SERPL: 0.9 {RATIO} (ref 1–2.1)
ALT SERPL-CCNC: 36 U/L (ref 21–72)
AST SERPL-CCNC: 46 U/L (ref 17–59)
BUN SERPL-MCNC: 15 MG/DL (ref 9–20)
CALCIUM SERPL-MCNC: 9.3 MG/DL (ref 8.4–10.2)
ERYTHROCYTE [DISTWIDTH] IN BLOOD BY AUTOMATED COUNT: 13 % (ref 11.5–14.5)
GFR NON-AFRICAN AMERICAN: > 60
HGB BLD-MCNC: 11.6 G/DL (ref 12–18)
MCH RBC QN AUTO: 30.6 PG (ref 27–31)
MCHC RBC AUTO-ENTMCNC: 32.7 G/DL (ref 33–37)
MCV RBC AUTO: 93.6 FL (ref 80–94)
PLATELET # BLD: 361 K/UL (ref 130–400)
RBC # BLD AUTO: 3.79 MIL/UL (ref 4.4–5.9)
WBC # BLD AUTO: 13 K/UL (ref 4.8–10.8)

## 2018-11-30 PROCEDURE — F07L6GZ THERAPEUTIC EXERCISE TREATMENT OF MUSCULOSKELETAL SYSTEM - LOWER BACK / LOWER EXTREMITY USING AEROBIC ENDURANCE AND CONDITIONING EQUIPMENT: ICD-10-PCS | Performed by: INTERNAL MEDICINE

## 2018-11-30 PROCEDURE — F07L7ZZ MANUAL THERAPY TECHNIQUES TREATMENT OF MUSCULOSKELETAL SYSTEM - LOWER BACK / LOWER EXTREMITY: ICD-10-PCS | Performed by: INTERNAL MEDICINE

## 2018-11-30 PROCEDURE — F07Z8FZ TRANSFER TRAINING TREATMENT USING ASSISTIVE, ADAPTIVE, SUPPORTIVE OR PROTECTIVE EQUIPMENT: ICD-10-PCS | Performed by: INTERNAL MEDICINE

## 2018-11-30 PROCEDURE — F08Z1FZ DRESSING TECHNIQUES TREATMENT USING ASSISTIVE, ADAPTIVE, SUPPORTIVE OR PROTECTIVE EQUIPMENT: ICD-10-PCS | Performed by: INTERNAL MEDICINE

## 2018-11-30 PROCEDURE — F07Z5FZ BED MOBILITY TREATMENT USING ASSISTIVE, ADAPTIVE, SUPPORTIVE OR PROTECTIVE EQUIPMENT: ICD-10-PCS | Performed by: INTERNAL MEDICINE

## 2018-11-30 PROCEDURE — F07Z4FZ WHEELCHAIR MOBILITY TREATMENT USING ASSISTIVE, ADAPTIVE, SUPPORTIVE OR PROTECTIVE EQUIPMENT: ICD-10-PCS | Performed by: INTERNAL MEDICINE

## 2018-11-30 RX ADMIN — DEXAMETHASONE SODIUM PHOSPHATE SCH MG: 4 INJECTION, SOLUTION INTRAMUSCULAR; INTRAVENOUS at 04:40

## 2018-11-30 RX ADMIN — DEXAMETHASONE SODIUM PHOSPHATE SCH MG: 4 INJECTION, SOLUTION INTRAMUSCULAR; INTRAVENOUS at 18:29

## 2018-11-30 RX ADMIN — DEXAMETHASONE SODIUM PHOSPHATE SCH MG: 4 INJECTION, SOLUTION INTRAMUSCULAR; INTRAVENOUS at 09:00

## 2018-11-30 NOTE — CP.PCM.CON
History of Present Illness





- History of Present Illness


History of Present Illness: 





Dr Morales PMR consultation on Fausto Ken, born 1985 who has been admitted 

to [facility] for acute onset of LE>UE weakness and urinary incont.  Noted to have a 

cervical epidural abscess and underwent urgent surgery and has already had a 

very good LE recovery.  Still with catheter for urinary issue, not sure if it 

was overflow incontinence.  Seen by ID and on ABX treatment.  Having some spasms

but not constant spasticity and no real pain.  He has come up from the ICU to 

the [facility] acute rehab floor 





Review of Systems





- Constitutional


Constitutional: absent: Chills, Daytime Sleepiness





- EENT


Eyes: absent: Blurred Vision, Change in Vision


Ears: absent: Ear Discharge, Ear Pain, Dizziness


Nose/Mouth/Throat: absent: Nasal Congestion, Nasal Discharge





- Cardiovascular


Cardiovascular: absent: Chest Pain





- Respiratory


Respiratory: absent: Cough, Hemoptysis





- Gastrointestinal


Gastrointestinal: Constipation (likely neurogenic bowel).  absent: Belching





- Genitourinary


Genitourinary: Other (+ easton)





- Musculoskeletal


Musculoskeletal: absent: Back Pain





- Integumentary


Integumentary: absent: Bleeding Lesions





- Neurological


Neurological: Abnormal Movements (goes into spasms typically when attempting 

weight bearing).  absent: Abnormal Hearing, Behavioral Changes





- Psychiatric


Psychiatric: absent: Anxiety





Past Patient History





- Past Medical History & Family History


Past Medical History?: Yes





- Past Social History


Smoking Status: Current Some Days Smoker


Alcohol: < 2 Drinks/Day


Drugs: Cannabis


Home Situation {Lives}: With Family





- CARDIAC


Hx Cardiac Disorders: No





- PULMONARY


Hx Respiratory Disorders: No





- HEMATOLOGICAL/ONCOLOGICAL


Hx AIDS: No


Hx Human Immunodeficiency Virus (HIV): No





- MUSCULOSKELETAL/RHEUMATOLOGICAL


Hx Falls: Yes (few days ago)





- PSYCHIATRIC


Hx Substance Use: Yes (claimed smoke Marijuana)





- SURGICAL HISTORY


Hx Surgeries: Yes


Other/Comment: hernia surgery





- ANESTHESIA


Hx Anesthesia: Yes


Hx Anesthesia Reactions: No


Hx Malignant Hyperthermia: No





Meds


Allergies/Adverse Reactions: 


                                    Allergies











Allergy/AdvReac Type Severity Reaction Status Date / Time


 


No Known Allergies Allergy   Verified 11/30/18 15:54














- Medications


Medications: 


                               Current Medications





Dexamethasone (Decadron Inj)  4 mg IVP Q6 [facility]


Docusate Sodium (Colace)  100 mg PO BID [facility]


Hydromorphone HCl (Dilaudid)  1 mg IVP Q6 PRN


   PRN Reason: Pain, moderate (4-7)


Cefepime HCl 2 gm/ Sodium (Chloride)  100 mls @ 100 mls/hr IVPB 

Q8@0600,1400,2200 [facility]


Vancomycin HCl 1 gm/ Sodium (Chloride)  250 mls @ 166.667 mls/hr IVPB Q12H [facility]


Naloxone HCl (Narcan 2mg/2ml)  1 mg IVP Q2M PRN


   PRN Reason: Shortness of Breath











Physical Exam





- Constitutional


Appears: Non-toxic, No Acute Distress





- Head Exam


Head Exam: ATRAUMATIC, NORMAL INSPECTION, NORMOCEPHALIC





- Eye Exam


Eye Exam: EOMI





- ENT Exam


ENT Exam: Mucous Membranes Moist





- Respiratory Exam


Respiratory Exam: NORMAL BREATHING PATTERN





- Cardiovascular Exam


Cardiovascular Exam: REGULAR RHYTHM





- GI/Abdominal Exam


GI & Abdominal Exam: absent: Distended





- Extremities Exam


Extremities exam: Negative for: calf tenderness, pedal edema





- Neurological Exam


Neurological exam: Alert, CN II-XII Intact, Oriented x3





- Psychiatric Exam


Psychiatric exam: Normal Affect, Normal Mood





- Skin


Skin Exam: Warm





Results





- Vital Signs


Recent Vital Signs: 


                                Last Vital Signs











Temp      


 


Pulse      


 


Resp  20   11/30/18 15:58


 


BP      


 


Pulse Ox  98   11/30/18 15:58














Assessment & Plan





- Assessment and Plan (Free Text)


Assessment: 





33 year old with SCI


neurogenic bowel and bladder


he is able to activate his gluteal muscles more than his sphincter


he has sensation but reduced to light touch. 


I did not have time to do a complete RAZA examination at this time.


will get a trapeze


consider d/c easton trial monday depending on examination.


pain is controlled


will give a dulcolax supp in the -am to have a BM





Patient is an excellent acute rehabilitation candidate and will have focused 

SCI, pain management, wound care, PT, OT and recreational therapy to help 

facilitate a safe and appropriate d/c plan





Impairment code:  04.111

## 2018-11-30 NOTE — CP.PCM.PN
Subjective





- Date & Time of Evaluation


Date of Evaluation: 11/30/18


Time of Evaluation: 07:00





- Subjective


Subjective: 





neuro status improving


growing Acinetobacter from wound- source unclear 


May need to r/o Perforated Foramen Ovale  - consider cardio eval  ? 

contraindication for ASHLEY 





Objective





- Vital Signs/Intake and Output


Vital Signs (last 24 hours): 


                                        











Temp Pulse Resp BP Pulse Ox


 


 97.9 F   50 L  12   131/62   97 


 


 11/30/18 09:00  11/30/18 04:00  11/30/18 04:00  11/30/18 04:00  11/30/18 04:00








Intake and Output: 


                                        











 11/30/18 11/30/18





 06:59 18:59


 


Intake Total 830 286


 


Output Total 1200 700


 


Balance -370 -414














- Medications


Medications: 


                               Current Medications





Dexamethasone (Decadron Inj)  4 mg IVP Q6 [facility]


   Last Admin: 11/30/18 09:00 Dose:  4 mg


Docusate Sodium (Colace)  100 mg PO BID [facility]


   Last Admin: 11/30/18 08:57 Dose:  100 mg


Hydromorphone HCl (Dilaudid)  1 mg IVP Q6 PRN


   PRN Reason: Pain, moderate (4-7)


   Last Admin: 11/30/18 11:24 Dose:  1 mg


Cefepime HCl 2 gm/ Sodium (Chloride)  100 mls @ 100 mls/hr IVPB Q8 [facility]; Protocol


   Last Admin: 11/30/18 08:57 Dose:  100 mls/hr


Vancomycin HCl 1 gm/ Sodium (Chloride)  250 mls @ 125 mls/hr IVPB Q12@0100,1300 

[facility]; Protocol


   Last Admin: 11/30/18 12:32 Dose:  125 mls/hr


Lactated Ringer's (Lactated Ringer's)  1,000 mls @ 100 mls/hr IV .Q10H [facility]


   Last Admin: 11/28/18 10:38 Dose:  100 mls/hr


Naloxone HCl (Narcan)  0.1 mg IVP Q2M PRN


   PRN Reason: Shortness of Breath











- Labs


Labs: 


                                        





                                 11/30/18 04:45 





                                 11/30/18 04:45 





                                        











PT  14.1 Seconds (9.8-13.1)  H  11/26/18  10:28    


 


INR  1.2   11/26/18  10:28    


 


APTT  26.1 Seconds (25.6-37.1)   11/26/18  10:28    














- Constitutional


Appears: Non-toxic, Chronically Ill





- Head Exam


Head Exam: NORMOCEPHALIC





- Eye Exam


Eye Exam: absent: Scleral icterus





- ENT Exam


ENT Exam: Mucous Membranes Dry





- Neck Exam


Neck Exam: absent: Lymphadenopathy





- Respiratory Exam


Respiratory Exam: Decreased Breath Sounds





- Cardiovascular Exam


Cardiovascular Exam: REGULAR RHYTHM





- GI/Abdominal Exam


GI & Abdominal Exam: Distended, Soft





- Rectal Exam


Rectal Exam: Deferred





- Extremities Exam


Extremities Exam: absent: Pedal Edema





- Back Exam


Back Exam: absent: CVA tenderness (L), CVA tenderness (R)





Assessment and Plan


(1) Discitis of cervical region


Status: Acute   





(2) Epidural abscess


Status: Acute   





(3) Osteomyelitis of cervical spine


Status: Acute   





- Assessment and Plan (Free Text)


Assessment: 





neuro status improving


growing Acinetobacter from wound- source unclear as all blood cultures negative 

-  consider CT abd / pelvis 


May need to r/o Perforated Foramen Ovale  - consider cardio eval  ? contraindica

tion for ASHLEY

## 2018-11-30 NOTE — CP.PCM.PN
Subjective





- Date & Time of Evaluation


Date of Evaluation: 11/30/18


Time of Evaluation: 16:17





- Subjective


Subjective: 





Neurology Consultation Follow-Up Note:





Mr. Ken was seen this afternoon in acute rehab.  He is POD#3  C4-T1 

laminectomy and debridement of epidural abscess with neurosurgery.  Today he is 

in great spirits, he is smiling, laughing, and generally looks happy.  He states

that he is feeling much better and feels great considering recent events. He 

admits that his neck pain is mild and it is improving.  Today he denies 

headache, dizziness, visual changes, chest pain, shortness of breath, 

nausea/vomiting, paresthesias.  He participated in PT today.  No acute events 

overnight per nursing. 





Objective





- Vital Signs/Intake and Output


Vital Signs (last 24 hours): 


                                        











Temp Pulse Resp BP Pulse Ox


 


 98.2 F   65   16   118/62   97 


 


 11/30/18 14:00  11/30/18 14:00  11/30/18 14:00  11/30/18 14:00  11/30/18 04:00








Intake and Output: 


                                        











 11/30/18 11/30/18





 06:59 18:59


 


Intake Total 830 616


 


Output Total 1200 830


 


Balance -370 -214














- Medications


Medications: 


                               Current Medications





Dexamethasone (Decadron Inj)  4 mg IVP Q6 [facility]


   Last Admin: 11/30/18 09:00 Dose:  4 mg


Docusate Sodium (Colace)  100 mg PO BID [facility]


   Last Admin: 11/30/18 08:57 Dose:  100 mg


Hydromorphone HCl (Dilaudid)  1 mg IVP Q6 PRN


   PRN Reason: Pain, moderate (4-7)


   Last Admin: 11/30/18 11:24 Dose:  1 mg


Cefepime HCl 2 gm/ Sodium (Chloride)  100 mls @ 100 mls/hr IVPB Q8 [facility]; Protocol


   Last Admin: 11/30/18 08:57 Dose:  100 mls/hr


Vancomycin HCl 1 gm/ Sodium (Chloride)  250 mls @ 125 mls/hr IVPB Q12@0100,1300 

[facility]; Protocol


   Last Admin: 11/30/18 12:32 Dose:  125 mls/hr


Lactated Ringer's (Lactated Ringer's)  1,000 mls @ 100 mls/hr IV .Q10H [facility]


   Last Admin: 11/28/18 10:38 Dose:  100 mls/hr


Naloxone HCl (Narcan)  0.1 mg IVP Q2M PRN


   PRN Reason: Shortness of Breath











- Labs


Labs: 


                                        





                                 11/30/18 04:45 





                                 11/30/18 04:45 





                                        











PT  14.1 Seconds (9.8-13.1)  H  11/26/18  10:28    


 


INR  1.2   11/26/18  10:28    


 


APTT  26.1 Seconds (25.6-37.1)   11/26/18  10:28    














- Constitutional


Appears: Well, Non-toxic, No Acute Distress





- Head Exam


Head Exam: ATRAUMATIC, NORMAL INSPECTION, NORMOCEPHALIC





- Eye Exam


Eye Exam: EOMI, Normal appearance, PERRL


Pupil Exam: NORMAL ACCOMODATION, PERRL





- ENT Exam


ENT Exam: Mucous Membranes Moist





- Neck Exam


Additional comments: 





[place] J- Collar on; limited movement.





- Respiratory Exam


Respiratory Exam: NORMAL BREATHING PATTERN





- Extremities Exam


Extremities Exam: Normal Inspection.  absent: Calf Tenderness, Pedal Edema


Additional comments: 





Decreased ROM to BLE; FROM BUE





- Neurological Exam


Neurological Exam: Alert, Awake, CN II-XII Intact, Oriented x3


Neuro motor strength exam: Left Upper Extremity: 5, Right Upper Extremity: 5, 

Left Lower Extremity: 5 (see additional comments), Right Lower Extremity: 5 (see

additional comments)


Additional comments: 


Speech is clear, fluid.


Sensation intact and equal bilaterally, however, still slightly diminished to 

both LE compared to UE and face.


Able to raise LLE and RLE independently and against resistance 4/5.  Bilateral 

quad strength 5/5. Dorsiflexion bilaterally 5/5. Anterior tibial still  3/5 

against resistance bilaterally. 


 b/l 4/5.


Overall  huge improvement compared to day of admission. 


Reflexes brisk b/l.


Gait not assessed due to patient's condition, however, PT notes reviewed.





- Psychiatric Exam


Psychiatric exam: Normal Affect, Normal Mood





- Skin


Skin Exam: Normal Color





Assessment and Plan


(1) Epidural abscess


Assessment & Plan: 


Imaging:





-MRI C-Spine (11/26/18): 1. C6-7 intervertebral disc fluid with edema at C6 and 

C7 vertebral bodies related to the mid to posterior endplate distribution 

suspicious for discitis osteomyelitis. Epidural fluid is identified more po

steriorly than anteriorly from at least C4-T1 levels with cord compressed at C6 

and C7. Contrast MRI cervical spine can be utilized for added characterization. 

 2. No definitive disc herniation appreciable though an annular tear is not 

excluded at the C6-7 disc posteriorly.   3. Mild reversal cervical curvature. 

Definitive fracture or spondylolisthesis appreciable. 


-MRI L-Spine (11/26/18): 1. Small right paracentral disc protrusion L5-S1 

without significant stenosis resulting.  2. Minimal disc bulge L4-5 without 

stenosis.  3. Widely patent central canal and bilateral neural foramina througho

ut.


-MRI T-Spine (11/26/18): Unremarkable non-contrast enhanced MRI of the thoracic 

spine.  Incidental edema at C7 vertebral body is detailed in separate cervical 

spine MRI also performed 11/26/2018.  Please see separate report.





-Mr. Ken is doing well considering his condition upon admission.  He is POD#3

 C4-T1 laminectomy and debridement of epidural abscess with neurosurgery.


-Maintain [place] J-Collar per neurosurgery


-Continue PT/OT in acute rehab. 


-Fall precautions.


-Please notify neuro team of any acute changes in patient's condition


-We will continue to follow the patient.


 





Case discussed with Dr. Anderson.


Thank you for allowing us to participate in this patient's care.


Status: Acute

## 2018-11-30 NOTE — CP.PCM.PN
Subjective





- Date & Time of Evaluation


Date of Evaluation: 11/30/18


Time of Evaluation: 13:17





- Subjective


Subjective: 





CLINICALLY IMPROVING


FAIR RANGE OF MOTION OVER EXTREMITIES


WOUND CULTURE=ACINETOBACTER


WILL CONTINUE ANTIBIOTIC TX


FOR TRANSFER TO ACUTE REHAB





Objective





- Vital Signs/Intake and Output


Vital Signs (last 24 hours): 


                                        











Temp Pulse Resp BP Pulse Ox


 


 97.9 F   50 L  12   131/62   97 


 


 11/30/18 09:00  11/30/18 04:00  11/30/18 04:00  11/30/18 04:00  11/30/18 04:00








Intake and Output: 


                                        











 11/30/18 11/30/18





 06:59 18:59


 


Intake Total 830 286


 


Output Total 1200 700


 


Balance -370 -414














- Medications


Medications: 


                               Current Medications





Dexamethasone (Decadron Inj)  4 mg IVP Q6 [facility]


   Last Admin: 11/30/18 09:00 Dose:  4 mg


Docusate Sodium (Colace)  100 mg PO BID [facility]


   Last Admin: 11/30/18 08:57 Dose:  100 mg


Hydromorphone HCl (Dilaudid)  1 mg IVP Q6 PRN


   PRN Reason: Pain, moderate (4-7)


   Last Admin: 11/30/18 11:24 Dose:  1 mg


Cefepime HCl 2 gm/ Sodium (Chloride)  100 mls @ 100 mls/hr IVPB Q8 [facility]; Protocol


   Last Admin: 11/30/18 08:57 Dose:  100 mls/hr


Vancomycin HCl 1 gm/ Sodium (Chloride)  250 mls @ 125 mls/hr IVPB Q12@0100,1300 

[facility]; Protocol


   Last Admin: 11/30/18 12:32 Dose:  125 mls/hr


Lactated Ringer's (Lactated Ringer's)  1,000 mls @ 100 mls/hr IV .Q10H [facility]


   Last Admin: 11/28/18 10:38 Dose:  100 mls/hr


Naloxone HCl (Narcan)  0.1 mg IVP Q2M PRN


   PRN Reason: Shortness of Breath











- Labs


Labs: 


                                        





                                 11/30/18 04:45 





                                 11/30/18 04:45 





                                        











PT  14.1 Seconds (9.8-13.1)  H  11/26/18  10:28    


 


INR  1.2   11/26/18  10:28    


 


APTT  26.1 Seconds (25.6-37.1)   11/26/18  10:28

## 2018-11-30 NOTE — CP.PCM.PN
Subjective





- Date & Time of Evaluation


Date of Evaluation: 11/30/18


Time of Evaluation: 12:43





- Subjective


Subjective: 





POD 3


doing well


breaks gravity throughout BLEs ex DF


excellent proprioception bilat





agree with trans to rehab asap





Objective





- Vital Signs/Intake and Output


Vital Signs (last 24 hours): 


                                        











Temp Pulse Resp BP Pulse Ox


 


 97.9 F   50 L  12   131/62   97 


 


 11/30/18 09:00  11/30/18 04:00  11/30/18 04:00  11/30/18 04:00  11/30/18 04:00








Intake and Output: 


                                        











 11/30/18 11/30/18





 06:59 18:59


 


Intake Total 830 286


 


Output Total 1200 700


 


Balance -370 -414














- Medications


Medications: 


                               Current Medications





Dexamethasone (Decadron Inj)  4 mg IVP Q6 SERENA


   Last Admin: 11/30/18 09:00 Dose:  4 mg


Docusate Sodium (Colace)  100 mg PO BID SERENA


   Last Admin: 11/30/18 08:57 Dose:  100 mg


Hydromorphone HCl (Dilaudid)  1 mg IVP Q6 PRN


   PRN Reason: Pain, moderate (4-7)


   Last Admin: 11/30/18 11:24 Dose:  1 mg


Cefepime HCl 2 gm/ Sodium (Chloride)  100 mls @ 100 mls/hr IVPB Q8 SERENA; Protocol


   Last Admin: 11/30/18 08:57 Dose:  100 mls/hr


Vancomycin HCl 1 gm/ Sodium (Chloride)  250 mls @ 125 mls/hr IVPB Q12@0100,1300 

SERENA; Protocol


   Last Admin: 11/30/18 12:32 Dose:  125 mls/hr


Lactated Ringer's (Lactated Ringer's)  1,000 mls @ 100 mls/hr IV .Q10H SERENA


   Last Admin: 11/28/18 10:38 Dose:  100 mls/hr


Naloxone HCl (Narcan)  0.1 mg IVP Q2M PRN


   PRN Reason: Shortness of Breath











- Labs


Labs: 


                                        





                                 11/30/18 04:45 





                                 11/30/18 04:45 





                                        











PT  14.1 Seconds (9.8-13.1)  H  11/26/18  10:28    


 


INR  1.2   11/26/18  10:28    


 


APTT  26.1 Seconds (25.6-37.1)   11/26/18  10:28

## 2018-11-30 NOTE — RAD
Date of service: 



11/27/2018



PROCEDURE:  Fluoroscopy up to 1 hr.



HISTORY:

Discitis/osteomyelitis lower cervical spine 



COMPARISON:

None



TECHNIQUE:

Standard protocol for this study/examination.



FINDINGS:

 Total fluoroscopic time (continuous mode) utilized during the 

procedure 9.3 seconds. 



Total exam DLP: 0.65 (mGy).



IMPRESSION:

Less than 1 hr fluoroscopic assistance provided during performance of 

the procedure.

## 2018-11-30 NOTE — PCM.OPOC
Physiatry Overall Plan of Care





- Overall Plan of Care


Estimated Length of Stay in Weeks: 6


Rehab Impairment: Mobility, Gait, Coordination


Etiologic Diagnosis: Other (SCI)





- Anticipated Interventions


Physical Therapy:: Yes


Occupational Therapy:: Yes


Speech Therapy:: No


Recreational Therapy:: Yes





- Therapy Goals


Bed Mobility: Supervision


Ambulation: Contact Guard





- Discharge Plan


Discharge Destination: Home

## 2018-12-01 LAB
BUN SERPL-MCNC: 16 MG/DL (ref 9–20)
CALCIUM SERPL-MCNC: 9.2 MG/DL (ref 8.4–10.2)
ERYTHROCYTE [DISTWIDTH] IN BLOOD BY AUTOMATED COUNT: 13 % (ref 11.5–14.5)
GFR NON-AFRICAN AMERICAN: > 60
HGB BLD-MCNC: 11.6 G/DL (ref 12–18)
MCH RBC QN AUTO: 30.2 PG (ref 27–31)
MCHC RBC AUTO-ENTMCNC: 32.3 G/DL (ref 33–37)
MCV RBC AUTO: 93.8 FL (ref 80–94)
PLATELET # BLD: 360 K/UL (ref 130–400)
RBC # BLD AUTO: 3.84 MIL/UL (ref 4.4–5.9)
WBC # BLD AUTO: 11.9 K/UL (ref 4.8–10.8)

## 2018-12-01 RX ADMIN — DEXAMETHASONE SODIUM PHOSPHATE SCH MG: 4 INJECTION, SOLUTION INTRAMUSCULAR; INTRAVENOUS at 13:55

## 2018-12-01 RX ADMIN — DEXAMETHASONE SODIUM PHOSPHATE SCH MG: 4 INJECTION, SOLUTION INTRAMUSCULAR; INTRAVENOUS at 18:15

## 2018-12-01 RX ADMIN — Medication PRN MG: at 16:54

## 2018-12-01 RX ADMIN — DEXAMETHASONE SODIUM PHOSPHATE SCH MG: 4 INJECTION, SOLUTION INTRAMUSCULAR; INTRAVENOUS at 23:09

## 2018-12-01 RX ADMIN — DEXAMETHASONE SODIUM PHOSPHATE SCH MG: 4 INJECTION, SOLUTION INTRAMUSCULAR; INTRAVENOUS at 00:02

## 2018-12-01 RX ADMIN — DEXAMETHASONE SODIUM PHOSPHATE SCH MG: 4 INJECTION, SOLUTION INTRAMUSCULAR; INTRAVENOUS at 06:25

## 2018-12-01 NOTE — CP.PCM.HP
History of Present Illness





- History of Present Illness


History of Present Illness: 





33 yr old male admitted from the ic[facility] following therapy for epidural abscess--s/p

neurosurgery intervention.He had paralysis of lower extremities which has 

improved with surgery.





Present on Admission





- Present on Admission


Any Indicators Present on Admission: Yes





Past Patient History





- Past Medical History & Family History


Past Medical History?: Yes





- Past Social History


Smoking Status: Current Some Days Smoker


Alcohol: < 2 Drinks/Day


Drugs: Cannabis


Home Situation {Lives}: With Family





- CARDIAC


Hx Cardiac Disorders: No





- PULMONARY


Hx Respiratory Disorders: No





- HEMATOLOGICAL/ONCOLOGICAL


Hx AIDS: No


Hx Human Immunodeficiency Virus (HIV): No





- MUSCULOSKELETAL/RHEUMATOLOGICAL


Hx Falls: Yes (few days ago)





- PSYCHIATRIC


Hx Substance Use: Yes (claimed smoke Marijuana)





- SURGICAL HISTORY


Hx Surgeries: Yes


Other/Comment: hernia surgery





- ANESTHESIA


Hx Anesthesia: Yes


Hx Anesthesia Reactions: No


Hx Malignant Hyperthermia: No





Meds


Allergies/Adverse Reactions: 


                                    Allergies











Allergy/AdvReac Type Severity Reaction Status Date / Time


 


No Known Allergies Allergy   Verified 11/30/18 15:54














Physical Exam





- Constitutional


Appears: No Acute Distress





- Head Exam


Head Exam: ATRAUMATIC, NORMAL INSPECTION, NORMOCEPHALIC





- Eye Exam


Eye Exam: EOMI, Normal appearance, PERRL


Pupil Exam: NORMAL ACCOMODATION, PERRL





- ENT Exam


ENT Exam: Mucous Membranes Moist, Normal Exam





- Neck Exam


Neck exam: Positive for: Normal Inspection


Additional comments: 





brace in place





- Respiratory Exam


Respiratory Exam: Clear to Auscultation Bilateral, NORMAL BREATHING PATTERN





- Cardiovascular Exam


Cardiovascular Exam: REGULAR RHYTHM





- GI/Abdominal Exam


GI & Abdominal Exam: Normal Bowel Sounds, Soft.  absent: Tenderness





- Rectal Exam


Rectal Exam: NORMAL INSPECTION





- Extremities Exam


Extremities exam: Positive for: normal inspection





- Back Exam


Back exam: NORMAL INSPECTION





- Neurological Exam


Neurological exam: Alert, CN II-XII Intact, Oriented x3, Reflexes Normal


Additional comments: 





moves all extremities against gravity





- Psychiatric Exam


Psychiatric exam: Normal Affect, Normal Mood





- Skin


Skin Exam: Dry, Intact, Normal Color, Warm





Results





- Vital Signs


Recent Vital Signs: 





                                Last Vital Signs











Temp  98 F   12/01/18 09:00


 


Pulse  89   12/01/18 09:00


 


Resp  20   12/01/18 09:00


 


BP  120/80   12/01/18 09:00


 


Pulse Ox  98   12/01/18 09:00














- Labs


Result Diagrams: 


                                 12/01/18 06:30





                                 12/01/18 06:30


Labs: 





                         Laboratory Results - last 24 hr











  12/01/18 12/01/18 12/01/18





  06:30 06:30 08:00


 


WBC  11.9 H  


 


RBC  3.84 L  


 


Hgb  11.6 L  


 


Hct  36.1  


 


MCV  93.8  


 


MCH  30.2  


 


MCHC  32.3 L  


 


RDW  13.0  


 


Plt Count  360  


 


Sodium   137 


 


Potassium   4.2 


 


Chloride   100 


 


Carbon Dioxide   31 H 


 


Anion Gap   10 


 


BUN   16 


 


Creatinine   0.7 L 


 


Est GFR ( Amer)   > 60 


 


Est GFR (Non-Af Amer)   > 60 


 


Random Glucose   109 


 


Calcium   9.2 


 


Vancomycin Trough    8.3














Assessment & Plan





- Assessment and Plan (Free Text)


Assessment: 





discitis


epidural abscess--s/p neurosurgical intervention


paraplegia improving


iv antibiotics


Plan: 





agressive pt/ot





- Date & Time


Date: 12/01/18


Time: 12:31

## 2018-12-02 RX ADMIN — DEXAMETHASONE SODIUM PHOSPHATE SCH MG: 4 INJECTION, SOLUTION INTRAMUSCULAR; INTRAVENOUS at 05:16

## 2018-12-02 RX ADMIN — Medication PRN MG: at 04:57

## 2018-12-02 RX ADMIN — DEXAMETHASONE SODIUM PHOSPHATE SCH MG: 4 INJECTION, SOLUTION INTRAMUSCULAR; INTRAVENOUS at 23:53

## 2018-12-02 RX ADMIN — DEXAMETHASONE SODIUM PHOSPHATE SCH MG: 4 INJECTION, SOLUTION INTRAMUSCULAR; INTRAVENOUS at 12:05

## 2018-12-02 RX ADMIN — DEXAMETHASONE SODIUM PHOSPHATE SCH MG: 4 INJECTION, SOLUTION INTRAMUSCULAR; INTRAVENOUS at 17:37

## 2018-12-02 NOTE — CP.PCM.CON
History of Present Illness





- History of Present Illness


History of Present Illness: 





  33 yr old male, well known to our service, who was initially admitted to [facility] 

with a several day onset of neck pain with progressive weakness and inability to

walk. MRI showed epidural abcess, which was subsequently evacuated by neur

osurgery Dr. Monzon, and he was in the ICU for several days. He recovered 

strength well and is now in rehab for further management. 





PMH/PSH: as above, no hiv or hepatitits. 


FH/SH: no tobacco, no etoh. 


ALl: nkda. 





ON exam: 


AAOx3. PERRL. CN 2-12normal. NO facial asymmetry. 








Past Patient History





- Past Medical History & Family History


Past Medical History?: Yes





- Past Social History


Smoking Status: Current Some Days Smoker


Alcohol: < 2 Drinks/Day


Drugs: Cannabis


Home Situation {Lives}: With Family





- CARDIAC


Hx Cardiac Disorders: No





- PULMONARY


Hx Respiratory Disorders: No





- HEMATOLOGICAL/ONCOLOGICAL


Hx AIDS: No


Hx Human Immunodeficiency Virus (HIV): No





- MUSCULOSKELETAL/RHEUMATOLOGICAL


Hx Falls: Yes (few days ago)





- PSYCHIATRIC


Hx Substance Use: Yes (claimed smoke Marijuana)





- SURGICAL HISTORY


Hx Surgeries: Yes


Other/Comment: hernia surgery





- ANESTHESIA


Hx Anesthesia: Yes


Hx Anesthesia Reactions: No


Hx Malignant Hyperthermia: No





Meds


Allergies/Adverse Reactions: 


                                    Allergies











Allergy/AdvReac Type Severity Reaction Status Date / Time


 


No Known Allergies Allergy   Verified 11/30/18 15:54














- Medications


Medications: 


                               Current Medications





Acetaminophen (Tylenol 325mg Tab)  650 mg PO Q6 PRN


   PRN Reason: for pain scale  3-4 


Baclofen (Lioresal)  5 mg PO TID [facility]


   Last Admin: 12/02/18 17:21 Dose:  5 mg


Dexamethasone (Decadron Inj)  4 mg IVP Q6 SERENA


   Last Admin: 12/02/18 17:37 Dose:  4 mg


Docusate Sodium (Colace)  100 mg PO BID [facility]


   Last Admin: 12/02/18 17:20 Dose:  100 mg


Cefepime HCl 2 gm/ Sodium (Chloride)  100 mls @ 100 mls/hr IVPB Q8@060

0,1400,2200 [facility]


   Last Admin: 12/02/18 21:06 Dose:  100 mls/hr


Vancomycin HCl 1 gm/ Sodium (Chloride)  250 mls @ 166.667 mls/hr IVPB Q12H [facility]


   Last Admin: 12/02/18 12:14 Dose:  166.667 mls/hr


Lactulose (Enulose)  20 gm PO DAILY PRN


   PRN Reason: Constipation


   Last Admin: 12/02/18 13:29 Dose:  20 gm


Oxycodone HCl (Oxycodone Immediate Release Tab)  5 mg PO Q6 PRN


   PRN Reason: pain 5-10/10


   Last Admin: 12/02/18 04:57 Dose:  5 mg











Results





- Vital Signs


Recent Vital Signs: 


                                Last Vital Signs











Temp  98.6 F   12/02/18 19:52


 


Pulse  52 L  12/02/18 19:52


 


Resp  16   12/02/18 19:52


 


BP  123/65   12/02/18 19:52


 


Pulse Ox  97   12/02/18 19:52














- Labs


Result Diagrams: 


                                 12/03/18 05:35





                                 12/03/18 05:35





Assessment & Plan





- Assessment and Plan (Free Text)


Assessment: 





33 yr old male who is s/p epidural abcess removal, now stable and in rehab. 





Plan:


1. Rehab as per protocol. 


2. Neurontin 300 mg po daily for neuropathic pain. 


Our team will follow


Thank you


Dr. Anderson

## 2018-12-02 NOTE — CP.PCM.DIS
Provider





- Provider


Date of Admission: 


11/26/18 14:30





Attending physician: 


Ashish Nieto MD





Consults: 








11/26/18 14:50


Infectious Disease Consult Stat 


   Comment: 


   Consulting Provider: Artie Esqueda


   Consulting Physician: Artie Esqueda


   Reason for Consult: Epidural abscess


Neuro Surgery Consult Stat 


   Comment: 


   Consulting Provider: Alexis Monzon


   Consulting Physician: Alexis Monzon


   Reason for Consult: Epidural abscess


Neurology Consult Stat 


   Comment: 


   Consulting Provider: Festus Anderson


   Consulting Physician: Festus Anderson


   Reason for Consult: Epidural abscess





11/27/18 09:00


Nursing Referral for Wound Care Routine 


   Comment: 


   Physician Instructions: 


   Reason For Exam: nsg screen


Pastoral Care Referral Routine 


   Comment: 


   Physician Instructions: 


   Reason For Exam: nsg screen


Social Work Referral Routine 


   Comment: advance directive


   Physician Instructions: 


   Reason For Exam: nsg screen





11/27/18 10:00


Nursing Referral for Palliative Care Routine 


   Comment: 


   Consulting Provider: Kendra Petersen


   Physician Instructions: 


   Reason For Exam: nsg screen





11/29/18 10:08


Physiatry Consult Routine 


   Comment: 


   Consulting Provider: Yuri Morales


   Consulting Physician: Yuri Morales


   Reason for Consult: s/p Cervical Lami C4-C7











Time Spent in preparation of Discharge (in minutes): 30





Diagnosis





- Discharge Diagnosis


(1) Back pain


Status: Acute   





(2) Discitis of cervical region


Status: Acute   





(3) Epidural abscess


Status: Acute   





(4) Osteomyelitis of cervical spine


Status: Acute   





(5) Torticollis


Status: Acute   





Hospital Course





- Lab Results


Lab Results: 


                                  Micro Results





11/26/18 14:00   Blood   Blood Culture - Final


                            NO GROWTH AFTER 5 DAYS


11/26/18 14:00   Blood   Gram Stain - Final


                            TEST NOT PERFORMED


11/26/18 14:00   Blood   Blood Culture - Final


                            NO GROWTH AFTER 5 DAYS


11/26/18 14:00   Blood   Gram Stain - Final


                            TEST NOT PERFORMED


11/28/18 14:00   Other: Please Indicate   Gram Stain - Final


11/28/18 14:00   Other: Please Indicate   Wound Culture - Final


                            Acinetobacter Baumannii


11/26/18 17:37   Naris   MRSA Culture (Admit) - Final


                            MRSA NOT DETECTED





                             Most Recent Lab Values











WBC  13.0 K/uL (4.8-10.8)  H  11/30/18  04:45    


 


RBC  3.79 Mil/uL (4.40-5.90)  L  11/30/18  04:45    


 


Hgb  11.6 g/dL (12.0-18.0)  L  11/30/18  04:45    


 


Hct  35.4 % (35.0-51.0)   11/30/18  04:45    


 


MCV  93.6 fl (80.0-94.0)   11/30/18  04:45    


 


MCH  30.6 pg (27.0-31.0)   11/30/18  04:45    


 


MCHC  32.7 g/dL (33.0-37.0)  L  11/30/18  04:45    


 


RDW  13.0 % (11.5-14.5)   11/30/18  04:45    


 


Plt Count  361 K/uL (130-400)   11/30/18  04:45    


 


MPV  7.8 fl (7.2-11.7)   11/28/18  04:35    


 


Neut % (Auto)  90.7 % (50.0-75.0)  H  11/28/18  04:35    


 


Lymph % (Auto)  3.2 % (20.0-40.0)  L  11/28/18  04:35    


 


Mono % (Auto)  6.0 % (0.0-10.0)   11/28/18  04:35    


 


Eos % (Auto)  0.0 % (0.0-4.0)   11/28/18  04:35    


 


Baso % (Auto)  0.1 % (0.0-2.0)   11/28/18  04:35    


 


Neut # (Auto)  16.0 K/uL (1.8-7.0)  H  11/28/18  04:35    


 


Lymph # (Auto)  0.6 K/uL (1.0-4.3)  L  11/28/18  04:35    


 


Mono # (Auto)  1.1 K/uL (0.0-0.8)  H  11/28/18  04:35    


 


Eos # (Auto)  0.0 K/uL (0.0-0.7)   11/28/18  04:35    


 


Baso # (Auto)  0.0 K/uL (0.0-0.2)   11/28/18  04:35    


 


Neutrophils % (Manual)  90 % (42-75)  H  11/28/18  04:35    


 


Lymphocytes % (Manual)  5 % (20-50)  L  11/28/18  04:35    


 


Monocytes % (Manual)  5 % (0-10)   11/28/18  04:35    


 


Platelet Estimate  Normal  (NORMAL)   11/28/18  04:35    


 


Hypochromasia (manual)  Slight   11/28/18  04:35    


 


Tear Drop Cells  Slight   11/26/18  10:28    


 


Ovalocytes  Slight   11/26/18  10:28    


 


ESR  61 mm/hr (0-15)  H  11/26/18  14:00    


 


PT  14.1 Seconds (9.8-13.1)  H  11/26/18  10:28    


 


INR  1.2   11/26/18  10:28    


 


APTT  26.1 Seconds (25.6-37.1)   11/26/18  10:28    


 


Sodium  136 mmol/l (132-148)   11/30/18  04:45    


 


Potassium  4.4 MMOL/L (3.6-5.0)   11/30/18  04:45    


 


Chloride  101 mmol/L ()   11/30/18  04:45    


 


Carbon Dioxide  30 mmol/L (22-30)   11/30/18  04:45    


 


Anion Gap  9  (10-20)  L  11/30/18  04:45    


 


BUN  15 mg/dl (9-20)   11/30/18  04:45    


 


Creatinine  0.7 mg/dl (0.8-1.5)  L  11/30/18  04:45    


 


Est GFR ( Amer)  > 60   11/30/18  04:45    


 


Est GFR (Non-Af Amer)  > 60   11/30/18  04:45    


 


Random Glucose  117 mg/dL ()  H  11/30/18  04:45    


 


Calcium  9.3 mg/dL (8.4-10.2)   11/30/18  04:45    


 


Total Bilirubin  0.1 mg/dl (0.2-1.3)  L  11/30/18  04:45    


 


AST  46 U/L (17-59)   11/30/18  04:45    


 


ALT  36 U/L (21-72)   11/30/18  04:45    


 


Alkaline Phosphatase  47 U/L ()   11/30/18  04:45    


 


C-Reactive Protein  58.40 mg/L (0.0-9.9)  H  11/26/18  14:03    


 


Total Protein  6.7 G/DL (6.3-8.2)   11/30/18  04:45    


 


Albumin  3.1 g/dL (3.5-5.0)  L  11/30/18  04:45    


 


Globulin  3.6 gm/dL (2.2-3.9)   11/30/18  04:45    


 


Albumin/Globulin Ratio  0.9  (1.0-2.1)  L  11/30/18  04:45    


 


Procalcitonin  < 0.05 NG/ML (0.19-0.49)  L  11/30/18  14:05    


 


Urine Color  Paloma  (YELLOW)   11/26/18  14:38    


 


Urine Clarity  Slighty-cloudy  (Clear)   11/26/18  14:38    


 


Urine pH  5.0  (5.0-8.0)   11/26/18  14:38    


 


Ur Specific Gravity  1.026  (1.003-1.030)   11/26/18  14:38    


 


Urine Protein  Negative mg/dL (NEGATIVE)   11/26/18  14:38    


 


Urine Glucose (UA)  Neg mg/dL (Normal)   11/26/18  14:38    


 


Urine Ketones  Trace mg/dL (NEGATIVE)   11/26/18  14:38    


 


Urine Blood  Negative  (NEGATIVE)   11/26/18  14:38    


 


Urine Nitrate  Negative  (NEGATIVE)   11/26/18  14:38    


 


Urine Bilirubin  Negative  (NEGATIVE)   11/26/18  14:38    


 


Urine Urobilinogen  4.0 mg/dL (0.2-1.0)   11/26/18  14:38    


 


Ur Leukocyte Esterase  Neg Franky/uL (Negative)   11/26/18  14:38    


 


Urine RBC (Auto)  1 /hpf (0-3)   11/26/18  14:38    


 


Urine Microscopic WBC  2 /hpf (0-5)   11/26/18  14:38    


 


Ur Squamous Epith Cells  1 /hpf (0-5)   11/26/18  14:38    


 


Vancomycin Trough  12.4 ug/mL (5.0-10.0)  H  11/28/18  18:30    


 


Urine Opiates Screen  Positive  (NEGATIVE)  H  11/26/18  14:38    


 


Urine Methadone Screen  Negative  (NEGATIVE)   11/26/18  14:38    


 


Ur Barbiturates Screen  Negative  (NEGATIVE)   11/26/18  14:38    


 


Ur Phencyclidine Scrn  Negative  (NEGATIVE)   11/26/18  14:38    


 


Ur Amphetamines Screen  Negative  (NEGATIVE)   11/26/18  14:38    


 


U Benzodiazepines Scrn  Positive  (NEGATIVE)   11/26/18  14:38    


 


U Oth Cocaine Metabols  Negative  (NEGATIVE)   11/26/18  14:38    


 


U Cannabinoids Screen  Positive  (NEGATIVE)  H  11/26/18  14:38    


 


HIV-1 Ab Rapid Screen  Non reactive  (NON REAC)   11/26/18  15:53    


 


TB Test (QFT) Nil  0.21 IU/mL  11/29/18  10:56    


 


TB Test Mitogen - Nil  0.07 IU/mL  11/29/18  10:56    


 


TB Test TB - Nil  <0.00 IU/mL  11/29/18  10:56    


 


TB Test (QFT)  Indeterminate  (Negative)  H  11/29/18  10:56    














- Hospital Course


Hospital Course: 





MOVEMENT OF EXTREMITIES IMPROVED WITH SURGERY








Discharge Exam





- Head Exam


Head Exam: ATRAUMATIC, NORMAL INSPECTION, NORMOCEPHALIC





- Eye Exam


Eye Exam: EOMI, Normal appearance, PERRL


Pupil Exam: NORMAL ACCOMODATION, PERRL





- GI/Abdominal Exam


GI & Abdominal Exam: Normal Bowel Sounds





- Rectal Exam


Rectal Exam: NORMAL INSPECTION





- Extremities Exam


Additional comments: 





RANGE OF MOTION IMPROVED





- Neurological Exam


Neurological exam: Abnormal Gait, Alert, CN II-XII Intact, Oriented x3, Reflexes

Normal





- Psychiatric Exam


Psychiatric exam: Normal Affect, Normal Mood





- Skin


Skin Exam: Dry, Intact, Normal Color, Warm





Discharge Plan





- Follow Up Plan


Condition: GUARDED


Disposition: REHAB FACILITY/REHAB UNIT


Patient education suggested?: Yes


Additional Instructions: 


TRANSFER TO ACUTE REHAB

## 2018-12-02 NOTE — CP.PCM.CON
History of Present Illness





- History of Present Illness


History of Present Illness: 





32 yo male admitted to rehab from ICU  Presented here with 1-2 wk hx of back 

pain   progressing to leg weakness and incontinence of bowel/bladder 


Admitted to ICU with discitis/ epidural abscess  C4-C7   Went  to OR today for 

abscess drainage / decompression   Post op course shows improvement in weakness 

and cultures + for Acinetobacter    Now transferreed to rehab for aggressive PT 

and continuation of antibiotic rx 





PMH- back pain


SH  + smoker   ena  IVDA 


FH   N/C


NKDA 





Review of Systems





- Review of Systems


All systems: reviewed and no additional remarkable complaints except





- Constitutional


Constitutional: As Per HPI.  absent: Chills, Fever





- EENT


Eyes: absent: As Per HPI, Blind Spots, Blurred Vision, Change in Vision, 

Decreased Night Vision, Diplopia, Discharge, Dry Eye, Exophthalmos, Floaters, 

Irritation, Itchy Eyes, Loss of Peripheral Vision, Pain, Photophobia, Requires 

Corrective Lenses, Sees Flashes, Spots in Vision, Tunnel Vision, Other Visual 

Disturbances, Loss of Vision, Other


Ears: absent: As Per HPI, Decreased Hearing, Ear Discharge, Ear Pain, Tinnitus, 

Abnormal Hearing, Disequilibrium, Dizziness, Other


Nose/Mouth/Throat: absent: As Per HPI, Epistaxis, Nasal Congestion, Nasal 

Discharge, Nasal Obstruction, Nasal Trauma, Nose Pain, Post Nasal Drip, Sinus 

Pain, Sinus Pressure, Bleeding Gums, Change in Voice, Dental Pain, Dry Mouth, 

Dysphagia, Halitosis, Hoarsness, Lip Swelling, Mouth Lesions, Mouth Pain, 

Odynophagia, Sore Throat, Throat Swelling, Tongue Swelling, Facial Pain, Neck 

Pain, Neck Mass, Other





- Cardiovascular


Cardiovascular: absent: As Per HPI, Acrocyanosis, Chest Pain, Chest Pain at 

Rest, Chest Pain with Activity, Claudication, Diaphoresis, Dyspnea, Dyspnea on 

Exertion, Edema, Irregular Heart Rhythm, Pain Radiating to Arm/Neck/Jaw, Leg 

Edema, Leg Ulcers, Lightheadedness, Orthopnea, Palpitations, Paroxysmal 

Nocturnal Dyspnea, Pedal Edema, Radiating Pain, Rapid Heart Rate, Slow Heart 

Rate, Syncope, Other





- Respiratory


Respiratory: absent: As Per HPI, Cough, Dyspnea, Hemoptysis, Dyspnea on 

Exertion, Wheezing, Snoring, Stridor, Pain on Inspiration, Chest Congestion, 

Excessive Mucous Production, Change in Mucous Color, Pain with Coughing, Other





- Gastrointestinal


Gastrointestinal: absent: As Per HPI, Abdominal Pain, Belching, Bloating, Change

in Bowel Habits, Change in Stool Character, Coffee Ground Emesis, Constipation, 

Cramping, Diarrhea, Dyspepsia, Dysphagia, Early Satiety, Excessive Flatus, Fecal

Incontinence, Heartburn, Hematemesis, Hematochezia, Loose Stools, Melena, 

Nausea, Odynophagia, Temesmus, Vomiting, Other





- Genitourinary


Genitourinary: absent: As Per HPI, Change in Urinary Stream, Difficulty 

Urinating, Dysuria, Flank Pain, Hematuria, Pyuria, Nocturia, Urinary 

Incontinence, Urinary Frequency, Urinary Hesitance, Urinary Urgency, Voiding 

Freq/Small Amts, Freq UTI, Hx Renal/Bladder Calculi, Hx /Renal Surgery, 

Bladder Distension, Other





- Musculoskeletal


Musculoskeletal: As Per HPI





- Integumentary


Integumentary: As Per HPI





- Neurological


Neurological: As Per HPI





- Psychiatric


Psychiatric: absent: As Per HPI, Abnormal Sleep Pattern, Anhedonia, Anxiety, 

Auditory Hallucinations, Behavioral Changes, Change in Appetite, Change in 

Libido, Confusion, Depression, Difficulty Concentrating, Hallucinations, 

Homicidal Ideation, Hopelessness, Irritability, Memory Loss, Mood Swings, Panic 

Attacks, Paranoia, Suicidal Ideation, Visual Hallucinations, Tactile 

Hallucinations, Other





- Endocrine


Endocrine: absent: As Per HPI, Change in Body Appearance, Change in Libido, Cold

Intolorance, Deepening of Voice, Excessive Sweating, Fatigue, Flushing, Heat 

Intolorance, Increase in Ring/Shoe/Hat Size, Palpitations, Polydipsia, 

Polyphagia, Polyuria, Other





- Hematologic/Lymphatic


Hematologic: absent: As Per HPI, Easy Bleeding, Easy Bruising, Lymphadenopathy, 

Other





Past Patient History





- Past Medical History & Family History


Past Medical History?: Yes





- Past Social History


Smoking Status: Current Some Days Smoker


Alcohol: < 2 Drinks/Day


Drugs: Cannabis


Home Situation {Lives}: With Family





- CARDIAC


Hx Cardiac Disorders: No





- PULMONARY


Hx Respiratory Disorders: No





- HEMATOLOGICAL/ONCOLOGICAL


Hx AIDS: No


Hx Human Immunodeficiency Virus (HIV): No





- MUSCULOSKELETAL/RHEUMATOLOGICAL


Hx Falls: Yes (few days ago)





- PSYCHIATRIC


Hx Substance Use: Yes (claimed smoke Marijuana)





- SURGICAL HISTORY


Hx Surgeries: Yes


Other/Comment: hernia surgery





- ANESTHESIA


Hx Anesthesia: Yes


Hx Anesthesia Reactions: No


Hx Malignant Hyperthermia: No





Meds


Allergies/Adverse Reactions: 


                                    Allergies











Allergy/AdvReac Type Severity Reaction Status Date / Time


 


No Known Allergies Allergy   Verified 11/30/18 15:54














- Medications


Medications: 


                               Current Medications





Acetaminophen (Tylenol 325mg Tab)  650 mg PO Q6 PRN


   PRN Reason: for pain scale  3-4 


Baclofen (Lioresal)  5 mg PO TID [facility]


   Last Admin: 12/02/18 08:26 Dose:  5 mg


Dexamethasone (Decadron Inj)  4 mg IVP Q6 [facility]


   Last Admin: 12/02/18 12:05 Dose:  4 mg


Docusate Sodium (Colace)  100 mg PO BID [facility]


   Last Admin: 12/02/18 08:25 Dose:  100 mg


Cefepime HCl 2 gm/ Sodium (Chloride)  100 mls @ 100 mls/hr IVPB Q

8@0600,1400,2200 [facility]


   Last Admin: 12/02/18 05:22 Dose:  100 mls/hr


Vancomycin HCl 1 gm/ Sodium (Chloride)  250 mls @ 166.667 mls/hr IVPB Q12H [facility]


   Last Admin: 12/02/18 12:14 Dose:  166.667 mls/hr


Lactulose (Enulose)  20 gm PO DAILY PRN


   PRN Reason: Constipation


Oxycodone HCl (Oxycodone Immediate Release Tab)  5 mg PO Q6 PRN


   PRN Reason: pain 5-10/10


   Last Admin: 12/02/18 04:57 Dose:  5 mg











Physical Exam





- Constitutional


Appears: Non-toxic, No Acute Distress





- Head Exam


Head Exam: ATRAUMATIC, NORMAL INSPECTION, NORMOCEPHALIC





- Eye Exam


Eye Exam: EOMI, PERRL.  absent: Scleral icterus





- ENT Exam


ENT Exam: Mucous Membranes Dry


Additional comments: 





Neck collar in place 





- Neck Exam


Neck exam: Negative for: Lymphadenopathy





- Respiratory Exam


Respiratory Exam: Decreased Breath Sounds, Clear to Auscultation Bilateral





- Cardiovascular Exam


Cardiovascular Exam: REGULAR RHYTHM, +S1, +S2





- GI/Abdominal Exam


GI & Abdominal Exam: Diminished Bowel Sounds, Soft.  absent: Tenderness





- Rectal Exam


Rectal Exam: Deferred





-  Exam


 Exam: NORMAL INSPECTION





- Extremities Exam


Extremities exam: Negative for: pedal edema





- Back Exam


Back exam: absent: CVA tenderness (L), CVA tenderness (R)





- Neurological Exam


Neurological exam: Alert, CN II-XII Intact, Motor Sensory Deficit, Oriented x3


Additional comments: 





weakness in lower extremities persists   4/5 upper  3/5 lower 





- Psychiatric Exam


Psychiatric exam: Normal Mood





- Skin


Skin Exam: Dry





Results





- Vital Signs


Recent Vital Signs: 


                                Last Vital Signs











Temp  97.9 F   12/02/18 09:50


 


Pulse  64   12/02/18 09:50


 


Resp  19   12/02/18 09:50


 


BP  122/70   12/02/18 09:50


 


Pulse Ox  98   12/02/18 09:50














- Labs


Result Diagrams: 


                                 12/01/18 06:30





                                 12/01/18 06:30





Assessment & Plan


(1) Back pain


Status: Acute   





(2) Discitis of cervical region


Status: Acute   





(3) Epidural abscess


Status: Acute   





(4) Osteomyelitis of cervical spine


Status: Acute   





- Assessment and Plan (Free Text)


Assessment: 





cont iv antibiotics for min 6 weeks


consider ASHLEY if neuro status allows

## 2018-12-02 NOTE — CP.PCM.PN
Subjective





- Date & Time of Evaluation


Date of Evaluation: 12/02/18


Time of Evaluation: 10:36





- Subjective


Subjective: 





DENIES PAIN


MOVING ALL EXTREMITIES


MOVED BOWELS


ALERT AND ORIENTED X 3





Objective





- Vital Signs/Intake and Output


Vital Signs (last 24 hours): 


                                        











Temp Pulse Resp BP Pulse Ox


 


 97.9 F   64   19   122/70   98 


 


 12/02/18 09:50  12/02/18 09:50  12/02/18 09:50  12/02/18 09:50  12/02/18 09:50








Intake and Output: 


                                        











 12/02/18 12/02/18





 06:59 18:59


 


Intake Total 1200 


 


Output Total 2100 


 


Balance -900 














- Medications


Medications: 


                               Current Medications





Acetaminophen (Tylenol 325mg Tab)  650 mg PO Q6 PRN


   PRN Reason: for pain scale  3-4 


Baclofen (Lioresal)  5 mg PO TID [facility]


   Last Admin: 12/02/18 08:26 Dose:  5 mg


Dexamethasone (Decadron Inj)  4 mg IVP Q6 [facility]


   Last Admin: 12/02/18 05:16 Dose:  4 mg


Docusate Sodium (Colace)  100 mg PO BID [facility]


   Last Admin: 12/02/18 08:25 Dose:  100 mg


Cefepime HCl 2 gm/ Sodium (Chloride)  100 mls @ 100 mls/hr IVPB 

Q8@0600,1400,2200 [facility]


   Last Admin: 12/02/18 05:22 Dose:  100 mls/hr


Vancomycin HCl 1 gm/ Sodium (Chloride)  250 mls @ 166.667 mls/hr IVPB Q12H [facility]


   Last Admin: 12/02/18 00:06 Dose:  166.667 mls/hr


Oxycodone HCl (Oxycodone Immediate Release Tab)  5 mg PO Q6 PRN


   PRN Reason: pain 5-10/10


   Last Admin: 12/02/18 04:57 Dose:  5 mg











- Labs


Labs: 


                                        





                                 12/01/18 06:30 





                                 12/01/18 06:30 











- Constitutional


Appears: No Acute Distress





- Head Exam


Head Exam: ATRAUMATIC, NORMAL INSPECTION, NORMOCEPHALIC





- Eye Exam


Eye Exam: EOMI, Normal appearance, PERRL


Pupil Exam: NORMAL ACCOMODATION, PERRL





- ENT Exam


ENT Exam: Mucous Membranes Moist, Normal Exam





- Neck Exam


Neck Exam: Full ROM, Normal Inspection.  absent: Lymphadenopathy





- Respiratory Exam


Respiratory Exam: Clear to Ausculation Bilateral, NORMAL BREATHING PATTERN





- Cardiovascular Exam


Cardiovascular Exam: REGULAR RHYTHM, +S1, +S2.  absent: Murmur





- GI/Abdominal Exam


GI & Abdominal Exam: Soft, Normal Bowel Sounds.  absent: Tenderness





- Rectal Exam


Rectal Exam: NORMAL INSPECTION





- Extremities Exam


Extremities Exam: Full ROM, Normal Capillary Refill, Normal Inspection.  absent:

Joint Swelling, Pedal Edema





- Back Exam


Back Exam: NORMAL INSPECTION





- Neurological Exam


Neurological Exam: Alert, Awake, CN II-XII Intact, Normal Gait, Oriented x3


Neuro motor strength exam: Left Upper Extremity: 3, Right Upper Extremity: 3, 

Left Lower Extremity: 3, Right Lower Extremity: 3





- Psychiatric Exam


Psychiatric exam: Normal Affect, Normal Mood





- Skin


Skin Exam: Dry, Intact, Normal Color, Warm





Assessment and Plan





- Assessment and Plan (Free Text)


Assessment: 





DISCITIS


S/P NEUROSURGICAL DECOMPRESSION AND RAINAGE OF ABSCESS


PARAPLEGIA IMPROVED


Plan: 





CONTINUE AGGRESSIVE PT/OT

## 2018-12-03 LAB
ALBUMIN SERPL-MCNC: 3.1 G/DL (ref 3.5–5)
ALBUMIN/GLOB SERPL: 0.9 {RATIO} (ref 1–2.1)
ALT SERPL-CCNC: 43 U/L (ref 21–72)
AST SERPL-CCNC: 26 U/L (ref 17–59)
BASOPHILS # BLD AUTO: 0 K/UL (ref 0–0.2)
BASOPHILS NFR BLD: 0 % (ref 0–2)
BUN SERPL-MCNC: 13 MG/DL (ref 9–20)
CALCIUM SERPL-MCNC: 9.2 MG/DL (ref 8.4–10.2)
EOSINOPHIL # BLD AUTO: 0 K/UL (ref 0–0.7)
EOSINOPHIL NFR BLD: 0.1 % (ref 0–4)
ERYTHROCYTE [DISTWIDTH] IN BLOOD BY AUTOMATED COUNT: 13 % (ref 11.5–14.5)
ERYTHROCYTE [SEDIMENTATION RATE] IN BLOOD: 33 MM/HR (ref 0–15)
GFR NON-AFRICAN AMERICAN: > 60
HGB BLD-MCNC: 12.2 G/DL (ref 12–18)
LYMPHOCYTE: 6 % (ref 20–50)
LYMPHOCYTES # BLD AUTO: 0.8 K/UL (ref 1–4.3)
LYMPHOCYTES NFR BLD AUTO: 4.7 % (ref 20–40)
MCH RBC QN AUTO: 30.9 PG (ref 27–31)
MCHC RBC AUTO-ENTMCNC: 33.1 G/DL (ref 33–37)
MCV RBC AUTO: 93.5 FL (ref 80–94)
MONOCYTE: 5 % (ref 0–10)
MONOCYTES # BLD: 0.9 K/UL (ref 0–0.8)
MONOCYTES NFR BLD: 5.4 % (ref 0–10)
NEUTROPHILS # BLD: 15.2 K/UL (ref 1.8–7)
NEUTROPHILS NFR BLD AUTO: 89 % (ref 42–75)
NEUTROPHILS NFR BLD AUTO: 89.8 % (ref 50–75)
NRBC BLD AUTO-RTO: 0 % (ref 0–0)
PLATELET # BLD EST: (no result) 10*3/UL
PLATELET # BLD: 413 K/UL (ref 130–400)
PMV BLD AUTO: 7.6 FL (ref 7.2–11.7)
RBC # BLD AUTO: 3.94 MIL/UL (ref 4.4–5.9)
RBC MORPH BLD: NORMAL
TOTAL CELLS COUNTED BLD: 100
WBC # BLD AUTO: 16.9 K/UL (ref 4.8–10.8)

## 2018-12-03 RX ADMIN — DEXAMETHASONE SODIUM PHOSPHATE SCH MG: 4 INJECTION, SOLUTION INTRAMUSCULAR; INTRAVENOUS at 17:01

## 2018-12-03 RX ADMIN — DEXAMETHASONE SODIUM PHOSPHATE SCH MG: 4 INJECTION, SOLUTION INTRAMUSCULAR; INTRAVENOUS at 06:21

## 2018-12-03 RX ADMIN — Medication PRN MG: at 11:25

## 2018-12-03 RX ADMIN — DEXAMETHASONE SODIUM PHOSPHATE SCH MG: 4 INJECTION, SOLUTION INTRAMUSCULAR; INTRAVENOUS at 11:26

## 2018-12-03 NOTE — CP.PCM.PN
Subjective





- Date & Time of Evaluation


Date of Evaluation: 12/03/18


Time of Evaluation: 09:17





- Subjective


Subjective: 





UPSET AND REFUSING TO SPEAK


AS PER NURSES,WANTS TO GO HOME


NO NEW CLINICAL FINDINGS


WILL OBTAIN PSYCH CONSULT


NURSES TO REACH OUT TO FAMILY RE-DISPOSITION





Objective





- Vital Signs/Intake and Output


Vital Signs (last 24 hours): 


                                        











Temp Pulse Resp BP Pulse Ox


 


 98.4 F   50 L  18   126/77   97 


 


 12/03/18 08:35  12/03/18 08:35  12/03/18 08:35  12/03/18 08:35  12/03/18 08:35








Intake and Output: 


                                        











 12/03/18 12/03/18





 06:59 18:59


 


Intake Total 1450 


 


Output Total 1500 


 


Balance -50 














- Medications


Medications: 


                               Current Medications





Acetaminophen (Tylenol 325mg Tab)  650 mg PO Q6 PRN


   PRN Reason: for pain scale  3-4 


Baclofen (Lioresal)  5 mg PO TID [facility]


   Last Admin: 12/03/18 08:08 Dose:  5 mg


Dexamethasone (Decadron Inj)  4 mg IVP Q6 [facility]


   Last Admin: 12/03/18 06:21 Dose:  4 mg


Docusate Sodium (Colace)  100 mg PO BID [facility]


   Last Admin: 12/03/18 08:07 Dose:  100 mg


Cefepime HCl 2 gm/ Sodium (Chloride)  100 mls @ 100 mls/hr IVPB 

Q8@0600,1400,2200 [facility]


   Last Admin: 12/03/18 05:00 Dose:  100 mls/hr


Vancomycin HCl 1 gm/ Sodium (Chloride)  250 mls @ 166.667 mls/hr IVPB Q12H [facility]


   Last Admin: 12/03/18 00:02 Dose:  166.667 mls/hr


Lactulose (Enulose)  20 gm PO DAILY PRN


   PRN Reason: Constipation


   Last Admin: 12/02/18 13:29 Dose:  20 gm


Oxycodone HCl (Oxycodone Immediate Release Tab)  5 mg PO Q6 PRN


   PRN Reason: pain 5-10/10


   Last Admin: 12/02/18 04:57 Dose:  5 mg











- Labs


Labs: 


                                        





                                 12/03/18 05:35 





                                 12/03/18 05:35

## 2018-12-03 NOTE — CP.PCM.PN
Subjective





- Date & Time of Evaluation


Date of Evaluation: 12/03/18


Time of Evaluation: 12:15





- Subjective


Subjective: 


Neurology Consultation Follow-Up Note:





Mr. Ken is well known to our service.  He was initially admitted to [facility] with

a several day onset of neck pain with progressive weakness and inability to 

walk.  MRI showed epidural abcess, which was evacuated by neurosurgery (today he

is POD # 6).  Mr. Ken was in the ICU for several days, recovered strength, 

and now being managed in acute rehab.  Today pt is in good spirits.  He was 

evaluated during PT session but is doing well overall.  Pt admits that his neck 

pain has resolved.  He admits to left hand and fingers tingling/numbness.  Dr. Anderson's note reviewed from the weekend and she recommends Neurontin daily.  Pt 

denies headache, dizziness, visual changes, chest pain, shortness of breath, abd

pain, n/v/d.  








Objective





- Vital Signs/Intake and Output


Vital Signs (last 24 hours): 


                                        











Temp Pulse Resp BP Pulse Ox


 


 98.4 F   50 L  18   126/77   97 


 


 12/03/18 08:35  12/03/18 08:35  12/03/18 08:35  12/03/18 08:35  12/03/18 08:35








Intake and Output: 


                                        











 12/03/18 12/03/18





 06:59 18:59


 


Intake Total 1450 


 


Output Total 1500 


 


Balance -50 














- Medications


Medications: 


                               Current Medications





Acetaminophen (Tylenol 325mg Tab)  650 mg PO Q6 PRN


   PRN Reason: for pain scale  3-4 


Baclofen (Lioresal)  5 mg PO TID [facility]


   Last Admin: 12/03/18 08:08 Dose:  5 mg


Dexamethasone (Decadron Inj)  4 mg IVP Q6 [facility]


   Last Admin: 12/03/18 11:26 Dose:  4 mg


Docusate Sodium (Colace)  100 mg PO BID [facility]


   Last Admin: 12/03/18 08:07 Dose:  100 mg


Cefepime HCl 2 gm/ Sodium (Chloride)  100 mls @ 100 mls/hr IVPB 

Q8@0600,1400,2200 [facility]


   Last Admin: 12/03/18 05:00 Dose:  100 mls/hr


Vancomycin HCl 1 gm/ Sodium (Chloride)  250 mls @ 166.667 mls/hr IVPB Q12H [facility]


   Last Admin: 12/03/18 00:02 Dose:  166.667 mls/hr


Lactulose (Enulose)  20 gm PO DAILY PRN


   PRN Reason: Constipation


   Last Admin: 12/02/18 13:29 Dose:  20 gm


Oxycodone HCl (Oxycodone Immediate Release Tab)  5 mg PO Q6 PRN


   PRN Reason: pain 5-10/10


   Last Admin: 12/03/18 11:25 Dose:  5 mg











- Labs


Labs: 


                                        





                                 12/03/18 05:35 





                                 12/03/18 05:35 











- Constitutional


Appears: Well, Non-toxic, No Acute Distress





- Head Exam


Head Exam: ATRAUMATIC, NORMAL INSPECTION, NORMOCEPHALIC





- Eye Exam


Eye Exam: EOMI, Normal appearance, PERRL


Pupil Exam: NORMAL ACCOMODATION, PERRL





- ENT Exam


ENT Exam: Mucous Membranes Moist





- Neck Exam


Additional comments: 





miami j-collar maintained





- Respiratory Exam


Respiratory Exam: NORMAL BREATHING PATTERN





- Extremities Exam


Extremities Exam: Full ROM (FROM to all extremities; still has weakness to BLE),

Normal Inspection.  absent: Calf Tenderness, Pedal Edema





- Neurological Exam


Neurological Exam: Alert, Awake, CN II-XII Intact, Oriented x3


Neuro motor strength exam: Left Upper Extremity: 5 ( 4/5), Right Upper E

xtremity: 5 ( 4/5), Left Lower Extremity: 3 (hypertonicity noted), Right 

Lower Extremity: 3 (hypertonicity noted)


Additional comments: 





speech clear and fluid


sensation intact and equal BLE and BUE


hyperflexia BLE








- Psychiatric Exam


Psychiatric exam: Normal Affect, Normal Mood





- Skin


Skin Exam: Normal Color





Assessment and Plan


(1) Epidural abscess


Assessment & Plan: 


-Mr. Ken is improving.


-Paresthesias to left hand and fingers likely neuropathic---will start Neurontin

300 mg PO HS per Dr. Anderson's recommendations over the weekend--pt agrees.


-Continue PT/OT therapy.


-Please notify neuro team of any acute changes in condition.


-We will continue to follow the pt while in rehab.





Case discussed with Dr. Houston.





Status: Acute

## 2018-12-03 NOTE — CP.PCM.PN
Subjective





- Date & Time of Evaluation


Date of Evaluation: 12/03/18


Time of Evaluation: 18:11





- Subjective


Subjective: 





Patient seen in the room.


C-collar in place


discussed with staff that he had some increased spasms in the mid back with UE 

erg.


+ constipation.  Will give dulcolax supp.


suspected neurogenic bladder with retention will maintain easton over the next 

couple of days then will consider to d/c easton and do a PVR check. 


pain is controlled


working hard


doing sliding board transfers.





Objective





- Vital Signs/Intake and Output


Vital Signs (last 24 hours): 


                                        











Temp Pulse Resp BP Pulse Ox


 


 98.4 F   50 L  18   126/77   97 


 


 12/03/18 08:35  12/03/18 08:35  12/03/18 08:35  12/03/18 08:35  12/03/18 08:35








Intake and Output: 


                                        











 12/03/18 12/03/18





 06:59 18:59


 


Intake Total 1450 1250


 


Output Total 1500 1120


 


Balance -50 130














- Medications


Medications: 


                               Current Medications





Acetaminophen (Tylenol 325mg Tab)  650 mg PO Q6 PRN


   PRN Reason: for pain scale  3-4 


Baclofen (Lioresal)  5 mg PO TID [facility]


   Last Admin: 12/03/18 17:01 Dose:  5 mg


Bisacodyl (Dulcolax)  10 mg [place] DAILY PRN


   PRN Reason: Constipation


Dexamethasone (Decadron Inj)  4 mg IVP Q6 [facility]


   Last Admin: 12/03/18 17:01 Dose:  4 mg


Docusate Sodium (Colace)  100 mg PO BID [facility]


   Last Admin: 12/03/18 17:01 Dose:  100 mg


Gabapentin (Neurontin)  300 mg PO HS [facility]


Cefepime HCl 2 gm/ Sodium (Chloride)  100 mls @ 100 mls/hr IVPB Q8@0600,1400,220

0 [facility]


   Last Admin: 12/03/18 14:14 Dose:  100 mls/hr


Vancomycin HCl 1 gm/ Sodium (Chloride)  250 mls @ 166.667 mls/hr IVPB Q12H [facility]


   Last Admin: 12/03/18 12:34 Dose:  166.667 mls/hr


Lactulose (Enulose)  20 gm PO DAILY PRN


   PRN Reason: Constipation


   Last Admin: 12/03/18 12:35 Dose:  20 gm


Oxycodone HCl (Oxycodone Immediate Release Tab)  5 mg PO Q6 PRN


   PRN Reason: pain 5-10/10


   Last Admin: 12/03/18 11:25 Dose:  5 mg











- Labs


Labs: 


                                        





                                 12/03/18 05:35 





                                 12/03/18 05:35

## 2018-12-04 RX ADMIN — DEXAMETHASONE SODIUM PHOSPHATE SCH MG: 4 INJECTION, SOLUTION INTRAMUSCULAR; INTRAVENOUS at 00:16

## 2018-12-04 RX ADMIN — DEXAMETHASONE SODIUM PHOSPHATE SCH MG: 4 INJECTION, SOLUTION INTRAMUSCULAR; INTRAVENOUS at 06:14

## 2018-12-04 RX ADMIN — DEXAMETHASONE SODIUM PHOSPHATE SCH MG: 4 INJECTION, SOLUTION INTRAMUSCULAR; INTRAVENOUS at 12:47

## 2018-12-04 NOTE — CP.PCM.PN
Subjective





- Date & Time of Evaluation


Date of Evaluation: 12/04/18


Time of Evaluation: 19:01





- Subjective


Subjective: 





discussed with Fausto at length issues that are important to him


I will increase the Baclofen to 10mg tid to help with his tone issues and will 

increase as necessary


I will also give him zanaflex at night and go up to 8mg if necessary as he 

usually is smoking marijuana throughout the day and has some prior psych issues 

and anger issues that he uses this to help control


I will consider Valium if necessary as well at night


NO BM yet and I have written for fleets enema





Objective





- Vital Signs/Intake and Output


Vital Signs (last 24 hours): 


                                        











Temp Pulse Resp BP Pulse Ox


 


 98.1 F   51 L  20   128/57 L  98 


 


 12/04/18 10:00  12/04/18 10:00  12/04/18 10:00  12/04/18 10:00  12/04/18 10:00








Intake and Output: 


                                        











 12/04/18 12/05/18





 18:59 06:59


 


Intake Total 1250 


 


Output Total 1050 


 


Balance 200 














- Medications


Medications: 


                               Current Medications





Acetaminophen (Tylenol 325mg Tab)  650 mg PO Q6 PRN


   PRN Reason: for pain scale  3-4 


Baclofen (Lioresal)  10 mg PO TID [facility]


   Last Admin: 12/04/18 17:09 Dose:  10 mg


Bisacodyl (Dulcolax)  10 mg [place] DAILY PRN


   PRN Reason: Constipation


   Last Admin: 12/04/18 06:15 Dose:  10 mg


Dexamethasone (Decadron)  4 mg PO Q8 [facility]


Docusate Sodium (Colace)  100 mg PO BID [facility]


   Last Admin: 12/04/18 17:09 Dose:  100 mg


Gabapentin (Neurontin)  300 mg PO HS [facility]


   Last Admin: 12/03/18 21:43 Dose:  300 mg


Cefepime HCl 2 gm/ Sodium (Chloride)  100 mls @ 100 mls/hr IVPB 

Q8@0600,1400,2200 [facility]


   Last Admin: 12/04/18 14:23 Dose:  100 mls/hr


Vancomycin HCl 1 gm/ Sodium (Chloride)  250 mls @ 166.667 mls/hr IVPB Q12H [facility]


   Last Admin: 12/04/18 12:47 Dose:  166.667 mls/hr


Lactulose (Enulose)  20 gm PO DAILY PRN


   PRN Reason: Constipation


   Last Admin: 12/04/18 17:11 Dose:  20 gm


Oxycodone HCl (Oxycodone Immediate Release Tab)  5 mg PO Q6 PRN


   PRN Reason: pain 5-10/10


   Last Admin: 12/03/18 11:25 Dose:  5 mg


Tizanidine HCl (Zanaflex)  4 mg PO  [facility]











- Labs


Labs: 


                                        





                                 12/03/18 05:35 





                                 12/03/18 05:35

## 2018-12-04 NOTE — CP.PCM.PN
Subjective





- Date & Time of Evaluation


Date of Evaluation: 12/04/18


Time of Evaluation: 10:06





- Subjective


Subjective: 





RESPONDS TO VERBAL COMMANDS TODAY BUT IS APPREHENSIVE--WANTS MORE FOOD AND 

CLAIMS THAT THE FOOD IS NASTY ANYWAY


MOVING ALL EXTREMITIES





Objective





- Vital Signs/Intake and Output


Vital Signs (last 24 hours): 


                                        











Temp Pulse Resp BP Pulse Ox


 


 98.1 F   51 L  20   128/57 L  97 


 


 12/04/18 08:40  12/04/18 08:40  12/04/18 08:40  12/04/18 08:40  12/03/18 21:00








Intake and Output: 


                                        











 12/04/18 12/04/18





 06:59 18:59


 


Intake Total 880 


 


Output Total 1600 


 


Balance -720 














- Medications


Medications: 


                               Current Medications





Acetaminophen (Tylenol 325mg Tab)  650 mg PO Q6 PRN


   PRN Reason: for pain scale  3-4 


Baclofen (Lioresal)  5 mg PO TID [facility]


   Last Admin: 12/04/18 08:10 Dose:  5 mg


Bisacodyl (Dulcolax)  10 mg [place] DAILY PRN


   PRN Reason: Constipation


   Last Admin: 12/04/18 06:15 Dose:  10 mg


Dexamethasone (Decadron Inj)  4 mg IVP Q6 [facility]


   Last Admin: 12/04/18 06:14 Dose:  4 mg


Docusate Sodium (Colace)  100 mg PO BID [facility]


   Last Admin: 12/04/18 08:09 Dose:  100 mg


Gabapentin (Neurontin)  300 mg PO HS [facility]


   Last Admin: 12/03/18 21:43 Dose:  300 mg


Cefepime HCl 2 gm/ Sodium (Chloride)  100 mls @ 100 mls/hr IVPB 

Q8@0600,1400,2200 [facility]


   Last Admin: 12/04/18 06:17 Dose:  100 mls/hr


Vancomycin HCl 1 gm/ Sodium (Chloride)  250 mls @ 166.667 mls/hr IVPB Q12H [facility]


   Last Admin: 12/04/18 00:20 Dose:  166.667 mls/hr


Lactulose (Enulose)  20 gm PO DAILY PRN


   PRN Reason: Constipation


   Last Admin: 12/03/18 12:35 Dose:  20 gm


Oxycodone HCl (Oxycodone Immediate Release Tab)  5 mg PO Q6 PRN


   PRN Reason: pain 5-10/10


   Last Admin: 12/03/18 11:25 Dose:  5 mg











- Labs


Labs: 


                                        





                                 12/03/18 05:35 





                                 12/03/18 05:35 











- Constitutional


Appears: No Acute Distress





- Head Exam


Head Exam: ATRAUMATIC, NORMAL INSPECTION, NORMOCEPHALIC





- Eye Exam


Eye Exam: EOMI, Normal appearance, PERRL


Pupil Exam: NORMAL ACCOMODATION, PERRL





- ENT Exam


ENT Exam: Mucous Membranes Moist, Normal Exam





- Neck Exam


Neck Exam: Full ROM, Normal Inspection.  absent: Lymphadenopathy





- Respiratory Exam


Respiratory Exam: Clear to Ausculation Bilateral, NORMAL BREATHING PATTERN





- Cardiovascular Exam


Cardiovascular Exam: REGULAR RHYTHM, +S1, +S2.  absent: Murmur





- GI/Abdominal Exam


GI & Abdominal Exam: Soft, Normal Bowel Sounds.  absent: Tenderness





- Rectal Exam


Rectal Exam: NORMAL INSPECTION





- Extremities Exam


Extremities Exam: Full ROM, Normal Capillary Refill, Normal Inspection.  absent:

Joint Swelling, Pedal Edema





- Back Exam


Back Exam: NORMAL INSPECTION





- Neurological Exam


Neurological Exam: Alert, Awake, CN II-XII Intact, Oriented x3





- Psychiatric Exam


Psychiatric exam: Normal Affect, Normal Mood





- Skin


Skin Exam: Dry, Intact, Normal Color, Warm





Assessment and Plan





- Assessment and Plan (Free Text)


Assessment: 





S/P SPINE SURGERY


DISCITIS--IMPROVED


Plan: 





CONTINUE AGGRESSIVE PT/OT

## 2018-12-04 NOTE — PCM.PSYTMC
Acute Rehab Team Conference -





- Vital Signs:


Vital Signs (Last 8 Hours): 


                                   Vital Signs











  12/04/18 12/04/18





  08:40 10:00


 


Temperature 98.1 F 98.1 F


 


Pulse Rate 51 L 51 L


 


Respiratory 20 20





Rate  


 


Blood Pressure 128/57 L 128/57 L


 


O2 Sat by Pulse  98





Oximetry  











Pain: 0





- Precautions:


Precautions: Fall Prevention





- Medications/Other Issues:


Comment: - Last BM 11/27/18, Despite Lactulose dose and colace. Started on 

Dulcolax supp daily.  - On Baclofen, no drowsiness noted.  - Started on 

Neurontin last night.





- Consults:


Comment: Dr. Morales, Dr. Anderson ,Dr. Wiggins, Dr. Esqueda, Dr. Monzon, Dr. ALVIN Day





- Toileting:


Toileting: Dependent





- Bladder Management:


Bladder Pattern: Retention


Voiding Method: Indwelling Catheter


Bladder Management: Dependent


Other Intervention:: 0





- Transfers:


Transfers: Dependent





- ADL's:


ADL's: Dependent





- Pain Management:


Other Intervention:: MSIR q6h PRN





- Patient/Family Teaching:


Other Intervention:: Care post Laminectomy and safety precautions





- Goals/Time Frame:


Comment: per multidisciplinary care plan and goals





- Provider:


Registered Nurse:: Aydee Garcia





Physical Therapy





- Bed Mobility


Comment: -rolling L: CG.  -rolling R: min/mod A.  -supine to sit: min A.  -sit 

to supine: total A.  -supine to/from long sit: min A





- Transfers


Wheelchair to Mat: Maximum Assistance, Dependent


Sit to Stand: Dependent


Comment: -slide board transfer with assist of 2; min/CG A of 1 anterior and 1 

posterior





- Ambulation


Level of Assistance: Not Tested





- Stair Negotiation


Stairs: Level of Assistance: Not Tested





- Standing Balance


Static Stand: Unable to perform


Comment: static standing limited by flexor tone in LEs with trunk flexion in 

sitting





- Pain


Pain (assessed during therapy session): 4


Alleviating Techniques: Medication, Distraction, Relaxation Techniques


Comment: cervical region





- Insight/Carryover


Insight/Carryover: Fair





- Patient/Family Education


Comment: -safety, therapy schedule, therapy goals, mobility, pain management, WC

propulsion, transfers, use of collar, importance of family encouragement





- Assessment/Plan


Assessment: Mr. Ken presents with impaired strength, impaired sensation, poor

postural control, alterations in tone from normal and pain s/p cervical abscess 

with subsequent cervical laminectomy.  Patients impairments are more evident in

the LEs and pelvis compared to UEs and abdomen and more prominent on the left 

side than the right side.  Patients primarily barrier to participate in 

functional mobility at this time is his hypertonicity and spasms in the LE.  

Patient is slowly improving but continues to be limited by this motions as he is

unable to maintain feet in contact with the ground to progress towards any 

mobility or weight bearing with maximal assistance and presence of 2 people.  PT

recommends medical management to assist with tone to improve patients ability 

to actively participate in functional mobility as well as psychology and 

psychiatry consultation to assist patient with adjustment s/p acute injury and 

resulting impairments.  PT recommends intensive skilled therapy services 5-6x 

per week for 5-6 weeks to maximize safety and I with all mobility with emphasis 

on education and reduction of burden of care.  PT recommends discharge home with

24 hour care vs. GILLIAN pending progress and care-taker/family support.  Pt will 

require continued therapy services in the appropriate setting to continue 

addressing skilled needs.  PT also recommends social support groups for patients

manage life with a similar injury to assist with community re-integration.  

Recommended discharge DME (to be updated as appropriate): specialized WC & 

hospital bed; possible need for bracing, slide board and gait devices





- Goals


Timeframe: 7 days


Goals: -TRANSFERS: lateral transfers with slide board with consistent CG A of 1 

to both R and L side.  -TRANSFERS: SPT with max A of 1 and mod A of 2nd person 

for safety.  -BED MOBILITY: rolling L w/ CS.  -BED MOBILITY: rolling R w/ CG.  -

BED MOBILITY: supine to sit w/ CG.  -BED MOBILITY: sit to supine w/ mod A with 

use of appropriate assistive/lifting device.  -BED MOBILITY: supine to/from long

sitting w/ supervision without use of bed rails.  -BALANCE: dynamic long sitting

with alternating UE support: x 10 minutes with supervision.  -BALANCE: sitting 

edge of bed with posterior UE propping x 15 minutes with supervision.  -BALANCE:

sitting edge of bed with UE support lateral to patient x 5 minutes with min A.  

-BALANCE: standing tolerance in standing frame for 10 minutes with stable 

vitals.  -BALANCE: static standing with max A of 1 and mod A of 2nd person for 

safety x 30 seconds.  -WHEELCHAIR: propel manual wheelchair x 200 feet on level 

surface with BUE with supervision.  -WHEELCHAIR: manage brakes & seatbelt on 

manual WC with supervision without cueing.  -WHEELCHAIR: perform pressure relief

with cueing with min A and assistance to ensure stability of wheelchair.  -

EDUCATION: care-taker to assist patient with bed/mat mobility and positioning 

with contact guard assistance from therapist





- Provider


Physical Therapist:: Elvia Bautista


License Number:: [id number]





Occupational Therapy





- Arousal/Attention/Orientation


Level of Consciousness: Awake, Alert


Patient Orientation: Person, Place, Time, Appropriate to Age, Appropriate to 

Situation





- ADL/IADL


Self Feeding: Independent, Set-up Help


Grooming: Independent, Set-up Help


Dressing-Upper Ext: Supervision, Verbal Cues, Set-up Help


Dressing-Lower Ext: Verbal Cues, Set-up Help, Moderate Assistance


Comment: uses assistive devices for lower body dressing--dressing stick & 

reacher





- Sitting Balance


Static Sitting: Contact Guard Assist


Dynamic Sitting: Reaches across midline, Reaches out of base of support, Reaches

within base of support, Minimal Assistance, Moderate Assistance


Comment: seated unsupported





- Transfers


Wheelchair to Bed Transfers: Verbal Cues, Set-up Help, Contact Guard, Minimal 

Assistance


Comment: commode transfers TBA whn safe, spasms under control





- Wheelchair Management


Level of Assistance: Supervision, Verbal Cues, Set-up Help


Distance (ft.): 75





- Upper Extremity Status


Right Upper Extremity Comment: AROM is WNLS, 4/5


Left Upper Extremity Comment: ROMis WNLS, 4/5





- Pain


Pain (assessed during therapy session): 5


Alleviating Techniques: Position Change, Distraction, Inactivity


Comment: cervical region, upper back





- Insight/Carryover


Insight/Carryover: Good





- Patient/Family Education


Comment: -rehab/OT goals, limitations & plan of care.  -self care, 

transfers/mobility training uisng adaptive/compensatory strategies.  -w/c 

propulsion/management.  -pressure relief techniques in w/c & bed.  -adaptive 

devices: reacher, dressing for lower body dressing, leglifter for bed 

mobility/positioning BLES while in bed.  -caregiver education-bed & w/c 

positioning(Lionel 2 cushion).  -skin checks/inspections B heel, sacral area





- Assessment/Plan


Assessment: Pt is a 33 year old R handed male with dx: cervical abcess, 

osteomylelitis s/p C5-C6 and C6-C7 laminectomy.  *Precautions: falls, spinal 

precautions, pressure relief considerations,.  +spasms in BLES & trunk, pressure

relief strategies.  Pt limited by the following impairments:  ++spasms in B LEs 

& trunk.  -impaired BUES  core/trunk strength.  -impaired BLES strength, 

involuntary movements.  -impaired safety.  -impaired activity 

tolerance/endurance--which imapct on functional performance of self care, 

transfers/mobility and IAdls. Pt will continue skilled OT to adddress functional

impairments to maxmize function in self care/mobility using compensatory 

strategies.  Pt will benefit from intensive acute rehab to maximize function in 

self care, transfers/mobility.  Pt seated in 18x18 high back recliner with Lionel 2

cushion, slight recline with seatbelt; pt able to maintain body in w/c with BLES

on floor with intermittent min assist today.  Pt may benefit from  dycem 

palcement to minimize sliding & increase safety/positioning.  Pt presents with 

increased B LES spasms with weightbearing thus slide board transfers safer a 

this time. Pt benefits from inhibitory stretches/weightbearing prior to 

transfers & bed mobility.  Pt continues to demostrate increased overall function

in self care, transfers during today's treatment session.  Pt with increased 

fatigue /sleepiness this pm along with increased trunk, BLES spasms thus more 

sliding in w/c. Pt able to correct posture in w/c--may benfit from dycem on 

cushion to minimize sliding.  Skin on sacral area, BLES/heels continue to be 

intact.  *Goal: Mod I from w/c level for self care, transfers/mobility and Iadls

with assistive devices & Caregiver to be I assisting pt as needed with adls, 

functional transfers/mobility





- Goals


Timeframe: 8 days


Comment: *UPPER BODY DRESSING: I/setup.  *LOWER BODY DRESSING: Min assist and 

verbal cues with assistive devices.  *TRANSFERS:w/c<->bed with Supervision/CS 

and verbal cues setup of slide board; min assist slide board to commode OR mod 

assist lateral transfers.  *BED MOBILITY: supine->sit with Close S & verbal cues

with bedrails, sit->supine with min assist and verbal cues with bedrails, 

leglifter.  *BATHING: moderate assist and verbal cues--sponge bathe.  *STATIC 

SItTING: Mod I with BUE support, DYNAMIC with min displacement Bilaterally, 

anteriorly/posteriorly with min assist and verbal cues--compensatory strategies.

 *TOILETING: Mod assist & verbal cues.  *PRESSURE RELIEF COMPLETION WITH CS and 

verbal cues while in bed and/or w/c.  *W/C PROPULSION/MANAGEMENT: 150 feet with 

Andrzej, manage B brakes with Mod I





- Provider


Occupational Therapist:: Yuliya Cruz


License Number: [id number]





Recreational Therapy





- Participation


Participation: Participates in Individual and/or Group Sessions, Monitors 

His/Her Own Leisure Time





- Attendance


Attendance: 3-5 times per week





- Activities


Leisure Activities: Cards and Games





- Socialization


Level of Socialization: Initiates/interacts freely with care givers and peer





- Diversional Time


Diversional Time: likes to watch television, play video games





- Assessment


Assessment/Plan: Pt was oriented to benefits and purpose of participating in 

recreation therapy sessions throughout stay on unit. Pt verbalized understanding

and agreement to participate in sessions throughout stay on unit. Encouraged pt 

to stay out of bed longer throughout the day for pt to participate in recreation

therapy sessions. Pt would benefit from participating in sessions to improve 

diversion from pain, improve activity tolerance level, and improve leisure 

awareness level. Pt will benefit from leisure education for leisure alternatives

at home. Pt with hx of cannabis use as per PMH.


Problems Currently Limiting Participation: spinal precautions, neck collar brace

to be worn at all times, B/L LE weakness, decrease leisure awareness level, 

decrease leisure awareness level, decrease activity tolerance level


Goals and Time Frame: Pt will be encouraged to participate in 1:1 and group 

recreation therapy sessions in wheelchair or at bedside 3-5x week to improve 

activity tolerance level, leisure awareness level, diversion, and attention to 

task by date of discharge.





- Provider


Therapist: Gisela Garza





Nutrition





- Current Diet


Current Diet/Supplement/Feedings: Regular Diet





- Appetite


Percent Meal Consumed: %





- Comments


Comments: Pt reports constipation x 1 week.





- Assessment/Goals/Time Frame


Assessments/Goals/Time Frame: Seen 12/3, planned follow up 12/10.  Goals: 1) Pt 

to consume at least 75% meals without GI upset x 5-7 days.  2) Pt to have 

resolution of constipation by follow up.





- Provider


Provider: Kirby Jo





Case Management





- Psychosocial Assessment


Support Systems: Julietperri Younger (significant other)- [phone]


Psychological Interventions/Needs: Patient is AAOx3 and able to verbalize needs.


Discharge Concerns: Patient has 12 steps to navigate to enter his building. 

Patient is unemployed.


Patient/Family Meeting: CM met with patient and rehab team.


Intervention/Goal/Outcome: 1. Goal: Contact guard 2. Plan: meet with team to 

discuss 3. DME needs 4. schedule f/u appointments 5. continued emotional support

6. continued authorization





- Discharge Plan


Discharge Plan: Home with significant other/family


Home Services: [facility]?





- Provider


Provider: Aranza Rosenthal


License Number: [id number]





Rehabilitation Plan





- Treatment Plan


Treatment Plan: Physical Therapy, Occupational Therapy, Dietary, Patient/Family 

Education





- Discharge Plan


Discharge to: Home

## 2018-12-04 NOTE — CP.PCM.PN
Subjective





- Date & Time of Evaluation


Date of Evaluation: 12/04/18


Time of Evaluation: 12:59





- Subjective


Subjective: 





Neurology Consultation Follow-Up Note:





Mr. Ken was evaluated this afternoon sitting OOB in a chair with family at 

bedside. Today pt is in good spirits, smiling and laughing.  He states that he 

was able to stand up today with PT.  Pt admits that his neck pain has resolved. 

He admits that he still has left hand and fingers tingling/numbness.  Pt denies 

headache, dizziness, visual changes, chest pain, shortness of breath, abd pain, 

n/v/d.  








Objective





- Vital Signs/Intake and Output


Vital Signs (last 24 hours): 


                                        











Temp Pulse Resp BP Pulse Ox


 


 98.1 F   51 L  20   128/57 L  98 


 


 12/04/18 10:00  12/04/18 10:00  12/04/18 10:00  12/04/18 10:00  12/04/18 10:00








Intake and Output: 


                                        











 12/04/18 12/04/18





 06:59 18:59


 


Intake Total 880 


 


Output Total 1600 


 


Balance -720 














- Medications


Medications: 


                               Current Medications





Acetaminophen (Tylenol 325mg Tab)  650 mg PO Q6 PRN


   PRN Reason: for pain scale  3-4 


Baclofen (Lioresal)  5 mg PO TID [facility]


   Last Admin: 12/04/18 12:51 Dose:  5 mg


Bisacodyl (Dulcolax)  10 mg [place] DAILY PRN


   PRN Reason: Constipation


   Last Admin: 12/04/18 06:15 Dose:  10 mg


Dexamethasone (Decadron Inj)  4 mg IVP Q6 [facility]


   Last Admin: 12/04/18 12:47 Dose:  4 mg


Docusate Sodium (Colace)  100 mg PO BID [facility]


   Last Admin: 12/04/18 08:09 Dose:  100 mg


Gabapentin (Neurontin)  300 mg PO HS [facility]


   Last Admin: 12/03/18 21:43 Dose:  300 mg


Cefepime HCl 2 gm/ Sodium (Chloride)  100 mls @ 100 mls/hr IVPB 

Q8@0600,1400,2200 [facility]


   Last Admin: 12/04/18 06:17 Dose:  100 mls/hr


Vancomycin HCl 1 gm/ Sodium (Chloride)  250 mls @ 166.667 mls/hr IVPB Q12H [facility]


   Last Admin: 12/04/18 12:47 Dose:  166.667 mls/hr


Lactulose (Enulose)  20 gm PO DAILY PRN


   PRN Reason: Constipation


   Last Admin: 12/03/18 12:35 Dose:  20 gm


Oxycodone HCl (Oxycodone Immediate Release Tab)  5 mg PO Q6 PRN


   PRN Reason: pain 5-10/10


   Last Admin: 12/03/18 11:25 Dose:  5 mg











- Labs


Labs: 


                                        





                                 12/03/18 05:35 





                                 12/03/18 05:35 











- Constitutional


Appears: Well, Non-toxic, No Acute Distress





- Head Exam


Head Exam: ATRAUMATIC, NORMAL INSPECTION, NORMOCEPHALIC





- Eye Exam


Eye Exam: EOMI, Normal appearance, PERRL


Pupil Exam: NORMAL ACCOMODATION, PERRL





- ENT Exam


ENT Exam: Mucous Membranes Moist





- Neck Exam


Additional comments: 





miami j-collar maintained.





- Respiratory Exam


Respiratory Exam: NORMAL BREATHING PATTERN





- Extremities Exam


Extremities Exam: absent: Calf Tenderness, Pedal Edema





- Neurological Exam


Neurological Exam: Alert, Awake, Oriented x3


Neuro motor strength exam: Left Upper Extremity: 5 ( 4/5), Right Upper 

Extremity: 5 ( 4/5), Left Lower Extremity: 3 (hypertonicity still present 

but less than yesterday), Right Lower Extremity: 3 (hypertonicity still present 

but less than yesterday)


Additional comments: 


Speech clear and fluid.


Sensation intact and equal BLE and BUE.  Still slightly decreased over T12, 

which he had upon admission.


Vibration intact BUE.


Hyperflexia BLE noted; hypertonicity is still present but less/improved compared

to yesterday.





- Psychiatric Exam


Psychiatric exam: Normal Affect, Normal Mood





- Skin


Skin Exam: Normal Color





Assessment and Plan


(1) Epidural abscess


Assessment & Plan: 


-Mr. Ken is improving and coming along well.


-Paresthesias to left hand and fingers likely neuropathic---continue Neurontin 

300 mg PO HS.


-Continue PT/OT therapy.


-Decadron taper recommendations:


  Decardon 4mg IV Q8 x2 days (started today, last day to be given is on 

12/6/18), followed by Decadron 4 mg IV Q12 x2 days (start on 12/7/18; last day 

to  


  be given is on 12/9/18), followed by Decadron 4 mg IV daily x2 days (start on 

12/9/18, last day to be given is on 12/11/18), followed by Decadron 2 mg IV 


  daily x2 days (start on 12/12/18, last day to be given is 12/14/18).


-Please notify neuro team of any acute changes in condition.


-We will continue to follow the pt while in rehab.





Case discussed with Dr. Houston.


Status: Acute

## 2018-12-04 NOTE — CP.PCM.CON
History of Present Illness





- History of Present Illness


History of Present Illness: 


consult requested for depression


33 yr old male admitted from the icu following therapy for epidural abscess--s/p

neurosurgery intervention.He had paralysis of lower extremities which has 

improved with surgery.


on reviewing the chart pt has history of cannabis abuse


pt on evaluation denied any current symptoms of depression and declined to be 

interviewed by psychiatry 








Past Patient History





- Past Medical History & Family History


Past Medical History?: Yes





- Past Social History


Smoking Status: Current Some Days Smoker


Alcohol: < 2 Drinks/Day


Drugs: Cannabis


Home Situation {Lives}: With Family





- CARDIAC


Hx Cardiac Disorders: No





- PULMONARY


Hx Respiratory Disorders: No





- HEMATOLOGICAL/ONCOLOGICAL


Hx AIDS: No


Hx Human Immunodeficiency Virus (HIV): No





- MUSCULOSKELETAL/RHEUMATOLOGICAL


Hx Falls: Yes (few days ago)





- PSYCHIATRIC


Hx Substance Use: Yes (claimed smoke Marijuana)





- SURGICAL HISTORY


Hx Surgeries: Yes


Other/Comment: hernia surgery





- ANESTHESIA


Hx Anesthesia: Yes


Hx Anesthesia Reactions: No


Hx Malignant Hyperthermia: No





Meds


Allergies/Adverse Reactions: 


                                    Allergies











Allergy/AdvReac Type Severity Reaction Status Date / Time


 


No Known Allergies Allergy   Verified 11/30/18 15:54














- Medications


Medications: 


                               Current Medications





Acetaminophen (Tylenol 325mg Tab)  650 mg PO Q6 PRN


   PRN Reason: for pain scale  3-4 


Baclofen (Lioresal)  5 mg PO TID [facility]


   Last Admin: 12/04/18 08:10 Dose:  5 mg


Bisacodyl (Dulcolax)  10 mg [place] DAILY PRN


   PRN Reason: Constipation


   Last Admin: 12/04/18 06:15 Dose:  10 mg


Dexamethasone (Decadron Inj)  4 mg IVP Q6 [facility]


   Last Admin: 12/04/18 06:14 Dose:  4 mg


Docusate Sodium (Colace)  100 mg PO BID [facility]


   Last Admin: 12/04/18 08:09 Dose:  100 mg


Gabapentin (Neurontin)  300 mg PO HS [facility]


   Last Admin: 12/03/18 21:43 Dose:  300 mg


Cefepime HCl 2 gm/ Sodium (Chloride)  100 mls @ 100 mls/hr IVPB 

Q8@0600,1400,2200 [facility]


   Last Admin: 12/04/18 06:17 Dose:  100 mls/hr


Vancomycin HCl 1 gm/ Sodium (Chloride)  250 mls @ 166.667 mls/hr IVPB Q12H [facility]


   Last Admin: 12/04/18 00:20 Dose:  166.667 mls/hr


Lactulose (Enulose)  20 gm PO DAILY PRN


   PRN Reason: Constipation


   Last Admin: 12/03/18 12:35 Dose:  20 gm


Oxycodone HCl (Oxycodone Immediate Release Tab)  5 mg PO Q6 PRN


   PRN Reason: pain 5-10/10


   Last Admin: 12/03/18 11:25 Dose:  5 mg











Results





- Vital Signs


Recent Vital Signs: 


                                Last Vital Signs











Temp  98.1 F   12/04/18 08:40


 


Pulse  51 L  12/04/18 08:40


 


Resp  20   12/04/18 08:40


 


BP  128/57 L  12/04/18 08:40


 


Pulse Ox  97   12/03/18 21:00














- Labs


Result Diagrams: 


                                 12/03/18 05:35





                                 12/03/18 05:35


Labs: 


                         Laboratory Results - last 24 hr











  12/03/18 12/03/18 12/03/18





  05:35 05:35 12:22


 


Neutrophils % (Manual)  89 H  


 


Lymphocytes % (Manual)  6 L  


 


Monocytes % (Manual)  5  


 


Platelet Estimate  Slightly increased H  


 


RBC Morphology  Normal  


 


C-Reactive Protein   5.50 


 


Vancomycin Trough    5.4

## 2018-12-05 NOTE — CP.PCM.PN
Subjective





- Date & Time of Evaluation


Date of Evaluation: 12/05/18


Time of Evaluation: 11:09





- Subjective


Subjective: 





CLINICALLY IMPROVING


NO NEW CLINICAL FINDINGS


WILL CONTINUE RX AS ORDERED


WILL REPEAT LABS





Objective





- Vital Signs/Intake and Output


Vital Signs (last 24 hours): 


                                        











Temp Pulse Resp BP Pulse Ox


 


 97.2 F L  64   19   120/67   98 


 


 12/05/18 08:03  12/05/18 08:03  12/05/18 08:03  12/05/18 08:03  12/05/18 08:03








Intake and Output: 


                                        











 12/05/18 12/05/18





 06:59 18:59


 


Intake Total 950 


 


Output Total 1600 


 


Balance -650 














- Medications


Medications: 


                               Current Medications





Acetaminophen (Tylenol 325mg Tab)  650 mg PO Q6 PRN


   PRN Reason: for pain scale  3-4 


Baclofen (Lioresal)  10 mg PO TID [facility]


   Last Admin: 12/05/18 08:46 Dose:  10 mg


Bisacodyl (Dulcolax)  10 mg [place] DAILY PRN


   PRN Reason: Constipation


   Last Admin: 12/04/18 06:15 Dose:  10 mg


Dexamethasone (Decadron)  4 mg PO Q8 [facility]


   Last Admin: 12/05/18 05:30 Dose:  4 mg


Docusate Sodium (Colace)  100 mg PO BID [facility]


   Last Admin: 12/05/18 08:46 Dose:  100 mg


Gabapentin (Neurontin)  300 mg PO HS [facility]


   Last Admin: 12/04/18 21:27 Dose:  300 mg


Cefepime HCl 2 gm/ Sodium (Chloride)  100 mls @ 100 mls/hr IVPB 

Q8@0600,1400,2200 [facility]


   Last Admin: 12/05/18 05:27 Dose:  100 mls/hr


Vancomycin HCl 1 gm/ Sodium (Chloride)  250 mls @ 166.667 mls/hr IVPB Q12H [facility]


   Last Admin: 12/05/18 00:15 Dose:  166.667 mls/hr


Lactulose (Enulose)  20 gm PO DAILY PRN


   PRN Reason: Constipation


   Last Admin: 12/04/18 17:11 Dose:  20 gm


Oxycodone HCl (Oxycodone Immediate Release Tab)  5 mg PO Q6 PRN


   PRN Reason: pain 5-10/10


   Last Admin: 12/03/18 11:25 Dose:  5 mg


Tizanidine HCl (Zanaflex)  4 mg PO HS [facility]


   Last Admin: 12/04/18 21:27 Dose:  4 mg











- Labs


Labs: 


                                        





                                 12/03/18 05:35 





                                 12/03/18 05:35

## 2018-12-05 NOTE — CP.PCM.PN
Subjective





- Date & Time of Evaluation


Date of Evaluation: 12/05/18


Time of Evaluation: 07:00





- Subjective


Subjective: 





afebrile on IV antibiotics


still wheel chair bound but legs are stronger





Objective





- Vital Signs/Intake and Output


Vital Signs (last 24 hours): 


                                        











Temp Pulse Resp BP Pulse Ox


 


 97.2 F L  64   19   120/67   98 


 


 12/05/18 08:03  12/05/18 08:03  12/05/18 08:03  12/05/18 08:03  12/05/18 08:03








Intake and Output: 


                                        











 12/05/18 12/05/18





 06:59 18:59


 


Intake Total 950 


 


Output Total 1600 


 


Balance -650 














- Medications


Medications: 


                               Current Medications





Acetaminophen (Tylenol 325mg Tab)  650 mg PO Q6 PRN


   PRN Reason: for pain scale  3-4 


Baclofen (Lioresal)  10 mg PO TID [facility]


   Last Admin: 12/05/18 08:46 Dose:  10 mg


Bisacodyl (Dulcolax)  10 mg [place] DAILY PRN


   PRN Reason: Constipation


   Last Admin: 12/04/18 06:15 Dose:  10 mg


Dexamethasone (Decadron)  4 mg PO Q8 [facility]


   Last Admin: 12/05/18 05:30 Dose:  4 mg


Docusate Sodium (Colace)  100 mg PO BID [facility]


   Last Admin: 12/05/18 08:46 Dose:  100 mg


Gabapentin (Neurontin)  300 mg PO HS [facility]


   Last Admin: 12/04/18 21:27 Dose:  300 mg


Cefepime HCl 2 gm/ Sodium (Chloride)  100 mls @ 100 mls/hr IVPB 

Q8@0600,1400,2200 [facility]


   Last Admin: 12/05/18 05:27 Dose:  100 mls/hr


Vancomycin HCl 1 gm/ Sodium (Chloride)  250 mls @ 166.667 mls/hr IVPB Q12H [facility]


   Last Admin: 12/05/18 00:15 Dose:  166.667 mls/hr


Lactulose (Enulose)  20 gm PO DAILY PRN


   PRN Reason: Constipation


   Last Admin: 12/04/18 17:11 Dose:  20 gm


Oxycodone HCl (Oxycodone Immediate Release Tab)  5 mg PO Q6 PRN


   PRN Reason: pain 5-10/10


   Last Admin: 12/03/18 11:25 Dose:  5 mg


Tizanidine HCl (Zanaflex)  4 mg PO HS [facility]


   Last Admin: 12/04/18 21:27 Dose:  4 mg











- Labs


Labs: 


                                        





                                 12/03/18 05:35 





                                 12/03/18 05:35 











- Constitutional


Appears: Non-toxic, Chronically Ill





- Head Exam


Head Exam: NORMOCEPHALIC





- Eye Exam


Eye Exam: absent: Scleral icterus





- ENT Exam


ENT Exam: Mucous Membranes Dry





- Neck Exam


Neck Exam: absent: Lymphadenopathy





- Respiratory Exam


Respiratory Exam: Decreased Breath Sounds





- Cardiovascular Exam


Cardiovascular Exam: REGULAR RHYTHM





- GI/Abdominal Exam


GI & Abdominal Exam: Distended





- Rectal Exam


Rectal Exam: Deferred





-  Exam


 Exam: NORMAL INSPECTION





- Extremities Exam


Extremities Exam: absent: Pedal Edema





- Back Exam


Back Exam: absent: CVA tenderness (L), CVA tenderness (R)





- Neurological Exam


Neurological Exam: Alert, Awake, CN II-XII Intact, Motor Sensory Deficit, 

Oriented x3.  absent: Normal Gait


Neuro motor strength exam: Left Upper Extremity: 4, Right Upper Extremity: 4, 

Left Lower Extremity: 3, Right Lower Extremity: 3





- Psychiatric Exam


Psychiatric exam: Depressed





- Skin


Skin Exam: Dry, Intact





Assessment and Plan


(1) Back pain


Status: Acute   





(2) Discitis of cervical region


Status: Acute   





(3) Epidural abscess


Status: Acute   





(4) Osteomyelitis of cervical spine


Status: Acute   





- Assessment and Plan (Free Text)


Assessment: 





will renew IV antibiotics


will need 6-8 weeks IV rx, follow up imaging


Cont aggressive PT

## 2018-12-05 NOTE — CP.PCM.CON
History of Present Illness





- History of Present Illness


History of Present Illness: 


Podiatry consult note for Dr. Day





33 yr old male was seen and evaluated at bedside for elongated, dystrophic 

toenails X 10. Patient was resting comfortably in bed, denied any pain to his 

feet and was in NAD. Patient was admitted from the icu for therapy for epidural 

abscess--s/p neurosurgery intervention. He had paralysis of lower extremities. 

Patient still reports numbness to bilaterally lower extremities with muscle 

spasms, that he states he is unable control. 








Review of Systems





- Review of Systems


All systems: reviewed and no additional remarkable complaints except


Review of Systems: 





As per HPI





Past Patient History





- Past Medical History & Family History


Past Medical History?: Yes





- Past Social History


Smoking Status: Current Some Days Smoker


Alcohol: < 2 Drinks/Day


Drugs: Cannabis


Home Situation {Lives}: With Family





- CARDIAC


Hx Cardiac Disorders: No





- PULMONARY


Hx Respiratory Disorders: No





- HEMATOLOGICAL/ONCOLOGICAL


Hx AIDS: No


Hx Human Immunodeficiency Virus (HIV): No





- MUSCULOSKELETAL/RHEUMATOLOGICAL


Hx Falls: Yes (few days ago)





- PSYCHIATRIC


Hx Substance Use: Yes (claimed smoke Marijuana)





- SURGICAL HISTORY


Hx Surgeries: Yes


Other/Comment: hernia surgery





- ANESTHESIA


Hx Anesthesia: Yes


Hx Anesthesia Reactions: No


Hx Malignant Hyperthermia: No





Meds


Allergies/Adverse Reactions: 


                                    Allergies











Allergy/AdvReac Type Severity Reaction Status Date / Time


 


No Known Allergies Allergy   Verified 11/30/18 15:54














- Medications


Medications: 


                               Current Medications





Acetaminophen (Tylenol 325mg Tab)  650 mg PO Q6 PRN


   PRN Reason: for pain scale  3-4 


Baclofen (Lioresal)  10 mg PO TID [facility]


   Last Admin: 12/04/18 17:09 Dose:  10 mg


Bisacodyl (Dulcolax)  10 mg [place] DAILY PRN


   PRN Reason: Constipation


   Last Admin: 12/04/18 06:15 Dose:  10 mg


Dexamethasone (Decadron)  4 mg PO Q8 [facility]


   Last Admin: 12/05/18 05:30 Dose:  4 mg


Docusate Sodium (Colace)  100 mg PO BID [facility]


   Last Admin: 12/04/18 17:09 Dose:  100 mg


Gabapentin (Neurontin)  300 mg PO HS [facility]


   Last Admin: 12/04/18 21:27 Dose:  300 mg


Cefepime HCl 2 gm/ Sodium (Chloride)  100 mls @ 100 mls/hr IVPB 

Q8@0600,1400,2200 [facility]


   Last Admin: 12/05/18 05:27 Dose:  100 mls/hr


Vancomycin HCl 1 gm/ Sodium (Chloride)  250 mls @ 166.667 mls/hr IVPB Q12H [facility]


   Last Admin: 12/05/18 00:15 Dose:  166.667 mls/hr


Lactulose (Enulose)  20 gm PO DAILY PRN


   PRN Reason: Constipation


   Last Admin: 12/04/18 17:11 Dose:  20 gm


Oxycodone HCl (Oxycodone Immediate Release Tab)  5 mg PO Q6 PRN


   PRN Reason: pain 5-10/10


   Last Admin: 12/03/18 11:25 Dose:  5 mg


Tizanidine HCl (Zanaflex)  4 mg PO HS [facility]


   Last Admin: 12/04/18 21:27 Dose:  4 mg











Physical Exam





- Constitutional


Appears: Well, Non-toxic, No Acute Distress





- Head Exam


Head Exam: ATRAUMATIC, NORMOCEPHALIC





- Extremities Exam


Additional comments: 


Bilateral Lower Extremity Exam





VASC: DP/PT 2/4 B/L; CFT < 3sec X10; Temperature gradient warm to cool; no 

erythema, no edema, no varicosities. 


NEURO: Diminished sensation B/L.


ORTHO: Contracted digits 2-5 B/L. Mild HAV B/L. Muscular spasms to LE B/L. 


DERM: Diffuse xerosis to anterior tibia, dorsal and plantar foot B/L. 

Hyperkeratosis submet 1 on right and at PIPJ of digit 3 on left. Toe nails dyst

rophic and elongated X10. No open lesions; no clinical signs of infection.











- Neurological Exam


Neurological exam: Alert, Oriented x3





- Psychiatric Exam


Psychiatric exam: Normal Affect, Normal Mood





Results





- Vital Signs


Recent Vital Signs: 


                                Last Vital Signs











Temp  97.2 F L  12/05/18 08:03


 


Pulse  64   12/05/18 08:03


 


Resp  19   12/05/18 08:03


 


BP  120/67   12/05/18 08:03


 


Pulse Ox  98   12/05/18 08:03














- Labs


Result Diagrams: 


                                 12/03/18 05:35





                                 12/03/18 05:35





Assessment & Plan





- Assessment and Plan (Free Text)


Assessment: 





33 y.o male patient seen and evaluated for elongated, dystrophic toenails X 10


Plan: 


Patient seen and evaluated


Plan discussed with Dr. Day


Chart, labs and vitals were reviewed- positive leukocytosis, not from lower 

extremities


Patient toenails and calluses debrided with sterile instruments


Patient tolerated well


Ordered ammonium lactate for xerosis bilaterally


NO other podiatric intervention at this time


Please re consult as needed


Thank you for the consult

## 2018-12-05 NOTE — CP.PCM.PN
Subjective





- Date & Time of Evaluation


Date of Evaluation: 12/05/18


Time of Evaluation: 17:28





- Subjective


Subjective: 





Patient with a much better day in therapy today and slept much better at night.


still with no BM could not hold the enema.


I will order a "magic bullet" supp. will reach out to pharmacy





Objective





- Vital Signs/Intake and Output


Vital Signs (last 24 hours): 


                                        











Temp Pulse Resp BP Pulse Ox


 


 97.2 F L  64   19   120/67   98 


 


 12/05/18 08:03  12/05/18 08:03  12/05/18 08:03  12/05/18 08:03  12/05/18 08:03








Intake and Output: 


                                        











 12/05/18 12/05/18





 06:59 18:59


 


Intake Total 950 1350


 


Output Total 1600 1200


 


Balance -650 150














- Medications


Medications: 


                               Current Medications





Acetaminophen (Tylenol 325mg Tab)  650 mg PO Q6 PRN


   PRN Reason: for pain scale  3-4 


Baclofen (Lioresal)  10 mg PO TID [facility]


   Last Admin: 12/05/18 17:00 Dose:  10 mg


Bisacodyl (Dulcolax)  10 mg [place] DAILY PRN


   PRN Reason: Constipation


   Last Admin: 12/04/18 06:15 Dose:  10 mg


Dexamethasone (Decadron)  4 mg PO Q8 [facility]


   Last Admin: 12/05/18 14:18 Dose:  4 mg


Docusate Sodium (Colace)  100 mg PO BID [facility]


   Last Admin: 12/05/18 17:00 Dose:  100 mg


Gabapentin (Neurontin)  300 mg PO HS [facility]


   Last Admin: 12/04/18 21:27 Dose:  300 mg


Cefepime HCl 2 gm/ Sodium (Chloride)  100 mls @ 100 mls/hr IVPB 

Q8@0600,1400,2200 [facility]


   Last Admin: 12/05/18 14:15 Dose:  100 mls/hr


Vancomycin HCl 1 gm/ Sodium (Chloride)  250 mls @ 166.667 mls/hr IVPB Q12H [facility]


   Last Admin: 12/05/18 12:25 Dose:  166.667 mls/hr


Lactulose (Enulose)  20 gm PO DAILY PRN


   PRN Reason: Constipation


   Last Admin: 12/05/18 12:24 Dose:  20 gm


Magnesium Citrate (Citrate Of Mag)  150 ml PO ONCE ONE


   Stop: 12/05/18 17:24


Magnesium Citrate (Citrate Of Mag)  150 ml PO ONCE ONE


   Stop: 12/06/18 12:01


Oxycodone HCl (Oxycodone Immediate Release Tab)  5 mg PO Q6 PRN


   PRN Reason: pain 5-10/10


   Last Admin: 12/03/18 11:25 Dose:  5 mg


Tizanidine HCl (Zanaflex)  4 mg PO HS [facility]


   Last Admin: 12/04/18 21:27 Dose:  4 mg











- Labs


Labs: 


                                        





                                 12/03/18 05:35 





                                 12/03/18 05:35

## 2018-12-06 LAB
BASOPHILS # BLD AUTO: 0 K/UL (ref 0–0.2)
BASOPHILS NFR BLD: 0.1 % (ref 0–2)
EOSINOPHIL # BLD AUTO: 0 K/UL (ref 0–0.7)
EOSINOPHIL NFR BLD: 0.1 % (ref 0–4)
ERYTHROCYTE [DISTWIDTH] IN BLOOD BY AUTOMATED COUNT: 13.1 % (ref 11.5–14.5)
HGB BLD-MCNC: 11.4 G/DL (ref 12–18)
LYMPHOCYTES # BLD AUTO: 0.9 K/UL (ref 1–4.3)
LYMPHOCYTES NFR BLD AUTO: 5.1 % (ref 20–40)
MCH RBC QN AUTO: 30.6 PG (ref 27–31)
MCHC RBC AUTO-ENTMCNC: 32.3 G/DL (ref 33–37)
MCV RBC AUTO: 94.7 FL (ref 80–94)
MONOCYTES # BLD: 1.2 K/UL (ref 0–0.8)
MONOCYTES NFR BLD: 7.4 % (ref 0–10)
NEUTROPHILS # BLD: 14.5 K/UL (ref 1.8–7)
NEUTROPHILS NFR BLD AUTO: 87.3 % (ref 50–75)
NRBC BLD AUTO-RTO: 0.1 % (ref 0–0)
PLATELET # BLD: 371 K/UL (ref 130–400)
PMV BLD AUTO: 7.6 FL (ref 7.2–11.7)
RBC # BLD AUTO: 3.73 MIL/UL (ref 4.4–5.9)
WBC # BLD AUTO: 16.6 K/UL (ref 4.8–10.8)

## 2018-12-06 NOTE — CP.PCM.PN
Subjective





- Date & Time of Evaluation


Date of Evaluation: 12/06/18


Time of Evaluation: 09:33





- Subjective


Subjective: 





NO COMPLAINTS EXCEPT FOR CONSTIPATION





Objective





- Vital Signs/Intake and Output


Vital Signs (last 24 hours): 


                                        











Temp Pulse Resp BP Pulse Ox


 


 97.3 F L  55 L  20   126/72   97 


 


 12/06/18 08:32  12/06/18 08:32  12/06/18 08:32  12/06/18 08:32  12/06/18 08:32








Intake and Output: 


                                        











 12/06/18 12/06/18





 06:59 18:59


 


Intake Total 930 


 


Output Total 1600 


 


Balance -670 














- Medications


Medications: 


                               Current Medications





Acetaminophen (Tylenol 325mg Tab)  650 mg PO Q6 PRN


   PRN Reason: for pain scale  3-4 


Baclofen (Lioresal)  10 mg PO TID [facility]


   Last Admin: 12/05/18 17:00 Dose:  10 mg


Bisacodyl (Dulcolax)  10 mg [place] DAILY PRN


   PRN Reason: Constipation


   Last Admin: 12/04/18 06:15 Dose:  10 mg


Dexamethasone (Decadron)  4 mg PO Q8 [facility]


   Last Admin: 12/06/18 06:04 Dose:  4 mg


Docusate Sodium (Colace)  100 mg PO BID [facility]


   Last Admin: 12/05/18 17:00 Dose:  100 mg


Gabapentin (Neurontin)  300 mg PO HS [facility]


   Last Admin: 12/05/18 21:59 Dose:  300 mg


Cefepime HCl 2 gm/ Sodium (Chloride)  100 mls @ 100 mls/hr IVPB 

Q8@0600,1400,2200 [facility]


   Last Admin: 12/06/18 06:05 Dose:  100 mls/hr


Vancomycin HCl 1 gm/ Sodium (Chloride)  250 mls @ 166.667 mls/hr IVPB Q12H [facility]


   Last Admin: 12/06/18 01:00 Dose:  166.667 mls/hr


Lactic Acid (Lac-Hydrin 12% Lotion (225 G))  1 applic TOP 0600,1800 [facility]


   Last Admin: 12/06/18 06:06 Dose:  1 applic


Lactulose (Enulose)  20 gm PO DAILY PRN


   PRN Reason: Constipation


   Last Admin: 12/05/18 12:24 Dose:  20 gm


Magnesium Citrate (Citrate Of Mag)  150 ml PO ONCE ONE


   Stop: 12/06/18 12:01


Oxycodone HCl (Oxycodone Immediate Release Tab)  5 mg PO Q6 PRN


   PRN Reason: pain 5-10/10


   Last Admin: 12/03/18 11:25 Dose:  5 mg


Tizanidine HCl (Zanaflex)  4 mg PO HS [facility]


   Last Admin: 12/05/18 21:59 Dose:  4 mg











- Labs


Labs: 


                                        





                                 12/06/18 05:15 





                                 12/03/18 05:35 











- Constitutional


Appears: No Acute Distress





- Head Exam


Head Exam: ATRAUMATIC, NORMAL INSPECTION, NORMOCEPHALIC





- Eye Exam


Eye Exam: EOMI, Normal appearance, PERRL


Pupil Exam: NORMAL ACCOMODATION, PERRL





- ENT Exam


ENT Exam: Mucous Membranes Moist, Normal Exam





- Neck Exam


Neck Exam: Full ROM, Normal Inspection.  absent: Lymphadenopathy





- Respiratory Exam


Respiratory Exam: Clear to Ausculation Bilateral, NORMAL BREATHING PATTERN





- Cardiovascular Exam


Cardiovascular Exam: REGULAR RHYTHM, +S1, +S2.  absent: Murmur





- GI/Abdominal Exam


GI & Abdominal Exam: Soft, Normal Bowel Sounds.  absent: Tenderness





- Rectal Exam


Rectal Exam: NORMAL INSPECTION





- Extremities Exam


Extremities Exam: Full ROM, Normal Capillary Refill, Normal Inspection.  absent:

Joint Swelling, Pedal Edema





- Back Exam


Back Exam: NORMAL INSPECTION





- Neurological Exam


Neurological Exam: Alert, Awake, CN II-XII Intact, Oriented x3





- Psychiatric Exam


Psychiatric exam: Normal Affect, Normal Mood





- Skin


Skin Exam: Dry, Intact, Normal Color, Warm





Assessment and Plan





- Assessment and Plan (Free Text)


Assessment: 





DISCITIS


S/P SPINE SURGERY


CONSTIPATION


Plan: 





LAXATIVES


CONTINUE PRESENT RX

## 2018-12-06 NOTE — CP.PCM.PN
Subjective





- Date & Time of Evaluation


Date of Evaluation: 12/06/18


Time of Evaluation: 16:39





- Subjective


Subjective: 





Patient seen in rec room


in good spirits


discussed at length with PT and having issues with flexor tone


On Baclofen 10mg tid 


I will look to increase this as well tomorrow


didn't sleep


increasing the zanaflex to 8mg qhs





Objective





- Vital Signs/Intake and Output


Vital Signs (last 24 hours): 


                                        











Temp Pulse Resp BP Pulse Ox


 


 97.3 F L  55 L  20   126/72   97 


 


 12/06/18 08:32  12/06/18 08:32  12/06/18 08:32  12/06/18 08:32  12/06/18 08:32








Intake and Output: 


                                        











 12/06/18 12/06/18





 06:59 18:59


 


Intake Total 930 


 


Output Total 1600 


 


Balance -670 














- Medications


Medications: 


                               Current Medications





Acetaminophen (Tylenol 325mg Tab)  650 mg PO Q6 PRN


   PRN Reason: for pain scale  3-4 


Baclofen (Lioresal)  10 mg PO TID [facility]


   Last Admin: 12/06/18 12:56 Dose:  10 mg


Bisacodyl (Dulcolax)  10 mg [place] DAILY PRN


   PRN Reason: Constipation


   Last Admin: 12/04/18 06:15 Dose:  10 mg


Dexamethasone (Decadron)  4 mg PO Q8 [facility]


   Last Admin: 12/06/18 13:06 Dose:  4 mg


Docusate Sodium (Colace)  100 mg PO BID [facility]


   Last Admin: 12/06/18 09:00 Dose:  100 mg


Gabapentin (Neurontin)  300 mg PO HS [facility]


   Last Admin: 12/05/18 21:59 Dose:  300 mg


Cefepime HCl 2 gm/ Sodium (Chloride)  100 mls @ 100 mls/hr IVPB Q8@06

00,1400,2200 [facility]


   Last Admin: 12/06/18 14:31 Dose:  100 mls/hr


Vancomycin HCl 1 gm/ Sodium (Chloride)  250 mls @ 166.667 mls/hr IVPB Q12H [facility]


   Last Admin: 12/06/18 12:57 Dose:  166.667 mls/hr


Lactic Acid (Lac-Hydrin 12% Lotion (225 G))  1 applic TOP 0600,1800 [facility]


   Last Admin: 12/06/18 06:06 Dose:  1 applic


Lactulose (Enulose)  20 gm PO DAILY PRN


   PRN Reason: Constipation


   Last Admin: 12/05/18 12:24 Dose:  20 gm


Oxycodone HCl (Oxycodone Immediate Release Tab)  5 mg PO Q6 PRN


   PRN Reason: pain 5-10/10


   Last Admin: 12/03/18 11:25 Dose:  5 mg











- Labs


Labs: 


                                        





                                 12/06/18 05:15 





                                 12/03/18 05:35

## 2018-12-07 NOTE — CP.PCM.PN
Subjective





- Date & Time of Evaluation


Date of Evaluation: 12/07/18


Time of Evaluation: 17:56





- Subjective


Subjective: 





Patient seen in the room


had a good night sleep with the Zanaflex 8mg


very happy


had a BM as well


may have contributed to a decrease in tone today


did well in therapies and documented improvement with longer and better standing


continue current care





Objective





- Vital Signs/Intake and Output


Vital Signs (last 24 hours): 


                                        











Temp Pulse Resp BP Pulse Ox


 


 98 F   82   20   140/80   98 


 


 12/07/18 08:00  12/07/18 08:00  12/07/18 08:00  12/07/18 08:00  12/07/18 08:00








Intake and Output: 


                                        











 12/07/18 12/07/18





 06:59 18:59


 


Intake Total 950 950


 


Output Total 2400 800


 


Balance -1450 150














- Medications


Medications: 


                               Current Medications





Acetaminophen (Tylenol 325mg Tab)  650 mg PO Q6 PRN


   PRN Reason: for pain scale 3-4


Aripiprazole (Abilify)  5 mg PO HS [facility]


Baclofen (Lioresal)  10 mg PO TID [facility]


   Last Admin: 12/07/18 13:41 Dose:  10 mg


Bisacodyl (Dulcolax)  10 mg [place] DAILY PRN


   PRN Reason: Constipation


   Last Admin: 12/04/18 06:15 Dose:  10 mg


Dexamethasone (Decadron)  4 mg PO Q12 [facility]


   Stop: 12/09/18 18:01


Dexamethasone (Decadron)  4 mg PO DAILY [facility]


   Stop: 12/12/18 09:01


Dexamethasone (Decadron)  2 mg PO DAILY [facility]


   Stop: 12/15/18 09:01


Docusate Sodium (Colace)  100 mg PO BID [facility]


   Last Admin: 12/07/18 09:13 Dose:  100 mg


Gabapentin (Neurontin)  600 mg PO HS [facility]


Cefepime HCl 2 gm/ Sodium (Chloride)  100 mls @ 100 mls/hr IVPB 

Q8@0600,1400,2200 [facility]


   Last Admin: 12/07/18 15:22 Dose:  100 mls/hr


Vancomycin HCl 1 gm/ Sodium (Chloride)  250 mls @ 166.667 mls/hr IVPB Q12H [facility]


   Last Admin: 12/07/18 13:00 Dose:  166.667 mls/hr


Lactic Acid (Lac-Hydrin 12% Lotion (225 G))  1 applic TOP 0600,1800 [facility]


   Last Admin: 12/07/18 06:47 Dose:  1 applic


Lactulose (Enulose)  20 gm PO DAILY PRN


   PRN Reason: Constipation


   Last Admin: 12/06/18 17:10 Dose:  20 gm


Oxycodone HCl (Oxycodone Immediate Release Tab)  5 mg PO Q6 PRN


   PRN Reason: pain 5-10/10


   Last Admin: 12/03/18 11:25 Dose:  5 mg


Tizanidine HCl (Zanaflex)  8 mg PO HS [facility]


   Last Admin: 12/06/18 21:46 Dose:  8 mg











- Labs


Labs: 


                                        





                                 12/06/18 05:15 





                                 12/03/18 05:35

## 2018-12-07 NOTE — CP.PCM.PN
Subjective





- Date & Time of Evaluation


Date of Evaluation: 12/07/18


Time of Evaluation: 12:14





- Subjective


Subjective: 





Neurology Consultation Follow-Up Note:





Mr. Ken was evaluated this afternoon sitting in chair and participating with 

therapy. He is in good spirits, smiling and laughing.  He admits that his left 

hand paresthesias are improving with the Gabapentin.  However, he now admits to 

same paresthesias to the left foot.   He is requesting Jackson J-collar to be 

removed as his skin feels irritated. Pt denies headache, dizziness, neck pain, 

visual changes, chest pain, shortness of breath, abd pain, n/v/d.





Objective





- Vital Signs/Intake and Output


Vital Signs (last 24 hours): 


                                        











Temp Pulse Resp BP Pulse Ox


 


 98.2 F   66   20   131/78   99 


 


 12/06/18 20:06  12/06/18 20:06  12/06/18 20:06  12/06/18 20:06  12/06/18 20:06








Intake and Output: 


                                        











 12/07/18 12/07/18





 06:59 18:59


 


Intake Total 950 


 


Output Total 2400 


 


Balance -1450 














- Medications


Medications: 


                               Current Medications





Acetaminophen (Tylenol 325mg Tab)  650 mg PO Q6 PRN


   PRN Reason: for pain scale 3-4


Baclofen (Lioresal)  10 mg PO TID [facility]


   Last Admin: 12/07/18 09:15 Dose:  10 mg


Bisacodyl (Dulcolax)  10 mg [place] DAILY PRN


   PRN Reason: Constipation


   Last Admin: 12/04/18 06:15 Dose:  10 mg


Dexamethasone (Decadron)  4 mg PO Q8 [facility]


   Last Admin: 12/07/18 06:12 Dose:  4 mg


Docusate Sodium (Colace)  100 mg PO BID [facility]


   Last Admin: 12/07/18 09:13 Dose:  100 mg


Gabapentin (Neurontin)  300 mg PO HS [facility]


   Last Admin: 12/06/18 21:46 Dose:  300 mg


Cefepime HCl 2 gm/ Sodium (Chloride)  100 mls @ 100 mls/hr IVPB 

Q8@0600,1400,2200 [facility]


   Last Admin: 12/07/18 06:12 Dose:  100 mls/hr


Vancomycin HCl 1 gm/ Sodium (Chloride)  250 mls @ 166.667 mls/hr IVPB Q12H [facility]


   Last Admin: 12/07/18 01:04 Dose:  166.667 mls/hr


Lactic Acid (Lac-Hydrin 12% Lotion (225 G))  1 applic TOP 0600,1800 [facility]


   Last Admin: 12/07/18 06:47 Dose:  1 applic


Lactulose (Enulose)  20 gm PO DAILY PRN


   PRN Reason: Constipation


   Last Admin: 12/06/18 17:10 Dose:  20 gm


Oxycodone HCl (Oxycodone Immediate Release Tab)  5 mg PO Q6 PRN


   PRN Reason: pain 5-10/10


   Last Admin: 12/03/18 11:25 Dose:  5 mg


Tizanidine HCl (Zanaflex)  8 mg PO HS [facility]


   Last Admin: 12/06/18 21:46 Dose:  8 mg











- Labs


Labs: 


                                        





                                 12/06/18 05:15 





                                 12/03/18 05:35 











- Constitutional


Appears: Well, Non-toxic, No Acute Distress





- Head Exam


Head Exam: ATRAUMATIC, NORMOCEPHALIC





- Eye Exam


Eye Exam: EOMI, Normal appearance, PERRL


Pupil Exam: NORMAL ACCOMODATION, PERRL





- ENT Exam


ENT Exam: Mucous Membranes Moist





- Neck Exam


Neck Exam: absent: Full ROM


Additional comments: 





has [place] j-collar maintained





- Respiratory Exam


Respiratory Exam: NORMAL BREATHING PATTERN





- Extremities Exam


Extremities Exam: absent: Calf Tenderness, Pedal Edema





- Neurological Exam


Neurological Exam: Abnormal Gait, Alert, Awake, Oriented x3.  absent: Reflexes 

Normal (hyperflexia BLE)


Neuro motor strength exam: Left Upper Extremity: 5 ( 4/5), Right Upper 

Extremity: 5 ( 5/5), Left Lower Extremity: 3 (hypertonicity still present 

but improving), Right Lower Extremity: 5 (hypertonicity still present but 

improving)


Additional comments: 





speech clear, fluid


sensation is intact and equal b/l.  Pt states sensation improving over T12 area


Hyperflexia noted BLE and hypertonicity is still note BLE but it is improving.





- Psychiatric Exam


Psychiatric exam: Normal Affect, Normal Mood





- Skin


Skin Exam: Normal Color





Assessment and Plan


(1) Epidural abscess


Assessment & Plan: 


Mr. Ken is improving and coming along well since admission with epidural 

abscess. 


-Paresthesias to left hand and left foot likely neuropathic.  Pt has no sensory 

loss on exam to LUE and LLE---We will increase Neurontin to 600 mg PO HS.  If pt


  becomes too sleepy in the morning following the medication and has difficulty 

participating in therapy, we can decrease the dose to 300 mg po hs again.


-Continue PT/OT therapy.


-Decadron taper ordered and dates adjusted:


  Decadron 4 mg PO Q12 x2 days (12/7/18-12/9/18), followed by Decadron 4 mg PO 

daily x2 days (12/10/18-12/12/18), followed by Decadron 2 mg PO daily 


  x2 days (12/13/18-12/15/18).


-Recommend that easton be removed to prevent any unwanted infection.  Bladder 

scan orders placed as well as indications to reinsert easton if needed.


-Removal of Jackson J-collar deferred to neurosurgery.


-Please notify neuro team of any acute changes in condition.


-We will continue to follow the pt while in rehab.


-Plan discussed with nursing.





Case discussed in detail with Dr. Anderson


Status: Acute

## 2018-12-07 NOTE — CP.PCM.PN
Subjective





- Date & Time of Evaluation


Date of Evaluation: 12/07/18


Time of Evaluation: 12:50





- Subjective


Subjective: 





ATTITUDE IMPROVING


MOVED BOWELS


STILL HAS A MCGUIRE IN PLACE





Objective





- Vital Signs/Intake and Output


Vital Signs (last 24 hours): 


                                        











Temp Pulse Resp BP Pulse Ox


 


 98.2 F   66   20   131/78   99 


 


 12/06/18 20:06  12/06/18 20:06  12/06/18 20:06  12/06/18 20:06  12/06/18 20:06








Intake and Output: 


                                        











 12/07/18 12/07/18





 06:59 18:59


 


Intake Total 950 


 


Output Total 2400 


 


Balance -1450 














- Medications


Medications: 


                               Current Medications





Acetaminophen (Tylenol 325mg Tab)  650 mg PO Q6 PRN


   PRN Reason: for pain scale 3-4


Aripiprazole (Abilify)  5 mg PO DAILY [facility]


Baclofen (Lioresal)  10 mg PO TID [facility]


   Last Admin: 12/07/18 09:15 Dose:  10 mg


Bisacodyl (Dulcolax)  10 mg [place] DAILY PRN


   PRN Reason: Constipation


   Last Admin: 12/04/18 06:15 Dose:  10 mg


Dexamethasone (Decadron)  4 mg PO Q12 [facility]


   Stop: 12/09/18 18:01


Dexamethasone (Decadron)  4 mg PO DAILY [facility]


   Stop: 12/12/18 09:01


Dexamethasone (Decadron)  2 mg PO DAILY [facility]


   Stop: 12/15/18 09:01


Docusate Sodium (Colace)  100 mg PO BID [facility]


   Last Admin: 12/07/18 09:13 Dose:  100 mg


Gabapentin (Neurontin)  300 mg PO HS [facility]


   Last Admin: 12/06/18 21:46 Dose:  300 mg


Cefepime HCl 2 gm/ Sodium (Chloride)  100 mls @ 100 mls/hr IVPB Q8@0600,1400

,2200 [facility]


   Last Admin: 12/07/18 06:12 Dose:  100 mls/hr


Vancomycin HCl 1 gm/ Sodium (Chloride)  250 mls @ 166.667 mls/hr IVPB Q12H [facility]


   Last Admin: 12/07/18 01:04 Dose:  166.667 mls/hr


Lactic Acid (Lac-Hydrin 12% Lotion (225 G))  1 applic TOP 0600,1800 [facility]


   Last Admin: 12/07/18 06:47 Dose:  1 applic


Lactulose (Enulose)  20 gm PO DAILY PRN


   PRN Reason: Constipation


   Last Admin: 12/06/18 17:10 Dose:  20 gm


Oxycodone HCl (Oxycodone Immediate Release Tab)  5 mg PO Q6 PRN


   PRN Reason: pain 5-10/10


   Last Admin: 12/03/18 11:25 Dose:  5 mg


Tizanidine HCl (Zanaflex)  8 mg PO HS [facility]


   Last Admin: 12/06/18 21:46 Dose:  8 mg











- Labs


Labs: 


                                        





                                 12/06/18 05:15 





                                 12/03/18 05:35 











- Constitutional


Appears: No Acute Distress





- Head Exam


Head Exam: ATRAUMATIC, NORMAL INSPECTION, NORMOCEPHALIC





- Eye Exam


Eye Exam: EOMI, Normal appearance, PERRL


Pupil Exam: NORMAL ACCOMODATION, PERRL





- ENT Exam


ENT Exam: Mucous Membranes Moist, Normal Exam





- Neck Exam


Neck Exam: Full ROM, Normal Inspection.  absent: Lymphadenopathy





- Respiratory Exam


Respiratory Exam: Clear to Ausculation Bilateral, NORMAL BREATHING PATTERN





- Cardiovascular Exam


Cardiovascular Exam: REGULAR RHYTHM, +S1, +S2.  absent: Murmur





- GI/Abdominal Exam


GI & Abdominal Exam: Soft, Normal Bowel Sounds.  absent: Tenderness





- Rectal Exam


Rectal Exam: NORMAL INSPECTION





- Extremities Exam


Extremities Exam: Full ROM, Normal Capillary Refill, Normal Inspection.  absent:

Joint Swelling, Pedal Edema





- Back Exam


Back Exam: NORMAL INSPECTION





- Neurological Exam


Neurological Exam: Alert, Awake, CN II-XII Intact, Normal Gait, Oriented x3


Additional comments: 





MOVING EXTREMITIES AGAINST GRAVITY





- Psychiatric Exam


Psychiatric exam: Normal Affect, Normal Mood





- Skin


Skin Exam: Dry, Intact, Normal Color, Warm





Assessment and Plan





- Assessment and Plan (Free Text)


Assessment: 





S/P SPINE SURGERY


DISCITIS


URINARY RETENTION


CONSTIPATION


DEPRESSION


Plan: 





BLADDER TRAIN AND D/C MCGUIRE CATH


LAXATIVES FOR CONSTIPATION


ABILIFY FOR DEPRESSION Will have Dr. Tejeda 

## 2018-12-07 NOTE — CP.PCM.PN
Subjective





- Date & Time of Evaluation


Date of Evaluation: 12/07/18


Time of Evaluation: 09:00





- Subjective


Subjective: 





IV rx renewed


will check vanco level





Objective





- Vital Signs/Intake and Output


Vital Signs (last 24 hours): 


                                        











Temp Pulse Resp BP Pulse Ox


 


 98.2 F   66   20   131/78   99 


 


 12/06/18 20:06  12/06/18 20:06  12/06/18 20:06  12/06/18 20:06  12/06/18 20:06








Intake and Output: 


                                        











 12/07/18 12/07/18





 06:59 18:59


 


Intake Total 950 


 


Output Total 2400 


 


Balance -1450 














- Medications


Medications: 


                               Current Medications





Acetaminophen (Tylenol 325mg Tab)  650 mg PO Q6 PRN


   PRN Reason: for pain scale 3-4


Aripiprazole (Abilify)  5 mg PO DAILY [facility]


Baclofen (Lioresal)  10 mg PO TID [facility]


   Last Admin: 12/07/18 09:15 Dose:  10 mg


Bisacodyl (Dulcolax)  10 mg [place] DAILY PRN


   PRN Reason: Constipation


   Last Admin: 12/04/18 06:15 Dose:  10 mg


Dexamethasone (Decadron)  4 mg PO Q12 [facility]


   Stop: 12/09/18 18:01


Dexamethasone (Decadron)  4 mg PO DAILY [facility]


   Stop: 12/12/18 09:01


Dexamethasone (Decadron)  2 mg PO DAILY [facility]


   Stop: 12/15/18 09:01


Docusate Sodium (Colace)  100 mg PO BID [facility]


   Last Admin: 12/07/18 09:13 Dose:  100 mg


Gabapentin (Neurontin)  300 mg PO HS [facility]


   Last Admin: 12/06/18 21:46 Dose:  300 mg


Cefepime HCl 2 gm/ Sodium (Chloride)  100 mls @ 100 mls/hr IVPB 

Q8@0600,1400,2200 [facility]


   Last Admin: 12/07/18 06:12 Dose:  100 mls/hr


Vancomycin HCl 1 gm/ Sodium (Chloride)  250 mls @ 166.667 mls/hr IVPB Q12H [facility]


   Last Admin: 12/07/18 01:04 Dose:  166.667 mls/hr


Lactic Acid (Lac-Hydrin 12% Lotion (225 G))  1 applic TOP 0600,1800 [facility]


   Last Admin: 12/07/18 06:47 Dose:  1 applic


Lactulose (Enulose)  20 gm PO DAILY PRN


   PRN Reason: Constipation


   Last Admin: 12/06/18 17:10 Dose:  20 gm


Oxycodone HCl (Oxycodone Immediate Release Tab)  5 mg PO Q6 PRN


   PRN Reason: pain 5-10/10


   Last Admin: 12/03/18 11:25 Dose:  5 mg


Tizanidine HCl (Zanaflex)  8 mg PO HS [facility]


   Last Admin: 12/06/18 21:46 Dose:  8 mg











- Labs


Labs: 


                                        





                                 12/06/18 05:15 





                                 12/03/18 05:35 











Assessment and Plan


(1) Back pain


Status: Acute   





(2) Discitis of cervical region


Status: Acute   





(3) Epidural abscess


Status: Acute   





(4) Osteomyelitis of cervical spine


Status: Acute

## 2018-12-07 NOTE — CP.PCM.CON
History of Present Illness





- History of Present Illness


History of Present Illness: 


consult requested for depression








pt is a 34 yo male , no previous formal psychiatric treatment , admitted  


to ICU with discitis/ epidural abscess pt had  abscess drainage / decompression 

and transferred to rehab for aggressive PT  and continuation of antibiotic


on evaluation pt reported feeling depressed and down, relates that to his 

current medical condition as he has a hard time to ambulate and hard time to 

adjusting being in the hospital for a long time


pt denied any previous formal psychiatric treatment, reported he has always had 

mood swings with episodes of depression alternating with episodes of anger and 

irritability, pt reported having self harm behaviour by superficially cutting 

his arm , last was about three years ago, 


pt has been in custodial for three years for burglary , was placed on medications but

was non compliant , stated he self medicates by using cannabis almost daily 


at the current mental status patient reported feeling down, no reported changes 

in sleep or appetite, mood reported upset, affect constricted , thought form 

coherent denied any current suicidal or homicidal ideation, denied perceptual 

disturbances, non elicited 


alert awake ox3











Past Patient History





- Past Medical History & Family History


Past Medical History?: Yes





- Past Social History


Smoking Status: Current Some Days Smoker


Alcohol: < 2 Drinks/Day


Drugs: Cannabis


Home Situation {Lives}: With Family





- CARDIAC


Hx Cardiac Disorders: No





- PULMONARY


Hx Respiratory Disorders: No





- HEMATOLOGICAL/ONCOLOGICAL


Hx AIDS: No


Hx Human Immunodeficiency Virus (HIV): No





- MUSCULOSKELETAL/RHEUMATOLOGICAL


Hx Falls: Yes (few days ago)





- PSYCHIATRIC


Hx Substance Use: Yes (claimed smoke Marijuana)





- SURGICAL HISTORY


Hx Surgeries: Yes


Other/Comment: hernia surgery





- ANESTHESIA


Hx Anesthesia: Yes


Hx Anesthesia Reactions: No


Hx Malignant Hyperthermia: No





Meds


Allergies/Adverse Reactions: 


                                    Allergies











Allergy/AdvReac Type Severity Reaction Status Date / Time


 


No Known Allergies Allergy   Verified 11/30/18 15:54














- Medications


Medications: 


                               Current Medications





Acetaminophen (Tylenol 325mg Tab)  650 mg PO Q6 PRN


   PRN Reason: for pain scale 3-4


Baclofen (Lioresal)  10 mg PO TID [facility]


   Last Admin: 12/07/18 09:15 Dose:  10 mg


Bisacodyl (Dulcolax)  10 mg [place] DAILY PRN


   PRN Reason: Constipation


   Last Admin: 12/04/18 06:15 Dose:  10 mg


Dexamethasone (Decadron)  4 mg PO Q8 [facility]


   Last Admin: 12/07/18 06:12 Dose:  4 mg


Docusate Sodium (Colace)  100 mg PO BID [facility]


   Last Admin: 12/07/18 09:13 Dose:  100 mg


Gabapentin (Neurontin)  300 mg PO [facility]


   Last Admin: 12/06/18 21:46 Dose:  300 mg


Cefepime HCl 2 gm/ Sodium (Chloride)  100 mls @ 100 mls/hr IVPB 

Q8@0600,1400,2200 [facility]


   Last Admin: 12/07/18 06:12 Dose:  100 mls/hr


Vancomycin HCl 1 gm/ Sodium (Chloride)  250 mls @ 166.667 mls/hr IVPB Q12H [facility]


   Last Admin: 12/07/18 01:04 Dose:  166.667 mls/hr


Lactic Acid (Lac-Hydrin 12% Lotion (225 G))  1 applic TOP 0600,1800 [facility]


   Last Admin: 12/07/18 06:47 Dose:  1 applic


Lactulose (Enulose)  20 gm PO DAILY PRN


   PRN Reason: Constipation


   Last Admin: 12/06/18 17:10 Dose:  20 gm


Oxycodone HCl (Oxycodone Immediate Release Tab)  5 mg PO Q6 PRN


   PRN Reason: pain 5-10/10


   Last Admin: 12/03/18 11:25 Dose:  5 mg


Tizanidine HCl (Zanaflex)  8 mg PO [facility]


   Last Admin: 12/06/18 21:46 Dose:  8 mg











Results





- Vital Signs


Recent Vital Signs: 


                                Last Vital Signs











Temp  98.2 F   12/06/18 20:06


 


Pulse  66   12/06/18 20:06


 


Resp  20   12/06/18 20:06


 


BP  131/78   12/06/18 20:06


 


Pulse Ox  99   12/06/18 20:06














- Labs


Result Diagrams: 


                                 12/06/18 05:15





                                 12/03/18 05:35





Assessment & Plan





- Assessment and Plan (Free Text)


Assessment: 





depression


rule out bipolar disorder depressed 


cannabis abuse continuous 


Plan: 





recommend to start abilify 5mg daily for depression and mood stabilization


recommend that  to arrange for outpatient psychiatric treatment /

and therapy on discharge

## 2018-12-08 NOTE — CP.PCM.PN
Subjective





- Date & Time of Evaluation


Date of Evaluation: 12/08/18


Time of Evaluation: 11:06





- Subjective


Subjective: 





MCGUIRE CATH REMOVED


ABLE TO VOID 





Objective





- Vital Signs/Intake and Output


Vital Signs (last 24 hours): 


                                        











Temp Pulse Resp BP Pulse Ox


 


 97.9 F   57 L  20   116/61   98 


 


 12/08/18 08:00  12/08/18 08:00  12/08/18 08:00  12/08/18 08:00  12/08/18 08:00








Intake and Output: 


                                        











 12/08/18 12/08/18





 06:59 18:59


 


Intake Total 980 


 


Output Total 1750 


 


Balance -770 














- Medications


Medications: 


                               Current Medications





Acetaminophen (Tylenol 325mg Tab)  650 mg PO Q6 PRN


   PRN Reason: for pain scale 3-4


Aripiprazole (Abilify)  5 mg PO HS [facility]


   Last Admin: 12/07/18 21:36 Dose:  5 mg


Baclofen (Lioresal)  10 mg PO TID [facility]


   Last Admin: 12/08/18 08:25 Dose:  10 mg


Bisacodyl (Dulcolax)  10 mg [place] DAILY PRN


   PRN Reason: Constipation


   Last Admin: 12/04/18 06:15 Dose:  10 mg


Dexamethasone (Decadron)  4 mg PO Q12 [facility]


   Stop: 12/09/18 18:01


   Last Admin: 12/08/18 08:25 Dose:  4 mg


Dexamethasone (Decadron)  4 mg PO DAILY [facility]


   Stop: 12/12/18 09:01


Dexamethasone (Decadron)  2 mg PO DAILY [facility]


   Stop: 12/15/18 09:01


Docusate Sodium (Colace)  100 mg PO BID [facility]


   Last Admin: 12/08/18 08:24 Dose:  100 mg


Gabapentin (Neurontin)  600 mg PO HS [facility]


   Last Admin: 12/07/18 21:46 Dose:  600 mg


Cefepime HCl 2 gm/ Sodium (Chloride)  100 mls @ 100 mls/hr IVPB 

Q8@0600,1400,2200 [facility]


   Last Admin: 12/08/18 05:57 Dose:  100 mls/hr


Vancomycin HCl 1 gm/ Sodium (Chloride)  250 mls @ 166.667 mls/hr IVPB Q12H [facility]


   Last Admin: 12/08/18 00:30 Dose:  166.667 mls/hr


Lactic Acid (Lac-Hydrin 12% Lotion (225 G))  1 applic TOP 0600,1800 [facility]


   Last Admin: 12/08/18 05:57 Dose:  1 applic


Lactulose (Enulose)  20 gm PO DAILY PRN


   PRN Reason: Constipation


   Last Admin: 12/06/18 17:10 Dose:  20 gm


Oxycodone HCl (Oxycodone Immediate Release Tab)  5 mg PO Q6 PRN


   PRN Reason: pain 5-10/10


   Last Admin: 12/03/18 11:25 Dose:  5 mg


Tizanidine HCl (Zanaflex)  8 mg PO HS [facility]


   Last Admin: 12/07/18 21:37 Dose:  8 mg











- Labs


Labs: 


                                        





                                 12/06/18 05:15 





                                 12/03/18 05:35 











- Constitutional


Appears: No Acute Distress





- Head Exam


Head Exam: ATRAUMATIC, NORMAL INSPECTION, NORMOCEPHALIC





- Eye Exam


Eye Exam: EOMI, Normal appearance, PERRL


Pupil Exam: NORMAL ACCOMODATION, PERRL





- ENT Exam


ENT Exam: Mucous Membranes Moist, Normal Exam





- Neck Exam


Neck Exam: Full ROM, Normal Inspection.  absent: Lymphadenopathy





- Respiratory Exam


Respiratory Exam: Clear to Ausculation Bilateral, NORMAL BREATHING PATTERN





- Cardiovascular Exam


Cardiovascular Exam: REGULAR RHYTHM, +S1, +S2.  absent: Murmur





- GI/Abdominal Exam


GI & Abdominal Exam: Soft, Normal Bowel Sounds.  absent: Tenderness





- Rectal Exam


Rectal Exam: NORMAL INSPECTION





- Extremities Exam


Extremities Exam: Full ROM, Normal Capillary Refill, Normal Inspection.  absent:

Joint Swelling, Pedal Edema





- Back Exam


Back Exam: NORMAL INSPECTION





- Neurological Exam


Neurological Exam: Abnormal Gait, Alert, Awake, CN II-XII Intact, Oriented x3





- Psychiatric Exam


Psychiatric exam: Normal Affect, Normal Mood





- Skin


Skin Exam: Dry, Intact, Normal Color, Warm





Assessment and Plan





- Assessment and Plan (Free Text)


Assessment: 





DISCITIS


S/P SPINE SURGERY WITH EVACUATION OF ABSCESS


Plan: 





CONTINUE CURRENT RX

## 2018-12-09 LAB
ALBUMIN SERPL-MCNC: 3.3 G/DL (ref 3.5–5)
ALBUMIN/GLOB SERPL: 1 {RATIO} (ref 1–2.1)
ALT SERPL-CCNC: 34 U/L (ref 21–72)
ANISOCYTOSIS BLD QL SMEAR: SLIGHT
AST SERPL-CCNC: 22 U/L (ref 17–59)
BASOPHILS # BLD AUTO: 0 K/UL (ref 0–0.2)
BASOPHILS NFR BLD: 0.2 % (ref 0–2)
BUN SERPL-MCNC: 20 MG/DL (ref 9–20)
CALCIUM SERPL-MCNC: 9.1 MG/DL (ref 8.4–10.2)
EOSINOPHIL # BLD AUTO: 0.1 K/UL (ref 0–0.7)
EOSINOPHIL NFR BLD: 0.5 % (ref 0–4)
ERYTHROCYTE [DISTWIDTH] IN BLOOD BY AUTOMATED COUNT: 13.8 % (ref 11.5–14.5)
GFR NON-AFRICAN AMERICAN: > 60
HGB BLD-MCNC: 11.7 G/DL (ref 12–18)
LYMPHOCYTE: 6 % (ref 20–50)
LYMPHOCYTES # BLD AUTO: 1 K/UL (ref 1–4.3)
LYMPHOCYTES NFR BLD AUTO: 6.2 % (ref 20–40)
MCH RBC QN AUTO: 30.8 PG (ref 27–31)
MCHC RBC AUTO-ENTMCNC: 32.3 G/DL (ref 33–37)
MCV RBC AUTO: 95.5 FL (ref 80–94)
MICROCYTES BLD QL SMEAR: SLIGHT
MONOCYTE: 4 % (ref 0–10)
MONOCYTES # BLD: 1.3 K/UL (ref 0–0.8)
MONOCYTES NFR BLD: 7.6 % (ref 0–10)
NEUTROPHILS # BLD: 14.1 K/UL (ref 1.8–7)
NEUTROPHILS NFR BLD AUTO: 85.5 % (ref 50–75)
NEUTROPHILS NFR BLD AUTO: 90 % (ref 42–75)
NRBC BLD AUTO-RTO: 0 % (ref 0–0)
PLATELET # BLD EST: NORMAL 10*3/UL
PLATELET # BLD: 298 K/UL (ref 130–400)
PMV BLD AUTO: 7.1 FL (ref 7.2–11.7)
RBC # BLD AUTO: 3.78 MIL/UL (ref 4.4–5.9)
TOTAL CELLS COUNTED BLD: 100
WBC # BLD AUTO: 16.5 K/UL (ref 4.8–10.8)

## 2018-12-09 NOTE — CP.PCM.PN
Subjective





- Date & Time of Evaluation


Date of Evaluation: 12/09/18


Time of Evaluation: 10:12





- Subjective


Subjective: 





clinically improving


denies pain


able to move all extremities


poor bowel movement but able to urinate


labs reviewed





Objective





- Vital Signs/Intake and Output


Vital Signs (last 24 hours): 


                                        











Temp Pulse Resp BP Pulse Ox


 


 97.9 F   67   20   130/68   98 


 


 12/09/18 09:58  12/09/18 09:58  12/09/18 09:58  12/09/18 09:58  12/09/18 09:58











- Medications


Medications: 


                               Current Medications





Acetaminophen (Tylenol 325mg Tab)  650 mg PO Q6 PRN


   PRN Reason: for pain scale 3-4


Aripiprazole (Abilify)  5 mg PO HS [facility]


   Last Admin: 12/08/18 21:08 Dose:  5 mg


Baclofen (Lioresal)  10 mg PO TID [facility]


   Last Admin: 12/09/18 08:31 Dose:  10 mg


Bisacodyl (Dulcolax)  10 mg [place] DAILY PRN


   PRN Reason: Constipation


   Last Admin: 12/04/18 06:15 Dose:  10 mg


Dexamethasone (Decadron)  4 mg PO Q12 [facility]


   Stop: 12/09/18 18:01


   Last Admin: 12/09/18 08:32 Dose:  4 mg


Dexamethasone (Decadron)  4 mg PO DAILY [facility]


   Stop: 12/12/18 09:01


Dexamethasone (Decadron)  2 mg PO DAILY [facility]


   Stop: 12/15/18 09:01


Docusate Sodium (Colace)  100 mg PO BID [facility]


   Last Admin: 12/09/18 08:31 Dose:  100 mg


Gabapentin (Neurontin)  600 mg PO HS [facility]


   Last Admin: 12/08/18 21:08 Dose:  600 mg


Cefepime HCl 2 gm/ Sodium (Chloride)  100 mls @ 100 mls/hr IVPB 

Q8@0600,1400,2200 [facility]


   Last Admin: 12/09/18 05:22 Dose:  100 mls/hr


Vancomycin HCl 1 gm/ Sodium (Chloride)  250 mls @ 166.667 mls/hr IVPB Q12H [facility]


   Last Admin: 12/09/18 00:14 Dose:  166.667 mls/hr


Lactic Acid (Lac-Hydrin 12% Lotion (225 G))  1 applic TOP 0600,1800 [facility]


   Last Admin: 12/09/18 05:23 Dose:  1 applic


Lactulose (Enulose)  20 gm PO DAILY PRN


   PRN Reason: Constipation


   Last Admin: 12/08/18 17:35 Dose:  20 gm


Oxycodone HCl (Oxycodone Immediate Release Tab)  5 mg PO Q6 PRN


   PRN Reason: pain 5-10/10


   Last Admin: 12/03/18 11:25 Dose:  5 mg


Tizanidine HCl (Zanaflex)  8 mg PO HS [facility]


   Last Admin: 12/08/18 21:08 Dose:  8 mg











- Labs


Labs: 


                                        





                                 12/09/18 06:00 





                                 12/09/18 06:00 











- Constitutional


Appears: No Acute Distress





- Head Exam


Head Exam: ATRAUMATIC, NORMAL INSPECTION, NORMOCEPHALIC





- Eye Exam


Eye Exam: EOMI, Normal appearance, PERRL


Pupil Exam: NORMAL ACCOMODATION, PERRL





- ENT Exam


ENT Exam: Mucous Membranes Moist, Normal Exam





- Neck Exam


Neck Exam: Full ROM, Normal Inspection.  absent: Lymphadenopathy





- Respiratory Exam


Respiratory Exam: Clear to Ausculation Bilateral, NORMAL BREATHING PATTERN





- Cardiovascular Exam


Cardiovascular Exam: REGULAR RHYTHM, +S1, +S2.  absent: Murmur





- GI/Abdominal Exam


GI & Abdominal Exam: Soft, Normal Bowel Sounds.  absent: Tenderness





- Rectal Exam


Rectal Exam: NORMAL INSPECTION





- Extremities Exam


Extremities Exam: Full ROM, Normal Capillary Refill, Normal Inspection.  absent:

Joint Swelling, Pedal Edema





- Back Exam


Back Exam: NORMAL INSPECTION





- Neurological Exam


Neurological Exam: Alert, Awake, CN II-XII Intact, Oriented x3


Neuro motor strength exam: Left Upper Extremity: 3, Right Upper Extremity: 2/1, 

Left Lower Extremity: 3, Right Lower Extremity: 2/1





- Psychiatric Exam


Psychiatric exam: Normal Affect, Normal Mood





- Skin


Skin Exam: Dry, Intact, Normal Color, Warm





Assessment and Plan





- Assessment and Plan (Free Text)


Assessment: 





s/p spine surgery and evacuation of abscess


paraplegia--improving


leukocytosis due to spine abscess and steroids


Plan: 





continue current rx

## 2018-12-09 NOTE — CP.PCM.PN
Subjective





- Date & Time of Evaluation


Date of Evaluation: 12/09/18


Time of Evaluation: 08:00





- Subjective


Subjective: 





improved but still not ambulatory


no fever or pain


moves all extremities 





Objective





- Vital Signs/Intake and Output


Vital Signs (last 24 hours): 


                                        











Temp Pulse Resp BP Pulse Ox


 


 97.9 F   67   20   130/68   98 


 


 12/09/18 09:58  12/09/18 09:58  12/09/18 09:58  12/09/18 09:58  12/09/18 09:58











- Medications


Medications: 


                               Current Medications





Acetaminophen (Tylenol 325mg Tab)  650 mg PO Q6 PRN


   PRN Reason: for pain scale 3-4


Aripiprazole (Abilify)  [facility] PO HS [facility]


   Last Admin: 12/08/18 21:08 Dose:  5 mg


Baclofen (Lioresal)  10 mg PO TID [facility]


   Last Admin: 12/09/18 12:04 Dose:  10 mg


Bisacodyl (Dulcolax)  10 mg [place] DAILY PRN


   PRN Reason: Constipation


   Last Admin: 12/04/18 06:15 Dose:  10 mg


Dexamethasone (Decadron)  4 mg PO Q12 [facility]


   Stop: 12/09/18 18:01


   Last Admin: 12/09/18 08:32 Dose:  4 mg


Dexamethasone (Decadron)  4 mg PO DAILY [facility]


   Stop: 12/12/18 09:01


Dexamethasone (Decadron)  2 mg PO DAILY [facility]


   Stop: 12/15/18 09:01


Docusate Sodium (Colace)  100 mg PO BID [facility]


   Last Admin: 12/09/18 08:31 Dose:  100 mg


Gabapentin (Neurontin)  600 mg PO [facility]


   Last Admin: 12/08/18 21:08 Dose:  600 mg


Cefepime HCl 2 gm/ Sodium (Chloride)  100 mls @ 100 mls/hr IVPB 

Q8@0600,1400,2200 [facility]


   Last Admin: 12/09/18 05:22 Dose:  100 mls/hr


Vancomycin HCl 1 gm/ Sodium (Chloride)  250 mls @ 166.667 mls/hr IVPB Q12H [facility]


   Last Admin: 12/09/18 12:05 Dose:  166.667 mls/hr


Lactic Acid (Lac-Hydrin 12% Lotion (225 G))  1 applic TOP 0600,1800 [facility]


   Last Admin: 12/09/18 05:23 Dose:  1 applic


Lactulose (Enulose)  20 gm PO DAILY PRN


   PRN Reason: Constipation


   Last Admin: 12/08/18 17:35 Dose:  20 gm


Oxycodone HCl (Oxycodone Immediate Release Tab)  5 mg PO Q6 PRN


   PRN Reason: pain 5-10/10


   Last Admin: 12/03/18 11:25 Dose:  5 mg


Tizanidine HCl (Zanaflex)  8 mg PO HS [facility]


   Last Admin: 12/08/18 21:08 Dose:  8 mg











- Labs


Labs: 


                                        





                                 12/09/18 06:00 





                                 12/09/18 06:00 











- Constitutional


Appears: Non-toxic, Chronically Ill





- Head Exam


Head Exam: NORMOCEPHALIC





- Eye Exam


Eye Exam: absent: Scleral icterus





- ENT Exam


ENT Exam: Mucous Membranes Dry





- Neck Exam


Neck Exam: absent: Lymphadenopathy





- Respiratory Exam


Respiratory Exam: Decreased Breath Sounds





- Cardiovascular Exam


Cardiovascular Exam: REGULAR RHYTHM





- GI/Abdominal Exam


GI & Abdominal Exam: Distended, Soft





- Rectal Exam


Rectal Exam: Deferred





-  Exam


 Exam: NORMAL INSPECTION





- Extremities Exam


Extremities Exam: absent: Pedal Edema





- Back Exam


Back Exam: absent: CVA tenderness (L), CVA tenderness (R)





- Neurological Exam


Neurological Exam: Alert, Awake, CN II-XII Intact, Motor Sensory Deficit, 

Oriented x3.  absent: Normal Gait, Reflexes Normal





- Psychiatric Exam


Psychiatric exam: Depressed





- Skin


Skin Exam: Dry





Assessment and Plan


(1) Back pain


Status: Acute   





(2) Discitis of cervical region


Status: Acute   





(3) Epidural abscess


Status: Acute   





(4) Osteomyelitis of cervical spine


Status: Acute   





- Assessment and Plan (Free Text)


Assessment: 





quadraparesis   improving s/p cervical spine epidural abscess / discitis


Cont IV antibiotics for 6-8 weeks


consider ASHLEY when/ if possible in future to r/o PFO 





Cultures grew acinetobacter from OR-  concern remains for gram +/ Mixed i

nfection as source of bacteremia not identified 


will need long term PT/OT , follow up imaging

## 2018-12-10 NOTE — CP.PCM.PN
Subjective





- Date & Time of Evaluation


Date of Evaluation: 12/10/18


Time of Evaluation: 18:02





- Subjective


Subjective: 





Patient seen in the room


no pain at this time


in good spirits


working on AROM of the LEs throughout the day


No BM for  4 days again


I have given his friend a script for the magic bullet since the pharmacy cannot 

get this


voiding now without [place]. will get f/u [facility]


continue current care





Objective





- Vital Signs/Intake and Output


Vital Signs (last 24 hours): 


                                        











Temp Pulse Resp BP Pulse Ox


 


 98.1 F   83   19   128/68   97 


 


 12/10/18 08:20  12/10/18 08:20  12/10/18 08:20  12/10/18 08:20  12/10/18 08:20








Intake and Output: 


                                        











 12/10/18 12/10/18





 06:59 18:59


 


Intake Total  1190


 


Output Total  960


 


Balance  230














- Medications


Medications: 


                               Current Medications





Acetaminophen (Tylenol 325mg Tab)  650 mg PO Q6 PRN


   PRN Reason: for pain scale 3-4


Aripiprazole (Abilify)  5 mg PO HS [facility]


   Last Admin: 12/09/18 21:28 Dose:  5 mg


Baclofen (Lioresal)  10 mg PO TID [facility]


   Last Admin: 12/10/18 16:58 Dose:  10 mg


Bisacodyl (Dulcolax)  10 mg [place] DAILY PRN


   PRN Reason: Constipation


   Last Admin: 12/04/18 06:15 Dose:  10 mg


Dexamethasone (Decadron)  4 mg PO DAILY [facility]


   Stop: 12/12/18 09:01


   Last Admin: 12/10/18 08:15 Dose:  4 mg


Dexamethasone (Decadron)  2 mg PO DAILY [facility]


   Stop: 12/15/18 09:01


Docusate Sodium (Colace)  100 mg PO BID [facility]


   Last Admin: 12/10/18 16:58 Dose:  100 mg


Gabapentin (Neurontin)  600 mg PO HS [facility]


   Last Admin: 12/09/18 21:28 Dose:  600 mg


Cefepime HCl 2 gm/ Sodium (Chloride)  100 mls @ 100 mls/hr IVPB 

Q8@0600,1400,2200 [facility]


   Last Admin: 12/10/18 14:28 Dose:  100 mls/hr


Vancomycin HCl 1 gm/ Sodium (Chloride)  250 mls @ 166.667 mls/hr IVPB Q12H [facility]


   Last Admin: 12/10/18 12:50 Dose:  166.667 mls/hr


Lactic Acid (Lac-Hydrin 12% Lotion (225 G))  1 applic TOP 0600,1800 [facility]


   Last Admin: 12/10/18 17:01 Dose:  1 applic


Lactulose (Enulose)  20 gm PO DAILY PRN


   PRN Reason: Constipation


   Last Admin: 12/10/18 16:57 Dose:  20 gm


Oxycodone HCl (Oxycodone Immediate Release Tab)  5 mg PO Q6 PRN


   PRN Reason: pain 5-10/10


   Last Admin: 12/03/18 11:25 Dose:  5 mg


Tizanidine HCl (Zanaflex)  8 mg PO HS [facility]


   Last Admin: 12/09/18 21:28 Dose:  8 mg











- Labs


Labs: 


                                        





                                 12/09/18 06:00 





                                 12/09/18 06:00

## 2018-12-10 NOTE — CP.PCM.PN
Subjective





- Date & Time of Evaluation


Date of Evaluation: 12/10/18


Time of Evaluation: 09:31





- Subjective


Subjective: 





STILL UNABLE TO MOVE BOWELS


LOWER EXTREMITIES STILL WEAK BUT IMPROVING





Objective





- Vital Signs/Intake and Output


Vital Signs (last 24 hours): 


                                        











Temp Pulse Resp BP Pulse Ox


 


 98.1 F   83   19   128/68   97 


 


 12/10/18 08:20  12/10/18 08:20  12/10/18 08:20  12/10/18 08:20  12/10/18 08:20











- Medications


Medications: 


                               Current Medications





Acetaminophen (Tylenol 325mg Tab)  650 mg PO Q6 PRN


   PRN Reason: for pain scale 3-4


Aripiprazole (Abilify)  5 mg PO HS [facility]


   Last Admin: 12/09/18 21:28 Dose:  5 mg


Baclofen (Lioresal)  10 mg PO TID [facility]


   Last Admin: 12/10/18 08:15 Dose:  10 mg


Bisacodyl (Dulcolax)  10 mg [place] DAILY PRN


   PRN Reason: Constipation


   Last Admin: 12/04/18 06:15 Dose:  10 mg


Dexamethasone (Decadron)  4 mg PO DAILY [facility]


   Stop: 12/12/18 09:01


   Last Admin: 12/10/18 08:15 Dose:  4 mg


Dexamethasone (Decadron)  2 mg PO DAILY [facility]


   Stop: 12/15/18 09:01


Docusate Sodium (Colace)  100 mg PO BID [facility]


   Last Admin: 12/10/18 08:15 Dose:  100 mg


Gabapentin (Neurontin)  600 mg PO [facility]


   Last Admin: 12/09/18 21:28 Dose:  600 mg


Cefepime HCl 2 gm/ Sodium (Chloride)  100 mls @ 100 mls/hr IVPB 

Q8@0600,1400,2200 [facility]


   Last Admin: 12/10/18 05:57 Dose:  100 mls/hr


Vancomycin HCl 1 gm/ Sodium (Chloride)  250 mls @ 166.667 mls/hr IVPB Q12H [facility]


   Last Admin: 12/10/18 00:05 Dose:  166.667 mls/hr


Lactic Acid (Lac-Hydrin 12% Lotion (225 G))  1 applic TOP 0600,1800 [facility]


   Last Admin: 12/10/18 05:59 Dose:  1 applic


Lactulose (Enulose)  20 gm PO DAILY PRN


   PRN Reason: Constipation


   Last Admin: 12/08/18 17:35 Dose:  20 gm


Oxycodone HCl (Oxycodone Immediate Release Tab)  5 mg PO Q6 PRN


   PRN Reason: pain 5-10/10


   Last Admin: 12/03/18 11:25 Dose:  5 mg


Tizanidine HCl (Zanaflex)  8 mg PO HS [facility]


   Last Admin: 12/09/18 21:28 Dose:  8 mg











- Labs


Labs: 


                                        





                                 12/09/18 06:00 





                                 12/09/18 06:00 











- Constitutional


Appears: No Acute Distress





- Head Exam


Head Exam: ATRAUMATIC, NORMAL INSPECTION, NORMOCEPHALIC





- Eye Exam


Eye Exam: EOMI, Normal appearance, PERRL


Pupil Exam: NORMAL ACCOMODATION, PERRL





- ENT Exam


ENT Exam: Mucous Membranes Moist, Normal Exam





- Neck Exam


Neck Exam: Full ROM, Normal Inspection.  absent: Lymphadenopathy





- Respiratory Exam


Respiratory Exam: Clear to Ausculation Bilateral, NORMAL BREATHING PATTERN





- Cardiovascular Exam


Cardiovascular Exam: REGULAR RHYTHM, +S1, +S2.  absent: Murmur





- GI/Abdominal Exam


GI & Abdominal Exam: Soft, Normal Bowel Sounds.  absent: Tenderness





- Rectal Exam


Rectal Exam: NORMAL INSPECTION





- Extremities Exam


Extremities Exam: Full ROM, Normal Capillary Refill, Normal Inspection.  absent:

Joint Swelling, Pedal Edema





- Back Exam


Back Exam: NORMAL INSPECTION





- Neurological Exam


Neurological Exam: Abnormal Gait, Alert, Awake, CN II-XII Intact, Oriented x3


Neuro motor strength exam: Left Upper Extremity: 3, Right Upper Extremity: 2/1, 

Left Lower Extremity: 3, Right Lower Extremity: 2/1





- Psychiatric Exam


Psychiatric exam: Normal Affect, Normal Mood





- Skin


Skin Exam: Dry, Intact, Normal Color, Warm





Assessment and Plan





- Assessment and Plan (Free Text)


Assessment: 





S/P SPINE SURGERY


S/P DRAINAGE OF EPIDURAL ABSCESS


PARAPLEGIA IMPROVING


CONSTIPATION--PROBABLY NEUROGENIC IN ORIGIN


Plan: 





CONTINUE CURRENT RX


MAY NEED TRIAL OF LINZES IF CONSTIPATION PERSISTS

## 2018-12-11 NOTE — CP.PCM.PN
Subjective





- Date & Time of Evaluation


Date of Evaluation: 12/11/18


Time of Evaluation: 08:40





- Subjective


Subjective: 





still constipated


no apparent distress





Objective





- Vital Signs/Intake and Output


Vital Signs (last 24 hours): 


                                        











Temp Pulse Resp BP Pulse Ox


 


 98.1 F   76   20   127/78   97 


 


 12/10/18 20:00  12/10/18 20:00  12/10/18 20:00  12/10/18 20:00  12/10/18 20:00











- Medications


Medications: 


                               Current Medications





Acetaminophen (Tylenol 325mg Tab)  650 mg PO Q6 PRN


   PRN Reason: for pain scale 3-4


Aripiprazole (Abilify)  5 mg PO HS [facility]


   Last Admin: 12/10/18 21:37 Dose:  5 mg


Baclofen (Lioresal)  10 mg PO TID [facility]


   Last Admin: 12/10/18 16:58 Dose:  10 mg


Bisacodyl (Dulcolax)  10 mg [place] DAILY PRN


   PRN Reason: Constipation


   Last Admin: 12/11/18 06:18 Dose:  10 mg


Dexamethasone (Decadron)  4 mg PO DAILY [facility]


   Stop: 12/12/18 09:01


   Last Admin: 12/10/18 08:15 Dose:  4 mg


Dexamethasone (Decadron)  2 mg PO DAILY [facility]


   Stop: 12/15/18 09:01


Docusate Sodium (Colace)  100 mg PO BID [facility]


   Last Admin: 12/10/18 16:58 Dose:  100 mg


Gabapentin (Neurontin)  600 mg PO [facility]


   Last Admin: 12/10/18 21:39 Dose:  600 mg


Cefepime HCl 2 gm/ Sodium (Chloride)  100 mls @ 100 mls/hr IVPB 

Q8@0600,1400,2200 [facility]


   Last Admin: 12/11/18 06:17 Dose:  100 mls/hr


Vancomycin HCl 1 gm/ Sodium (Chloride)  250 mls @ 166.667 mls/hr IVPB Q12H [facility]


   Last Admin: 12/11/18 01:09 Dose:  166.667 mls/hr


Lactic Acid (Lac-Hydrin 12% Lotion (225 G))  1 applic TOP 0600,1800 [facility]


   Last Admin: 12/11/18 06:17 Dose:  1 applic


Lactulose (Enulose)  20 gm PO DAILY PRN


   PRN Reason: Constipation


   Last Admin: 12/10/18 16:57 Dose:  20 gm


Oxycodone HCl (Oxycodone Immediate Release Tab)  5 mg PO Q6 PRN


   PRN Reason: pain 5-10/10


   Last Admin: 12/03/18 11:25 Dose:  5 mg


Tizanidine HCl (Zanaflex)  8 mg PO HS [facility]


   Last Admin: 12/10/18 21:39 Dose:  8 mg











- Labs


Labs: 


                                        





                                 12/09/18 06:00 





                                 12/09/18 06:00 











- Constitutional


Appears: No Acute Distress





- Head Exam


Head Exam: ATRAUMATIC, NORMAL INSPECTION, NORMOCEPHALIC





- Eye Exam


Eye Exam: EOMI, Normal appearance, PERRL


Pupil Exam: NORMAL ACCOMODATION, PERRL





- ENT Exam


ENT Exam: Mucous Membranes Moist, Normal Exam





- Neck Exam


Neck Exam: Full ROM, Normal Inspection.  absent: Lymphadenopathy





- Respiratory Exam


Respiratory Exam: Clear to Ausculation Bilateral, NORMAL BREATHING PATTERN





- Cardiovascular Exam


Cardiovascular Exam: REGULAR RHYTHM, +S1, +S2.  absent: Murmur





- GI/Abdominal Exam


GI & Abdominal Exam: Soft, Normal Bowel Sounds.  absent: Tenderness





- Rectal Exam


Rectal Exam: NORMAL INSPECTION





- Extremities Exam


Extremities Exam: Full ROM, Normal Capillary Refill, Normal Inspection.  absent:

Joint Swelling, Pedal Edema





- Back Exam


Back Exam: NORMAL INSPECTION





- Neurological Exam


Neurological Exam: Abnormal Gait, Alert, Awake, CN II-XII Intact, Oriented x3





- Psychiatric Exam


Psychiatric exam: Normal Affect, Normal Mood





- Skin


Skin Exam: Dry, Intact, Normal Color, Warm





Assessment and Plan





- Assessment and Plan (Free Text)


Assessment: 





s/p evacuation of epidural abscess


constipation


Plan: 





laxativws,prn

## 2018-12-11 NOTE — PCM.PSYTMC
Acute Rehab Team Conference -





- Vital Signs:


Vital Signs (Last 8 Hours): 


                                   Vital Signs











  12/11/18 12/11/18





  09:00 09:38


 


Temperature 98.1 F 98.1 F


 


Pulse Rate 91 H 91 H


 


Respiratory 20 20





Rate  


 


Blood Pressure 130/71 130/71


 


O2 Sat by Pulse  97





Oximetry  











Pain: 0





- Precautions:


Precautions: Fall Prevention, Pressure Ulcer





- Medications/Other Issues:


Comment: - still constipated despite Lactulose PRN, Colace BID and Dulcolax Supp

PRN. Dr. Nieto aware, with new orders made. Dr. Morales prescribed Enemeez, pt 

will pick it up from their pharmacy.  - 2wayFC removed 12/7, pt voiding freely. 

- Seen by Dr. Wiggins, started pt on Abilify 5 mg @[facility] on 12/7/2018





- Consults:


Comment: Dr. Morales, Dr. Wiggins, Dr. Esqueda, Dr. Anderson, Dr. Monzon, Dr. ALVIN Day





- Skin:


Incision Site: Posterior neck ( Nape )


Dressing Status: Clean, Dry, Intact


Incision: Healing Well, Staples Intact, No Drainage Noted


Incision Line Treatment: Cleansed with NSS and covered with dry dressing BID.





- Toileting:


Toileting: Dependent





- Bladder Management:


Bladder Pattern: Normal


Voiding Method: Urinal, Diaper


Bladder Management: Supervision


Other Intervention:: 0





- Transfers:


Transfers: Minimal Assistance





- ADL's:


ADL's: Maximal Assistance





- Pain Management:


Other Intervention:: MSIR q6h PRN





- Patient/Family Teaching:


Other Intervention:: Care post Laminectomy, spinal and safety precautions





- Goals/Time Frame:


Comment: Per multidisciplinary care plan and goals





- Provider:


Registered Nurse:: Aydee Garcia





Physical Therapy





- Bed Mobility


Bed Mobility: Verbal Cues, Contact Guard, Minimal Assistance





- Transfers


Wheelchair to Mat: Verbal Cues, Minimal Assistance


Sit to Stand: Verbal Cues, Moderate Assistance


Comment: impulsive with transfers; requires assisatnce to set up wheelchair and 

assistance to ensure safety.  -pull to stand using B knee immobilizers in // 

bars





- Ambulation


Level of Assistance: Dependent


Comment: -static standing in // bars at this time -- plan to progress as able 

this week





- Stair Negotiation


Stairs: Level of Assistance: Not Tested





- Standing Balance


Static Stand: Minimal Assistance


Comment: // bars, B knee immobilizers, uses BUE for support, limited LE weigh 

tbearing noted with limited LE activation





- Pain


Pain (assessed during therapy session): 5


Alleviating Techniques: Medication, Distraction, Exercise


Comment: cervical pain at surgical site





- Insight/Carryover


Insight/Carryover: Poor





- Patient/Family Education


Comment: -education completed on: therapy schedule, therapy goals, postural 

control, use of [place] J collar, integumentary changes, sensation changes, 

realistic goals, WC mobility, use of brakes, use of call bell.  -patient 

expressed to PT that he is waiting for the nerve medication he gets to work so 

that he can again begin to feel his lower extremities and regain control; this 

writer educated patient on purpose of his neurotin and explained to patient that

some of the impairments he is experience including sensation and motor impair

ments is likely being caused by the injury at his neck due to compression on his

spinal cord from the abscess





- Assessment/Plan


Assessment: Patients mood continues to fluctuate largely from start of session 

to end of session with changes from anger and agitation to jovial laughing and 

joking.  Patient lacks insight/understanding into breadth and severity of his 

injuries and on the impact it can have on his long term mobility; patient with 

unrealistic goals to return to riding his bike and ambulating at the mall with 

family prior to [place] of this year.  Patient is making slow but steady 

progress in therapy as he is learning to mobilize with use of BUEs and learning 

how to manage spasm and postural control.  Pt continues to require assistance 

for set-up of all transfers and is able to tolerate standing frame for 15 

minutes and static standing in // bars with B knee immobilizers and mod A for 1-

2 minutes.  Patient remains limited by impaired sensation, impaired strength, 

impaired proprioception, impaired insight, impaired mood, impaired safety and 

hypertonicity.  PT recommends continued skilled PT to continue addressing 

skilled needs s/p spinal cord injury.  PT recommends discharge to [facility] as patient

will require further medical and rehabilitation management prior to home 

discharge at a  level.





- Goals


Timeframe: 7 days


Goals: -TRANSFERS: lateral transfers without board with CG to each side with min

A for set-up.  -TRANSFERS: SPT with max A of 1 and mod A of 2nd person for 

safety.  -BED MOBILITY: rolling L w/ S.  -BED MOBILITY: rolling R w/ S.  -BED 

MOBILITY: supine to sit w/ S.  -BED MOBILITY: sit to supine w/ min A with use of

appropriate assistive/lifting device.  -BED MOBILITY: supine to/from long 

sitting w/ mod I with use of bed rails.  -BALANCE: dynamic long sitting with 

alternating UE support: x 10 minutes with supervision.  -BALANCE: sitting edge 

of bed with posterior UE propping x 15 minutes with supervision.  -BALANCE: 

sitting edge of bed with UE support lateral to patient x 5 minutes with min A.  

-BALANCE: static standing with min A of 1 with B knee immobilizers x 3 minutes. 

-GAIT: begin to take steps with B knee immobilizers with max A in // bars.  -

WHEELCHAIR: propel manual wheelchair x 200 feet on all surfaces with BUE with S.

 -WHEELCHAIR: manage brakes & seatbelt on manual WC with supervision without 

cueing.  -WHEELCHAIR: perform pressure relief with cueing with CGA and 

assistance to ensure stability of wheelchair.  -EDUCATION: care-taker to assist 

patient with bed/mat mobility and positioning with contact guard assistance from

therapist





- Provider


Physical Therapist:: Elvia Bautista


License Number:: [id number]





Occupational Therapy





- Arousal/Attention/Orientation


Level of Consciousness: Awake, Alert


Patient Orientation: Person, Place, Time, Appropriate to Age, Appropriate to 

Situation





- ADL/IADL


Self Feeding: Independent


Grooming: Set-up Help


Bathing-Upper Ext: Set-up Help


Bathing-Lower Ext: Moderate Assistance


Dressing-Upper Ext: Independent, Set-up Help


Dressing-Lower Ext: Verbal Cues, Set-up Help, Minimal Assistance, Moderate 

Assistance


Comment: --uses aadapive devices--dressing stick, sockaide, reacher; pt needs 

continue training to increase carryover





- Sitting Balance


Static Sitting: Contact Guard Assist


Dynamic Sitting: Reaches across midline, Reaches out of base of support, Reaches

within base of support, Minimal Assistance


Comment: setaed unsupported





- Transfers


Wheelchair to Bed Transfers: Verbal Cues, Set-up Help, Contact Guard, Minimal 

Assistance


Comment: with slide board or lateral transfer after setup





- Wheelchair Management


Level of Assistance: Modified Independent


Distance (ft.): 150





- Upper Extremity Status


Right Upper Extremity Comment: AROM is WNLS, 4/5.  R : 35-lbs.  R tripod: 7-

lbs.  R lateral: 6-lbs


Left Upper Extremity Comment: AROM is WNLS, 4/5.  L : 30-lbs.  L tripod: 7-

lbs.  L: lateral 5-lbs





- Pain


Pain (assessed during therapy session): 0





- Insight/Carryover


Insight/Carryover: Fair





- Patient/Family Education


Comment: -rehab/OT goals, limitations & plan of care.  -self care, 

transfers/mobility training uisng adaptive/compensatory strategies.  -inhibitory

strategies, weightbearng for spasms & flexor tone in BLES, extensor tone in 

trunk.  -w/c propulsion/management.  -pressure relief techniques in w/c & bed; 

continued education.  -adaptive devices: reacher, dressing for lower body 

dressing, leglifter for bed mobility/positioning BLES while in bed.  -w/c & bed 

positioning.  -skin checks/inspections B heel, sacral area





- Assessment/Plan


Assessment: Pt is a 33 year old R handed male with dx: cervical abcess, 

osteomylelitis s/p C5-C6 and C6-C7 laminectomy.  *Precautions: falls, cervical 

collar at all times, spinal precautions, pressure relief considerations,.  

+spasms in BLES & trunk, pressure relief strategies.  Pt limited by the 

following impairments:  ++spasms in B LEs & trunk.  -impaired BUES  core/trunk 

strength.  -impaired BLES strength, involuntary movements.  -impaired safety.  -

impaired activity tolerance/endurance--which imapct on functional performance of

self care, transfers/mobility and IAdls. Pt will continue skilled Occupational 

Therapy T to adddress functional impairments to maxmize function in self 

care/mobility using compensatory strategies.  Pt will benefit from intensive 

acute rehab to maximize function in self care, transfers/mobility.  Pt continues

to demostrate increased overall function in lower bodyself care, activity 

tolerance & BUES  during today's treatment session.  Pt requires less rest 

breaks to complete therapeutic tasks.  Pt continues to benefit from inhibitory 

stretches/weightbearing prior to functional activities.  Pt continues to 

demonstrate increased overall function in self care, activity tolerance during 

today's treatment session.  *Pt with fair adherance to spinal precautions as 

evidenced turning haed and loosening [place] J collar.  Pt motivated to complete 

therapy tasks.  Goals to be adjusted & updated accordingly due to complexity of 

this case.  Pt may benefit from GILLIAN 2' extent of deficts and time needed to p

rogress to established goals--to reassess needs.





- Goals


Timeframe: 8 days


Comment: *LOWER BODY DRESSING: Min assist and verbal cues with assistive 

devices.  *TRANSFERS:w/c<->bed with Supervision/CS and verbal cues setup of 

slide board; min assist slide board to commode OR  CG/CS assist lateral 

transfers.  *BED MOBILITY: supine->sit with Close S & verbal cues with bedrails,

sit->supine with min assist and verbal cues with bedrails, leglifter.  *BATHING:

moderate assist and verbal cues--sponge bathe.  *STATIC SItTING: Mod I with BUE 

support, DYNAMIC with min displacement Bilaterally, anteriorly/posteriorly with 

min assist and verbal cues--compensatory strategies.  *TOILETING: Mod assist & 

verbal cues.  *PRESSURE RELIEF COMPLETION WITH CS and verbal cues while in bed 

and/or w/c





- Provider


Occupational Therapist:: Yuliya Cruz


License Number: [id number]





Recreational Therapy





- Participation


Participation: Participates in Individual and/or Group Sessions





- Attendance


Attendance: 3-5 times per week





- Activities


Leisure Activities: Cards and Games





- Socialization


Level of Socialization: Initiates/interacts freely with care givers and peer





- Diversional Time


Diversional Time: video games, dominoes, television





- Assessment


Assessment/Plan: Pt is agreeable to participate in recreation therapy sessions. 

Pt will self-propel to recreation room and participate in dominoes task and 

oriented to rummikub and other leisure tasks. Pt verbalizes enjoyment and is 

independent with leisure tasks following demonstration. Pt plays video games 

during his free time for diversion. Pt will continue to benefit from 

participating in recreation therapy sessions following encouragement.


Problems Currently Limiting Participation: spinal precautions, neck collar brace

to be worn at all times, B/L LE weakness, decrease leisure awareness level, 

decrease leisure awareness level, decrease activity tolerance level


Goals and Time Frame: Pt will be encouraged to participate in 1:1 and group 

recreation therapy sessions in wheelchair or at bedside 3-5x week to improve 

activity tolerance level, leisure awareness level, diversion, and attention to 

task by date of discharge.





- Provider


Therapist: Gisela Garza





Nutrition





- Current Diet


Current Diet/Supplement/Feedings: Regular diet





- Appetite


Percent Meal Consumed: %





- Comments


Comments: Pt reports constipation x 1 week.





- Assessment/Goals/Time Frame


Assessments/Goals/Time Frame: Pt at moderate nutritional risk.  goals:Pt to 

consume at least 75% meals without GI upset x 5-7 days.(met,continue).  Pt to 

have resolution of constipation by follow up.(partially met, continue).  Follow-

up due on 10/17/2018





- Provider


Provider: Britt vAitia





Case Management





- Psychosocial Assessment


Support Systems: Juliet Carisa (significant other)- [phone]


Psychological Interventions/Needs: Patient is AAOx3 and able to verbalize needs.


Discharge Concerns: Subacute is recommended for patient and patient will likely 

not be agreeable with this recommendation.


Patient/Family Meeting: CM met with patient and rehab team.


Intervention/Goal/Outcome: 1. Goal: contact guard 2. Plan: discuss possibility 

GILLIAN with patient or the capabilities of significant other to provide and care 

for him in the event he decides to go home 3. DME needs 4. f/u appointments 5. 

continued auth from insurance 6. continued emotional support





- Discharge Plan


Discharge Plan: Subacute care





- Provider


Provider: Aranza Rosenthal


License Number: [id number]





Rehabilitation Plan





- Treatment Plan


Treatment Plan: Physical Therapy, Occupational Therapy, Dietary, Pain 

Management, Patient/Family Education





- Discharge Plan


Estimated Date of Discharge: 12/20/18


Discharge to: Subacute

## 2018-12-11 NOTE — CP.PCM.PN
Subjective





- Date & Time of Evaluation


Date of Evaluation: 12/11/18


Time of Evaluation: 13:54





- Subjective


Subjective: 





Patient seen in the room


doing ok


no pain


bowel magic bullet came and will restart regimen





Objective





- Vital Signs/Intake and Output


Vital Signs (last 24 hours): 


                                        











Temp Pulse Resp BP Pulse Ox


 


 98.1 F   91 H  20   130/71   97 


 


 12/11/18 09:38  12/11/18 09:38  12/11/18 09:38  12/11/18 09:38  12/11/18 09:38











- Medications


Medications: 


                               Current Medications





Acetaminophen (Tylenol 325mg Tab)  650 mg PO Q6 PRN


   PRN Reason: for pain scale 3-4


Aripiprazole (Abilify)  5 mg PO HS [facility]


   Last Admin: 12/10/18 21:37 Dose:  5 mg


Baclofen (Lioresal)  10 mg PO TID [facility]


   Last Admin: 12/11/18 12:27 Dose:  10 mg


Bisacodyl (Dulcolax)  10 mg [place] DAILY PRN


   PRN Reason: Constipation


   Last Admin: 12/11/18 06:18 Dose:  10 mg


Dexamethasone (Decadron)  4 mg PO DAILY [facility]


   Stop: 12/12/18 09:01


   Last Admin: 12/11/18 08:44 Dose:  4 mg


Dexamethasone (Decadron)  2 mg PO DAILY [facility]


   Stop: 12/15/18 09:01


Docusate Sodium (Colace)  100 mg PO BID [facility]


   Last Admin: 12/11/18 08:44 Dose:  100 mg


Gabapentin (Neurontin)  600 mg PO HS [facility]


   Last Admin: 12/10/18 21:39 Dose:  600 mg


Home Med (Enemeez)  5 ml ID [facility]


Cefepime HCl 2 gm/ Sodium (Chloride)  100 mls @ 100 mls/hr IVPB 

Q8@0600,1400,2200 [facility]


   Last Admin: 12/11/18 06:17 Dose:  100 mls/hr


Vancomycin HCl 1 gm/ Sodium (Chloride)  250 mls @ 166.667 mls/hr IVPB Q12H [facility]


   Last Admin: 12/11/18 12:18 Dose:  166.667 mls/hr


Lactic Acid (Lac-Hydrin 12% Lotion (225 G))  1 applic TOP 0600,1800 [facility]


   Last Admin: 12/11/18 06:17 Dose:  1 applic


Lactulose (Enulose)  20 gm PO DAILY PRN


   PRN Reason: Constipation


   Last Admin: 12/10/18 16:57 Dose:  20 gm


Oxycodone HCl (Oxycodone Immediate Release Tab)  5 mg PO Q6 PRN


   PRN Reason: pain 5-10/10


   Last Admin: 12/03/18 11:25 Dose:  5 mg


Tizanidine HCl (Zanaflex)  8 mg PO HS [facility]


   Last Admin: 12/10/18 21:39 Dose:  8 mg











- Labs


Labs: 


                                        





                                 12/09/18 06:00 





                                 12/09/18 06:00

## 2018-12-12 NOTE — CP.PCM.PN
Subjective





- Date & Time of Evaluation


Date of Evaluation: 12/12/18


Time of Evaluation: 08:00





- Subjective


Subjective: 





afeb


still non ambulatory





Objective





- Vital Signs/Intake and Output


Vital Signs (last 24 hours): 


                                        











Temp Pulse Resp BP Pulse Ox


 


 96.8 F L  82   18   124/75   97 


 


 12/12/18 09:13  12/12/18 09:13  12/12/18 09:13  12/12/18 09:13  12/12/18 09:13











- Medications


Medications: 


                               Current Medications





Acetaminophen (Tylenol 325mg Tab)  650 mg PO Q6 PRN


   PRN Reason: for pain scale 3-4


Aripiprazole (Abilify)  5 mg PO HS [facility]


   Last Admin: 12/11/18 22:05 Dose:  5 mg


Baclofen (Lioresal)  10 mg PO TID [facility]


   Last Admin: 12/12/18 12:44 Dose:  10 mg


Bisacodyl (Dulcolax)  10 mg [place] DAILY PRN


   PRN Reason: Constipation


   Last Admin: 12/11/18 06:18 Dose:  10 mg


Dexamethasone (Decadron)  2 mg PO DAILY [facility]


   Stop: 12/15/18 09:01


Docusate Sodium (Colace)  100 mg PO BID [facility]


   Last Admin: 12/12/18 08:03 Dose:  100 mg


Gabapentin (Neurontin)  600 mg PO HS [facility]


   Last Admin: 12/11/18 22:05 Dose:  600 mg


Home Med (Enemeez)  5 ml [place] 0600 [facility]


   Last Admin: 12/12/18 05:44 Dose:  5 ml


Cefepime HCl 2 gm/ Sodium (Chloride)  100 mls @ 100 mls/hr IVPB Q8@0

600,1400,2200 [facility]


   Last Admin: 12/12/18 05:24 Dose:  100 mls/hr


Vancomycin HCl 1 gm/ Sodium (Chloride)  250 mls @ 166.667 mls/hr IVPB Q12H [facility]


   Last Admin: 12/12/18 12:38 Dose:  166.667 mls/hr


Lactic Acid (Lac-Hydrin 12% Lotion (225 G))  1 applic TOP 0600,1800 [facility]


   Last Admin: 12/12/18 05:24 Dose:  1 applic


Lactulose (Enulose)  20 gm PO DAILY PRN


   PRN Reason: Constipation


   Last Admin: 12/10/18 16:57 Dose:  20 gm


Oxycodone HCl (Oxycodone Immediate Release Tab)  5 mg PO Q6 PRN


   PRN Reason: pain 5-10/10


   Last Admin: 12/03/18 11:25 Dose:  5 mg


Tizanidine HCl (Zanaflex)  8 mg PO HS [facility]


   Last Admin: 12/11/18 22:05 Dose:  8 mg











- Labs


Labs: 


                                        





                                 12/09/18 06:00 





                                 12/09/18 06:00 











- Constitutional


Appears: Non-toxic, Chronically Ill





- Head Exam


Head Exam: NORMOCEPHALIC





- Eye Exam


Eye Exam: PERRL





- ENT Exam


ENT Exam: Mucous Membranes Dry





- Neck Exam


Neck Exam: absent: Thyromegaly





- Respiratory Exam


Respiratory Exam: Decreased Breath Sounds





- Cardiovascular Exam


Cardiovascular Exam: REGULAR RHYTHM





- GI/Abdominal Exam


GI & Abdominal Exam: Distended, Soft





- Rectal Exam


Rectal Exam: Deferred





-  Exam


 Exam: NORMAL INSPECTION





- Extremities Exam


Extremities Exam: absent: Pedal Edema





- Back Exam


Back Exam: absent: CVA tenderness (L), CVA tenderness (R)





- Neurological Exam


Neurological Exam: Alert, Awake, Oriented x3


Neuro motor strength exam: Left Upper Extremity: 5, Right Upper Extremity: 5, 

Left Lower Extremity: 3, Right Lower Extremity: 3





- Psychiatric Exam


Psychiatric exam: Depressed





- Skin


Skin Exam: Dry





Assessment and Plan


(1) Back pain


Status: Acute   





(2) Discitis of cervical region


Status: Acute   





(3) Epidural abscess


Status: Acute   





(4) Osteomyelitis of cervical spine


Status: Acute

## 2018-12-12 NOTE — CP.PCM.PN
Subjective





- Date & Time of Evaluation


Date of Evaluation: 12/12/18


Time of Evaluation: 17:22





- Subjective


Subjective: 





Patient seen earlier in therapy


had a successful bowel movement after the magic bullet and feels better


continues to work hard


able to position feet better and improved standing


not able to take a step yet.


continue current meds





Objective





- Vital Signs/Intake and Output


Vital Signs (last 24 hours): 


                                        











Temp Pulse Resp BP Pulse Ox


 


 96.8 F L  82   18   124/75   97 


 


 12/12/18 09:13  12/12/18 09:13  12/12/18 09:13  12/12/18 09:13  12/12/18 09:13











- Medications


Medications: 


                               Current Medications





Acetaminophen (Tylenol 325mg Tab)  650 mg PO Q6 PRN


   PRN Reason: for pain scale 3-4


Aripiprazole (Abilify)  5 mg PO HS [facility]


   Last Admin: 12/11/18 22:05 Dose:  5 mg


Baclofen (Lioresal)  10 mg PO TID [facility]


   Last Admin: 12/12/18 17:18 Dose:  10 mg


Bisacodyl (Dulcolax)  10 mg [place] DAILY PRN


   PRN Reason: Constipation


   Last Admin: 12/11/18 06:18 Dose:  10 mg


Dexamethasone (Decadron)  2 mg PO DAILY [facility]


   Stop: 12/15/18 09:01


Docusate Sodium (Colace)  100 mg PO BID [facility]


   Last Admin: 12/12/18 17:21 Dose:  100 mg


Gabapentin (Neurontin)  600 mg PO [facility]


   Last Admin: 12/11/18 22:05 Dose:  600 mg


Home Med (Enemeez)  5 ml [place] 0600 [facility]


   Last Admin: 12/12/18 05:44 Dose:  5 ml


Cefepime HCl 2 gm/ Sodium (Chloride)  100 mls @ 100 mls/hr IVPB Q

8@0600,1400,2200 [facility]


   Last Admin: 12/12/18 17:20 Dose:  100 mls/hr


Vancomycin HCl 1 gm/ Sodium (Chloride)  250 mls @ 166.667 mls/hr IVPB Q12H [facility]


   Last Admin: 12/12/18 12:38 Dose:  166.667 mls/hr


Lactic Acid (Lac-Hydrin 12% Lotion (225 G))  1 applic TOP 0600,1800 [facility]


   Last Admin: 12/12/18 17:18 Dose:  1 applic


Lactulose (Enulose)  20 gm PO DAILY PRN


   PRN Reason: Constipation


   Last Admin: 12/10/18 16:57 Dose:  20 gm


Oxycodone HCl (Oxycodone Immediate Release Tab)  5 mg PO Q6 PRN


   PRN Reason: pain 5-10/10


   Last Admin: 12/03/18 11:25 Dose:  5 mg


Tizanidine HCl (Zanaflex)  8 mg PO HS [facility]


   Last Admin: 12/11/18 22:05 Dose:  8 mg











- Labs


Labs: 


                                        





                                 12/09/18 06:00 





                                 12/09/18 06:00

## 2018-12-13 NOTE — CP.PCM.PN
Subjective





- Date & Time of Evaluation


Date of Evaluation: 12/13/18


Time of Evaluation: 09:47





- Subjective


Subjective: 





CONTINUES TO SHOW CLINICAL IMPROVEMENT WITH PT/OT


STILL UNABLE TO AMBULATE


URINE AND BOWEL MOVEMENTS BETTER


WILL CONTINUE CURRENT RX


REPEAT LABS IN A FEW DAYS





Objective





- Vital Signs/Intake and Output


Vital Signs (last 24 hours): 


                                        











Temp Pulse Resp BP Pulse Ox


 


 97.1 F L  78   19   129/78   98 


 


 12/13/18 08:28  12/13/18 08:28  12/13/18 08:28  12/13/18 08:28  12/13/18 08:28











- Medications


Medications: 


                               Current Medications





Acetaminophen (Tylenol 325mg Tab)  650 mg PO Q6 PRN


   PRN Reason: for pain scale 3-4


Aripiprazole (Abilify)  5 mg PO [facility]


   Last Admin: 12/12/18 21:37 Dose:  5 mg


Baclofen (Lioresal)  10 mg PO TID [facility]


   Last Admin: 12/13/18 08:04 Dose:  10 mg


Bisacodyl (Dulcolax)  10 mg [place] DAILY PRN


   PRN Reason: Constipation


   Last Admin: 12/11/18 06:18 Dose:  10 mg


Dexamethasone (Decadron)  2 mg PO DAILY [facility]


   Stop: 12/15/18 09:01


   Last Admin: 12/13/18 08:04 Dose:  2 mg


Docusate Sodium (Colace)  100 mg PO BID [facility]


   Last Admin: 12/13/18 08:04 Dose:  100 mg


Gabapentin (Neurontin)  600 mg PO [facility]


   Last Admin: 12/12/18 21:37 Dose:  600 mg


Home Med (Enemeez)  5 ml [place] 0600 [facility]


   Last Admin: 12/13/18 05:27 Dose:  5 ml


Cefepime HCl 2 gm/ Sodium (Chloride)  100 mls @ 100 mls/hr IVPB 

Q8@0600,1400,2200 [facility]


   Last Admin: 12/13/18 05:11 Dose:  100 mls/hr


Vancomycin HCl 1 gm/ Sodium (Chloride)  250 mls @ 166.667 mls/hr IVPB Q12H [facility]


   Last Admin: 12/13/18 00:12 Dose:  166.667 mls/hr


Lactic Acid (Lac-Hydrin 12% Lotion (225 G))  1 applic TOP 0600,1800 [facility]


   Last Admin: 12/13/18 05:27 Dose:  1 applic


Lactulose (Enulose)  20 gm PO DAILY PRN


   PRN Reason: Constipation


   Last Admin: 12/10/18 16:57 Dose:  20 gm


Oxycodone HCl (Oxycodone Immediate Release Tab)  5 mg PO Q6 PRN


   PRN Reason: pain 5-10/10


   Last Admin: 12/03/18 11:25 Dose:  5 mg


Tizanidine HCl (Zanaflex)  8 mg PO HS [facility]


   Last Admin: 12/12/18 21:37 Dose:  8 mg











- Labs


Labs: 


                                        





                                 12/09/18 06:00 





                                 12/09/18 06:00

## 2018-12-13 NOTE — CP.PCM.PN
Subjective





- Date & Time of Evaluation


Date of Evaluation: 12/13/18


Time of Evaluation: 18:05





- Subjective


Subjective: 





Patient seen in the room. In good spirits


staff continues to report improved effort and attitude


he was able to  the standing frame today without leaning on the support


attempts to progress into the parallel bars were successful as well with ability

to weight bear without excessive tone kicking in


Continue current care





Objective





- Vital Signs/Intake and Output


Vital Signs (last 24 hours): 


                                        











Temp Pulse Resp BP Pulse Ox


 


 97.1 F L  78   19   129/78   98 


 


 12/13/18 08:28  12/13/18 08:28  12/13/18 08:28  12/13/18 08:28  12/13/18 08:28











- Medications


Medications: 


                               Current Medications





Acetaminophen (Tylenol 325mg Tab)  650 mg PO Q6 PRN


   PRN Reason: for pain scale 3-4


Aripiprazole (Abilify)  5 mg PO [facility]


   Last Admin: 12/12/18 21:37 Dose:  5 mg


Baclofen (Lioresal)  10 mg PO TID [facility]


   Last Admin: 12/13/18 17:54 Dose:  10 mg


Bisacodyl (Dulcolax)  10 mg IN DAILY PRN


   PRN Reason: Constipation


   Last Admin: 12/11/18 06:18 Dose:  10 mg


Dexamethasone (Decadron)  2 mg PO DAILY [facility]


   Stop: 12/15/18 09:01


   Last Admin: 12/13/18 08:04 Dose:  2 mg


Docusate Sodium (Colace)  100 mg PO BID [facility]


   Last Admin: 12/13/18 17:51 Dose:  100 mg


Gabapentin (Neurontin)  600 mg PO [facility]


   Last Admin: 12/12/18 21:37 Dose:  600 mg


Home Med (Enemeez)  5 ml IN 0600 [facility]


   Last Admin: 12/13/18 05:27 Dose:  5 ml


Cefepime HCl 2 gm/ Sodium (Chloride)  100 mls @ 100 mls/hr IVPB 

Q8@0600,1400,2200 [facility]


   Last Admin: 12/13/18 14:50 Dose:  100 mls/hr


Vancomycin HCl 1 gm/ Sodium (Chloride)  250 mls @ 166.667 mls/hr IVPB Q12H [facility]


   Last Admin: 12/13/18 12:11 Dose:  166.667 mls/hr


Lactic Acid (Lac-Hydrin 12% Lotion (225 G))  1 applic TOP 0600,1800 [facility]


   Last Admin: 12/13/18 17:54 Dose:  1 applic


Lactulose (Enulose)  20 gm PO DAILY PRN


   PRN Reason: Constipation


   Last Admin: 12/10/18 16:57 Dose:  20 gm


Oxycodone HCl (Oxycodone Immediate Release Tab)  5 mg PO Q6 PRN


   PRN Reason: pain 5-10/10


   Last Admin: 12/03/18 11:25 Dose:  5 mg


Tizanidine HCl (Zanaflex)  8 mg PO HS [facility]


   Last Admin: 12/12/18 21:37 Dose:  8 mg











- Labs


Labs: 


                                        





                                 12/09/18 06:00 





                                 12/09/18 06:00

## 2018-12-13 NOTE — CP.PCM.PN
Subjective





- Date & Time of Evaluation


Date of Evaluation: 12/13/18


Time of Evaluation: 11:03





- Subjective


Subjective: 





Neurology Follow-Up Note:





Mr. Ken was evaluated this afternoon sitting OOB in chair.  He has no 

complaints today.  Admits that his left hand and left foot paresthesias have 

been improving since starting the higher dose of Gabapentin.  He still has [place]

J-Collar on.  Pt denies headache, dizziness, neck pain, visual changes, chest 

pain, shortness of breath, abd pain, n/v/d.





Objective





- Vital Signs/Intake and Output


Vital Signs (last 24 hours): 


                                        











Temp Pulse Resp BP Pulse Ox


 


 97.1 F L  78   19   129/78   98 


 


 12/13/18 08:28  12/13/18 08:28  12/13/18 08:28  12/13/18 08:28  12/13/18 08:28











- Medications


Medications: 


                               Current Medications





Acetaminophen (Tylenol 325mg Tab)  650 mg PO Q6 PRN


   PRN Reason: for pain scale 3-4


Aripiprazole (Abilify)  5 mg PO HS [facility]


   Last Admin: 12/12/18 21:37 Dose:  5 mg


Baclofen (Lioresal)  10 mg PO TID [facility]


   Last Admin: 12/13/18 08:04 Dose:  10 mg


Bisacodyl (Dulcolax)  10 mg [place] DAILY PRN


   PRN Reason: Constipation


   Last Admin: 12/11/18 06:18 Dose:  10 mg


Dexamethasone (Decadron)  2 mg PO DAILY [facility]


   Stop: 12/15/18 09:01


   Last Admin: 12/13/18 08:04 Dose:  2 mg


Docusate Sodium (Colace)  100 mg PO BID [facility]


   Last Admin: 12/13/18 08:04 Dose:  100 mg


Gabapentin (Neurontin)  600 mg PO HS [facility]


   Last Admin: 12/12/18 21:37 Dose:  600 mg


Home Med (Enemeez)  5 ml [place] 0600 [facility]


   Last Admin: 12/13/18 05:27 Dose:  5 ml


Cefepime HCl 2 gm/ Sodium (Chloride)  100 mls @ 100 mls/hr IVPB Q8@0600,14

00,2200 [facility]


   Last Admin: 12/13/18 05:11 Dose:  100 mls/hr


Vancomycin HCl 1 gm/ Sodium (Chloride)  250 mls @ 166.667 mls/hr IVPB Q12H [facility]


   Last Admin: 12/13/18 00:12 Dose:  166.667 mls/hr


Lactic Acid (Lac-Hydrin 12% Lotion (225 G))  1 applic TOP 0600,1800 [facility]


   Last Admin: 12/13/18 05:27 Dose:  1 applic


Lactulose (Enulose)  20 gm PO DAILY PRN


   PRN Reason: Constipation


   Last Admin: 12/10/18 16:57 Dose:  20 gm


Oxycodone HCl (Oxycodone Immediate Release Tab)  5 mg PO Q6 PRN


   PRN Reason: pain 5-10/10


   Last Admin: 12/03/18 11:25 Dose:  5 mg


Tizanidine HCl (Zanaflex)  8 mg PO HS [facility]


   Last Admin: 12/12/18 21:37 Dose:  8 mg











- Labs


Labs: 


                                        





                                 12/09/18 06:00 





                                 12/09/18 06:00 











- Constitutional


Appears: Well, Non-toxic, No Acute Distress





- Head Exam


Head Exam: ATRAUMATIC, NORMAL INSPECTION, NORMOCEPHALIC





- Eye Exam


Eye Exam: EOMI, Normal appearance, PERRL


Pupil Exam: PERRL





- ENT Exam


ENT Exam: Mucous Membranes Moist





- Neck Exam


Neck Exam: Normal Inspection


Additional comments: 





[place] J-Collar on





- Respiratory Exam


Respiratory Exam: NORMAL BREATHING PATTERN





- Extremities Exam


Extremities Exam: absent: Calf Tenderness, Full ROM (decreased rom to BLE but 

improving), Pedal Edema





- Neurological Exam


Neurological Exam: Abnormal Gait, Alert, Awake, Oriented x3.  absent: Reflexes 

Normal


Neuro motor strength exam: Left Upper Extremity: 5 ( 5/5), Right Upper 

Extremity: 5 ( 5/5), Left Lower Extremity: 4, Right Lower Extremity: 5


Additional comments: 





Speech clear, fluid


No facial asymmetry


Hypertonicity resolved compared to last exam by me


+hyperflexia noted to BLE


Sensation intact and equal.  Improving sensation over T12 area.





- Psychiatric Exam


Psychiatric exam: Normal Affect, Normal Mood





- Skin


Skin Exam: Normal Color





Assessment and Plan


(1) Epidural abscess


Assessment & Plan: 


Mr. Ken is still improving and looks very well.  





-Paresthesias to left hand and left foot likely neuropathic.  Continue Neurontin

to 600 mg PO HS.  


-Continue PT/OT therapy.


-Decadron taper to end on 12/15/18.


-Removal of [place] J-collar deferred to neurosurgery.


-Please notify neuro team of any acute changes in condition.


-We will continue to follow the pt while in rehab.


-Plan discussed with nursing.





Case discussed in detail with Dr. Houston


Status: Acute

## 2018-12-14 LAB
ALBUMIN SERPL-MCNC: 3.3 G/DL (ref 3.5–5)
ALBUMIN/GLOB SERPL: 1.1 {RATIO} (ref 1–2.1)
ALT SERPL-CCNC: 39 U/L (ref 21–72)
AST SERPL-CCNC: 24 U/L (ref 17–59)
BASOPHILS # BLD AUTO: 0.1 K/UL (ref 0–0.2)
BASOPHILS NFR BLD: 0.8 % (ref 0–2)
BUN SERPL-MCNC: 24 MG/DL (ref 9–20)
CALCIUM SERPL-MCNC: 8.9 MG/DL (ref 8.4–10.2)
EOSINOPHIL # BLD AUTO: 0.4 K/UL (ref 0–0.7)
EOSINOPHIL NFR BLD: 4.3 % (ref 0–4)
ERYTHROCYTE [DISTWIDTH] IN BLOOD BY AUTOMATED COUNT: 14.2 % (ref 11.5–14.5)
ERYTHROCYTE [SEDIMENTATION RATE] IN BLOOD: 28 MM/HR (ref 0–15)
GFR NON-AFRICAN AMERICAN: > 60
HGB BLD-MCNC: 11.8 G/DL (ref 12–18)
LYMPHOCYTES # BLD AUTO: 1.4 K/UL (ref 1–4.3)
LYMPHOCYTES NFR BLD AUTO: 15.4 % (ref 20–40)
MCH RBC QN AUTO: 30.9 PG (ref 27–31)
MCHC RBC AUTO-ENTMCNC: 32.8 G/DL (ref 33–37)
MCV RBC AUTO: 94.3 FL (ref 80–94)
MONOCYTES # BLD: 1 K/UL (ref 0–0.8)
MONOCYTES NFR BLD: 11.2 % (ref 0–10)
NEUTROPHILS # BLD: 6.4 K/UL (ref 1.8–7)
NEUTROPHILS NFR BLD AUTO: 68.3 % (ref 50–75)
NRBC BLD AUTO-RTO: 0 % (ref 0–0)
PLATELET # BLD: 194 K/UL (ref 130–400)
PMV BLD AUTO: 6.9 FL (ref 7.2–11.7)
RBC # BLD AUTO: 3.8 MIL/UL (ref 4.4–5.9)
WBC # BLD AUTO: 9.3 K/UL (ref 4.8–10.8)

## 2018-12-14 NOTE — CP.PCM.PN
Subjective





- Date & Time of Evaluation


Date of Evaluation: 12/14/18


Time of Evaluation: 08:00





- Subjective


Subjective: 





making progress


able to stand


some hyperreflexia present


WBC decreasing off steroids


ESR slowly comningh down


cont iv rx as ordered





Objective





- Vital Signs/Intake and Output


Vital Signs (last 24 hours): 


                                        











Temp Pulse Resp BP Pulse Ox


 


 97.5 F L  71   20   124/73   98 


 


 12/14/18 07:38  12/14/18 07:38  12/14/18 07:38  12/14/18 07:38  12/14/18 07:38











- Medications


Medications: 


                               Current Medications





Acetaminophen (Tylenol 325mg Tab)  650 mg PO Q6 PRN


   PRN Reason: for pain scale 3-4


Aripiprazole (Abilify)  5 mg PO [facility]


   Last Admin: 12/13/18 21:45 Dose:  5 mg


Baclofen (Lioresal)  10 mg PO TID [facility]


   Last Admin: 12/14/18 12:36 Dose:  10 mg


Bisacodyl (Dulcolax)  10 mg [place] DAILY PRN


   PRN Reason: Constipation


   Last Admin: 12/11/18 06:18 Dose:  10 mg


Dexamethasone (Decadron)  2 mg PO DAILY [facility]


   Stop: 12/15/18 09:01


   Last Admin: 12/14/18 08:34 Dose:  2 mg


Docusate Sodium (Colace)  100 mg PO BID [facility]


   Last Admin: 12/14/18 08:34 Dose:  100 mg


Gabapentin (Neurontin)  600 mg PO [facility]


   Last Admin: 12/13/18 21:45 Dose:  600 mg


Home Med (Enemeez)  5 ml [place] 0600 [facility]


   Last Admin: 12/14/18 06:08 Dose:  5 ml


Cefepime HCl 2 gm/ Sodium (Chloride)  100 mls @ 100 mls/hr IVPB 

Q8@0600,1400,2200 [facility]


   Last Admin: 12/14/18 05:05 Dose:  100 mls/hr


Vancomycin HCl 1 gm/ Sodium (Chloride)  250 mls @ 166.667 mls/hr IVPB Q12H [facility]


   Last Admin: 12/14/18 13:30 Dose:  166.667 mls/hr


Lactic Acid (Lac-Hydrin 12% Lotion (225 G))  1 applic TOP 0600,1800 [facility]


   Last Admin: 12/14/18 06:08 Dose:  1 applic


Lactulose (Enulose)  20 gm PO DAILY PRN


   PRN Reason: Constipation


   Last Admin: 12/10/18 16:57 Dose:  20 gm


Oxycodone HCl (Oxycodone Immediate Release Tab)  5 mg PO Q6 PRN


   PRN Reason: pain 5-10/10


   Last Admin: 12/03/18 11:25 Dose:  5 mg


Tizanidine HCl (Zanaflex)  8 mg PO HS [facility]


   Last Admin: 12/13/18 21:45 Dose:  8 mg











- Labs


Labs: 


                                        





                                 12/14/18 06:00 





                                 12/14/18 06:00 











Assessment and Plan


(1) Back pain


Status: Acute   





(2) Discitis of cervical region


Status: Acute   





(3) Epidural abscess


Status: Acute   





(4) Osteomyelitis of cervical spine


Status: Acute

## 2018-12-14 NOTE — CP.PCM.PN
Subjective





- Date & Time of Evaluation


Date of Evaluation: 12/14/18


Time of Evaluation: 10:47





- Subjective


Subjective: 





Neurology Follow-Up Note:





Mr. Ken was evaluated this morning after his therapy session.  Today he 

states that he has some discomfort to right forearm and admits that his heplock 

was just removed from that site this morning.  He also c/o mild swelling to his 

ankles.  He still has [place] J-Collar on.  Pt denies headache, dizziness, neck 

pain, visual changes, chest pain, shortness of breath, calf pain/swelling, abd 

pain, n/v/d.





Objective





- Vital Signs/Intake and Output


Vital Signs (last 24 hours): 


                                        











Temp Pulse Resp BP Pulse Ox


 


 97.5 F L  71   20   124/73   98 


 


 12/14/18 07:38  12/14/18 07:38  12/14/18 07:38  12/14/18 07:38  12/14/18 07:38











- Medications


Medications: 


                               Current Medications





Acetaminophen (Tylenol 325mg Tab)  650 mg PO Q6 PRN


   PRN Reason: for pain scale 3-4


Aripiprazole (Abilify)  5 mg PO HS [facility]


   Last Admin: 12/13/18 21:45 Dose:  5 mg


Baclofen (Lioresal)  10 mg PO TID [facility]


   Last Admin: 12/14/18 08:34 Dose:  10 mg


Bisacodyl (Dulcolax)  10 mg [place] DAILY PRN


   PRN Reason: Constipation


   Last Admin: 12/11/18 06:18 Dose:  10 mg


Dexamethasone (Decadron)  2 mg PO DAILY [facility]


   Stop: 12/15/18 09:01


   Last Admin: 12/14/18 08:34 Dose:  2 mg


Docusate Sodium (Colace)  100 mg PO BID [facility]


   Last Admin: 12/14/18 08:34 Dose:  100 mg


Gabapentin (Neurontin)  600 mg PO [facility]


   Last Admin: 12/13/18 21:45 Dose:  600 mg


Home Med (Enemeez)  5 ml [place] 0600 [facility]


   Last Admin: 12/14/18 06:08 Dose:  5 ml


Cefepime HCl 2 gm/ Sodium (Chloride)  100 mls @ 100 mls/hr IVPB 

Q8@0600,1400,2200 [facility]


   Last Admin: 12/14/18 05:05 Dose:  100 mls/hr


Vancomycin HCl 1 gm/ Sodium (Chloride)  250 mls @ 166.667 mls/hr IVPB Q12H [facility]


   Last Admin: 12/14/18 00:21 Dose:  166.667 mls/hr


Lactic Acid (Lac-Hydrin 12% Lotion (225 G))  1 applic TOP 0600,1800 [facility]


   Last Admin: 12/14/18 06:08 Dose:  1 applic


Lactulose (Enulose)  20 gm PO DAILY PRN


   PRN Reason: Constipation


   Last Admin: 12/10/18 16:57 Dose:  20 gm


Oxycodone HCl (Oxycodone Immediate Release Tab)  5 mg PO Q6 PRN


   PRN Reason: pain 5-10/10


   Last Admin: 12/03/18 11:25 Dose:  5 mg


Tizanidine HCl (Zanaflex)  8 mg PO HS [facility]


   Last Admin: 12/13/18 21:45 Dose:  8 mg











- Labs


Labs: 


                                        





                                 12/14/18 06:00 





                                 12/14/18 06:00 











- Constitutional


Appears: Well, Non-toxic, No Acute Distress





- Head Exam


Head Exam: NORMAL INSPECTION





- Eye Exam


Eye Exam: EOMI, Normal appearance


Pupil Exam: PERRL





- ENT Exam


ENT Exam: Mucous Membranes Moist





- Neck Exam


Neck Exam: Normal Inspection


Additional comments: 





[place] j-collar maintained





- Respiratory Exam


Respiratory Exam: NORMAL BREATHING PATTERN





- Extremities Exam


Extremities Exam: absent: Calf Tenderness, Full ROM, Normal Inspection, Pedal 

Edema, Tenderness


Additional comments: 





neg david's sign b/l; + mild swelling noted to b/l ankles, non tender, non 

pitting.


overall decreased rom to BLE, however, much improving 





- Neurological Exam


Neurological Exam: Alert, Awake, Oriented x3


Neuro motor strength exam: Left Upper Extremity: 5, Right Upper Extremity: 5, 

Left Lower Extremity: 4, Right Lower Extremity: 5


Additional comments: 





still has hyperflexia ble


sensation intact, imrpving sensation to t12 area


no hypertonicity





- Psychiatric Exam


Psychiatric exam: Normal Affect, Normal Mood





- Skin


Skin Exam: Normal Color





Assessment and Plan


(1) Epidural abscess


Assessment & Plan: 


Mr. Ken continues to improve.





-Paresthesias to left hand and left foot likely neuropathic.  Continue Neurontin

to 600 mg PO HS.  


-Continue PT/OT therapy.


-Decadron taper to end on 12/15/18.


-Removal of [place] J-collar deferred to neurosurgery.


-Elevate BLE while in bed at night to decrease swelling to ankles


-Please notify neuro team of any acute changes in condition.


-We will continue to follow the pt while in rehab.





Case discussed in detail with Dr. Houston


Status: Acute

## 2018-12-14 NOTE — CP.PCM.PN
Subjective





- Date & Time of Evaluation


Date of Evaluation: 12/14/18


Time of Evaluation: 13:53





- Subjective


Subjective: 





TOOK 2 STEPS TODAY


FEELS BETTER





Objective





- Vital Signs/Intake and Output


Vital Signs (last 24 hours): 


                                        











Temp Pulse Resp BP Pulse Ox


 


 97.5 F L  71   20   124/73   98 


 


 12/14/18 07:38  12/14/18 07:38  12/14/18 07:38  12/14/18 07:38  12/14/18 07:38











- Medications


Medications: 


                               Current Medications





Acetaminophen (Tylenol 325mg Tab)  650 mg PO Q6 PRN


   PRN Reason: for pain scale 3-4


Aripiprazole (Abilify)  5 mg PO HS [facility]


   Last Admin: 12/13/18 21:45 Dose:  5 mg


Baclofen (Lioresal)  10 mg PO TID [facility]


   Last Admin: 12/14/18 12:36 Dose:  10 mg


Bisacodyl (Dulcolax)  10 mg [place] DAILY PRN


   PRN Reason: Constipation


   Last Admin: 12/11/18 06:18 Dose:  10 mg


Dexamethasone (Decadron)  2 mg PO DAILY [facility]


   Stop: 12/15/18 09:01


   Last Admin: 12/14/18 08:34 Dose:  2 mg


Docusate Sodium (Colace)  100 mg PO BID [facility]


   Last Admin: 12/14/18 08:34 Dose:  100 mg


Gabapentin (Neurontin)  600 mg PO [facility]


   Last Admin: 12/13/18 21:45 Dose:  600 mg


Home Med (Enemeez)  5 ml LA 0600 [facility]


   Last Admin: 12/14/18 06:08 Dose:  5 ml


Cefepime HCl 2 gm/ Sodium (Chloride)  100 mls @ 100 mls/hr IVPB 

Q8@0600,1400,2200 [facility]


   Last Admin: 12/14/18 05:05 Dose:  100 mls/hr


Vancomycin HCl 1 gm/ Sodium (Chloride)  250 mls @ 166.667 mls/hr IVPB Q12H [facility]


   Last Admin: 12/14/18 13:30 Dose:  166.667 mls/hr


Lactic Acid (Lac-Hydrin 12% Lotion (225 G))  1 applic TOP 0600,1800 [facility]


   Last Admin: 12/14/18 06:08 Dose:  1 applic


Lactulose (Enulose)  20 gm PO DAILY PRN


   PRN Reason: Constipation


   Last Admin: 12/10/18 16:57 Dose:  20 gm


Oxycodone HCl (Oxycodone Immediate Release Tab)  5 mg PO Q6 PRN


   PRN Reason: pain 5-10/10


   Last Admin: 12/03/18 11:25 Dose:  5 mg


Tizanidine HCl (Zanaflex)  8 mg PO HS [facility]


   Last Admin: 12/13/18 21:45 Dose:  8 mg











- Labs


Labs: 


                                        





                                 12/14/18 06:00 





                                 12/14/18 06:00 











- Constitutional


Appears: No Acute Distress





- Head Exam


Head Exam: ATRAUMATIC, NORMAL INSPECTION, NORMOCEPHALIC





- Eye Exam


Eye Exam: EOMI, Normal appearance, PERRL


Pupil Exam: NORMAL ACCOMODATION, PERRL





- ENT Exam


ENT Exam: Mucous Membranes Moist, Normal Exam





- Neck Exam


Neck Exam: Full ROM, Normal Inspection.  absent: Lymphadenopathy





- Respiratory Exam


Respiratory Exam: Clear to Ausculation Bilateral, NORMAL BREATHING PATTERN





- Cardiovascular Exam


Cardiovascular Exam: REGULAR RHYTHM, +S1, +S2.  absent: Murmur





- GI/Abdominal Exam


GI & Abdominal Exam: Soft, Normal Bowel Sounds.  absent: Tenderness





- Rectal Exam


Rectal Exam: NORMAL INSPECTION





- Extremities Exam


Extremities Exam: Full ROM, Normal Capillary Refill, Normal Inspection.  absent:

Joint Swelling, Pedal Edema





- Back Exam


Back Exam: NORMAL INSPECTION





- Neurological Exam


Neurological Exam: Alert, Awake, CN II-XII Intact, Oriented x3





- Psychiatric Exam


Psychiatric exam: Normal Affect, Normal Mood





- Skin


Skin Exam: Dry, Intact, Normal Color, Warm





Assessment and Plan





- Assessment and Plan (Free Text)


Assessment: 





S/P EVACUATION OF EPIDURAL ABSCESS


PARAPLEGIA IMPROVING


Plan: 





CONTINUE CURRENT RX

## 2018-12-15 NOTE — CP.PCM.PN
Subjective





- Date & Time of Evaluation


Date of Evaluation: 12/15/18


Time of Evaluation: 10:41





- Subjective


Subjective: 





FEELS BETTER


DENIES BACK PAINS


MOVING BOWELS AND URINATING WITHOUT DISCOMFORT





Objective





- Vital Signs/Intake and Output


Vital Signs (last 24 hours): 


                                        











Temp Pulse Resp BP Pulse Ox


 


 97.7 F   64   20   116/79   99 


 


 12/15/18 08:50  12/15/18 08:50  12/15/18 08:50  12/15/18 08:50  12/15/18 08:50











- Medications


Medications: 


                               Current Medications





Acetaminophen (Tylenol 325mg Tab)  650 mg PO Q6 PRN


   PRN Reason: for pain scale 3-4


Aripiprazole (Abilify)  5 mg PO [facility]


   Last Admin: 12/14/18 22:01 Dose:  5 mg


Baclofen (Lioresal)  10 mg PO TID [facility]


   Last Admin: 12/15/18 09:06 Dose:  10 mg


Bisacodyl (Dulcolax)  10 mg [place] DAILY PRN


   PRN Reason: Constipation


   Last Admin: 12/11/18 06:18 Dose:  10 mg


Docusate Sodium (Colace)  100 mg PO BID [facility]


   Last Admin: 12/15/18 09:06 Dose:  100 mg


Gabapentin (Neurontin)  600 mg PO [facility]


   Last Admin: 12/14/18 22:02 Dose:  600 mg


Home Med (Enemeez)  5 ml [place] 0600 [facility]


   Last Admin: 12/15/18 05:17 Dose:  5 ml


Cefepime HCl 2 gm/ Sodium (Chloride)  100 mls @ 100 mls/hr IVPB 

Q8@0600,1400,2200 [facility]


   Last Admin: 12/15/18 05:17 Dose:  100 mls/hr


Vancomycin HCl 1 gm/ Sodium (Chloride)  250 mls @ 166.667 mls/hr IVPB Q12H [facility]


   Last Admin: 12/15/18 00:17 Dose:  166.667 mls/hr


Lactic Acid (Lac-Hydrin 12% Lotion (225 G))  1 applic TOP 0600,1800 [facility]


   Last Admin: 12/15/18 05:31 Dose:  1 applic


Lactulose (Enulose)  20 gm PO DAILY PRN


   PRN Reason: Constipation


   Last Admin: 12/10/18 16:57 Dose:  20 gm


Oxycodone HCl (Oxycodone Immediate Release Tab)  5 mg PO Q6 PRN


   PRN Reason: pain 5-10/10


   Last Admin: 12/03/18 11:25 Dose:  5 mg


Tizanidine HCl (Zanaflex)  8 mg PO HS [facility]


   Last Admin: 12/14/18 22:02 Dose:  8 mg











- Labs


Labs: 


                                        





                                 12/14/18 06:00 





                                 12/14/18 06:00 











- Constitutional


Appears: No Acute Distress





- Head Exam


Head Exam: ATRAUMATIC, NORMAL INSPECTION, NORMOCEPHALIC





- Eye Exam


Eye Exam: EOMI, Normal appearance, PERRL


Pupil Exam: NORMAL ACCOMODATION, PERRL





- ENT Exam


ENT Exam: Mucous Membranes Moist, Normal Exam





- Neck Exam


Neck Exam: Full ROM, Normal Inspection.  absent: Lymphadenopathy





- Respiratory Exam


Respiratory Exam: Clear to Ausculation Bilateral, NORMAL BREATHING PATTERN





- Cardiovascular Exam


Cardiovascular Exam: REGULAR RHYTHM, +S1, +S2.  absent: Murmur





- GI/Abdominal Exam


GI & Abdominal Exam: Soft, Normal Bowel Sounds.  absent: Tenderness





- Rectal Exam


Rectal Exam: NORMAL INSPECTION





- Extremities Exam


Extremities Exam: Full ROM, Normal Capillary Refill, Normal Inspection.  absent:

Joint Swelling, Pedal Edema





- Back Exam


Back Exam: NORMAL INSPECTION





- Neurological Exam


Neurological Exam: Abnormal Gait, Alert, Awake, CN II-XII Intact, Oriented x3


Additional comments: 





WEAKNESS OF LOWER EXTREMITIES





- Psychiatric Exam


Psychiatric exam: Normal Affect, Normal Mood





- Skin


Skin Exam: Dry, Intact, Normal Color, Warm





Assessment and Plan





- Assessment and Plan (Free Text)


Assessment: 





DISCITIS


OSTEOMYELITIS OF SPINE


EPIDURAL ABSCESS


Plan: 





CONTINUE CURRENT RX

## 2018-12-15 NOTE — CP.PCM.PN
Subjective





- Date & Time of Evaluation


Date of Evaluation: 12/15/18


Time of Evaluation: 16:45





- Subjective


Subjective: 





Patient seen in the room


in good spirits


denies sob/cp


no significant pain issue currently


making progress everyday





Objective





- Vital Signs/Intake and Output


Vital Signs (last 24 hours): 


                                        











Temp Pulse Resp BP Pulse Ox


 


 97.7 F   64   20   116/79   99 


 


 12/15/18 08:50  12/15/18 08:50  12/15/18 08:50  12/15/18 08:50  12/15/18 08:50











- Medications


Medications: 


                               Current Medications





Acetaminophen (Tylenol 325mg Tab)  650 mg PO Q6 PRN


   PRN Reason: for pain scale 3-4


Aripiprazole (Abilify)  5 mg PO [facility]


   Last Admin: 12/14/18 22:01 Dose:  5 mg


Baclofen (Lioresal)  10 mg PO [facility]


   Last Admin: 12/15/18 12:49 Dose:  10 mg


Bisacodyl (Dulcolax)  10 mg [place] DAILY PRN


   PRN Reason: Constipation


   Last Admin: 12/11/18 06:18 Dose:  10 mg


Docusate Sodium (Colace)  100 mg PO BID [facility]


   Last Admin: 12/15/18 09:06 Dose:  100 mg


Gabapentin (Neurontin)  600 mg PO [facility]


   Last Admin: 12/14/18 22:02 Dose:  600 mg


Home Med (Enemeez)  5 ml [place] 0600 [facility]


   Last Admin: 12/15/18 05:17 Dose:  5 ml


Cefepime HCl 2 gm/ Sodium (Chloride)  100 mls @ 100 mls/hr IVPB 

Q8@0600,1400,2200 [facility]


   Last Admin: 12/15/18 15:00 Dose:  100 mls/hr


Vancomycin HCl 1 gm/ Sodium (Chloride)  250 mls @ 166.667 mls/hr IVPB Q12H [facility]


   Last Admin: 12/15/18 12:41 Dose:  166.667 mls/hr


Lactic Acid (Lac-Hydrin 12% Lotion (225 G))  1 applic TOP 0600,1800 [facility]


   Last Admin: 12/15/18 05:31 Dose:  1 applic


Lactulose (Enulose)  20 gm PO DAILY PRN


   PRN Reason: Constipation


   Last Admin: 12/10/18 16:57 Dose:  20 gm


Oxycodone HCl (Oxycodone Immediate Release Tab)  5 mg PO Q6 PRN


   PRN Reason: pain 5-10/10


   Last Admin: 12/03/18 11:25 Dose:  5 mg


Tizanidine HCl (Zanaflex)  8 mg PO HS [facility]


   Last Admin: 12/14/18 22:02 Dose:  8 mg











- Labs


Labs: 


                                        





                                 12/14/18 06:00 





                                 12/14/18 06:00

## 2018-12-16 NOTE — CP.PCM.PN
Subjective





- Date & Time of Evaluation


Date of Evaluation: 12/16/18


Time of Evaluation: 10:19





- Subjective


Subjective: 





NO APPARENT DISTRESS


MOVED BOWELS YESTERDAY





Objective





- Vital Signs/Intake and Output


Vital Signs (last 24 hours): 


                                        











Temp Pulse Resp BP Pulse Ox


 


 97.3 F L  77   20   123/75   98 


 


 12/16/18 07:58  12/16/18 07:58  12/16/18 07:58  12/16/18 07:58  12/16/18 07:58











- Medications


Medications: 


                               Current Medications





Acetaminophen (Tylenol 325mg Tab)  650 mg PO Q6 PRN


   PRN Reason: for pain scale 3-4


Aripiprazole (Abilify)  5 mg PO [facility]


   Last Admin: 12/15/18 21:48 Dose:  5 mg


Baclofen (Lioresal)  10 mg PO TID [facility]


   Last Admin: 12/16/18 08:44 Dose:  10 mg


Bisacodyl (Dulcolax)  10 mg [place] DAILY PRN


   PRN Reason: Constipation


   Last Admin: 12/11/18 06:18 Dose:  10 mg


Docusate Sodium (Colace)  100 mg PO BID [facility]


   Last Admin: 12/16/18 08:44 Dose:  100 mg


Gabapentin (Neurontin)  600 mg PO [facility]


   Last Admin: 12/15/18 21:49 Dose:  600 mg


Home Med (Enemeez)  5 ml [place] 0600 [facility]


   Last Admin: 12/16/18 06:17 Dose:  5 ml


Cefepime HCl 2 gm/ Sodium (Chloride)  100 mls @ 100 mls/hr IVPB 

Q8@0600,1400,2200 [facility]


   Last Admin: 12/16/18 06:16 Dose:  100 mls/hr


Vancomycin HCl 1 gm/ Sodium (Chloride)  250 mls @ 166.667 mls/hr IVPB Q12H [facility]


   Last Admin: 12/16/18 01:18 Dose:  166.667 mls/hr


Lactic Acid (Lac-Hydrin 12% Lotion (225 G))  1 applic TOP 0600,1800 [facility]


   Last Admin: 12/16/18 06:16 Dose:  1 applic


Lactulose (Enulose)  20 gm PO DAILY PRN


   PRN Reason: Constipation


   Last Admin: 12/10/18 16:57 Dose:  20 gm


Oxycodone HCl (Oxycodone Immediate Release Tab)  5 mg PO Q6 PRN


   PRN Reason: pain 5-10/10


   Last Admin: 12/03/18 11:25 Dose:  5 mg


Tizanidine HCl (Zanaflex)  8 mg PO HS [facility]


   Last Admin: 12/15/18 21:49 Dose:  8 mg











- Labs


Labs: 


                                        





                                 12/14/18 06:00 





                                 12/14/18 06:00 











- Constitutional


Appears: No Acute Distress





- Head Exam


Head Exam: ATRAUMATIC, NORMAL INSPECTION, NORMOCEPHALIC





- Eye Exam


Eye Exam: EOMI, Normal appearance, PERRL


Pupil Exam: NORMAL ACCOMODATION, PERRL





- ENT Exam


ENT Exam: Mucous Membranes Moist, Normal Exam





- Neck Exam


Neck Exam: Full ROM, Normal Inspection.  absent: Lymphadenopathy





- Respiratory Exam


Respiratory Exam: Clear to Ausculation Bilateral, NORMAL BREATHING PATTERN





- Cardiovascular Exam


Cardiovascular Exam: REGULAR RHYTHM, +S1, +S2.  absent: Murmur





- GI/Abdominal Exam


GI & Abdominal Exam: Soft, Normal Bowel Sounds.  absent: Tenderness





- Rectal Exam


Rectal Exam: NORMAL INSPECTION





- Extremities Exam


Extremities Exam: Full ROM, Normal Capillary Refill, Normal Inspection.  absent:

Joint Swelling, Pedal Edema





- Back Exam


Back Exam: NORMAL INSPECTION





- Neurological Exam


Neurological Exam: Alert, Awake, CN II-XII Intact, Oriented x3


Additional comments: 





STILL HAS WEAKNESS OF LOWER EXTREMITIES





- Psychiatric Exam


Psychiatric exam: Normal Affect, Normal Mood





- Skin


Skin Exam: Dry, Intact, Normal Color, Warm





Assessment and Plan





- Assessment and Plan (Free Text)


Assessment: 





DISCITIS


OSTEOMYELITIS OF SPINE


S/P EVACUATION OF EPIDURAL ABSCESS


Plan: 





CONTINUE IV ANTIBIOTIC THERAPY


PT /OT

## 2018-12-16 NOTE — CP.PCM.PN
Subjective





- Date & Time of Evaluation


Date of Evaluation: 12/16/18


Time of Evaluation: 08:00





- Subjective


Subjective: 





making progress





WBC decreasing off steroids








Objective





- Vital Signs/Intake and Output


Vital Signs (last 24 hours): 


                                        











Temp Pulse Resp BP Pulse Ox


 


 97.3 F L  77   20   123/75   98 


 


 12/16/18 07:58  12/16/18 07:58  12/16/18 07:58  12/16/18 07:58  12/16/18 07:58











- Medications


Medications: 


                               Current Medications





Acetaminophen (Tylenol 325mg Tab)  650 mg PO Q6 PRN


   PRN Reason: for pain scale 3-4


Aripiprazole (Abilify)  5 mg PO HS [facility]


   Last Admin: 12/15/18 21:48 Dose:  5 mg


Baclofen (Lioresal)  10 mg PO TID [facility]


   Last Admin: 12/16/18 12:38 Dose:  10 mg


Bisacodyl (Dulcolax)  10 mg [place] DAILY PRN


   PRN Reason: Constipation


   Last Admin: 12/11/18 06:18 Dose:  10 mg


Docusate Sodium (Colace)  100 mg PO BID [facility]


   Last Admin: 12/16/18 08:44 Dose:  100 mg


Gabapentin (Neurontin)  600 mg PO HS [facility]


   Last Admin: 12/15/18 21:49 Dose:  600 mg


Home Med (Enemeez)  5 ml [place] 0600 [facility]


   Last Admin: 12/16/18 06:17 Dose:  5 ml


Cefepime HCl 2 gm/ Sodium (Chloride)  100 mls @ 100 mls/hr IVPB 

Q8@0600,1400,2200 [facility]


   Last Admin: 12/16/18 06:16 Dose:  100 mls/hr


Vancomycin HCl 1 gm/ Sodium (Chloride)  250 mls @ 166.667 mls/hr IVPB Q12H [facility]


   Last Admin: 12/16/18 12:39 Dose:  166.667 mls/hr


Lactic Acid (Lac-Hydrin 12% Lotion (225 G))  1 applic TOP 0600,1800 [facility]


   Last Admin: 12/16/18 06:16 Dose:  1 applic


Lactulose (Enulose)  20 gm PO DAILY PRN


   PRN Reason: Constipation


   Last Admin: 12/10/18 16:57 Dose:  20 gm


Oxycodone HCl (Oxycodone Immediate Release Tab)  5 mg PO Q6 PRN


   PRN Reason: pain 5-10/10


   Last Admin: 12/03/18 11:25 Dose:  5 mg


Tizanidine HCl (Zanaflex)  8 mg PO HS [facility]


   Last Admin: 12/15/18 21:49 Dose:  8 mg











- Labs


Labs: 


                                        





                                 12/14/18 06:00 





                                 12/14/18 06:00 











- Constitutional


Appears: Non-toxic, Chronically Ill





- Head Exam


Head Exam: NORMOCEPHALIC





- Eye Exam


Eye Exam: absent: Scleral icterus





- ENT Exam


ENT Exam: Mucous Membranes Dry





- Neck Exam


Neck Exam: absent: Lymphadenopathy





- Respiratory Exam


Respiratory Exam: Decreased Breath Sounds





- Cardiovascular Exam


Cardiovascular Exam: REGULAR RHYTHM





- GI/Abdominal Exam


GI & Abdominal Exam: Distended





- Rectal Exam


Rectal Exam: Deferred





Assessment and Plan


(1) Back pain


Status: Acute   





(2) Discitis of cervical region


Status: Acute   





(3) Epidural abscess


Status: Acute   





(4) Osteomyelitis of cervical spine


Status: Acute   





- Assessment and Plan (Free Text)


Assessment: 





will renew IV antibiotics


cont rx for min 6-8 weeks with Neurosurg follow up

## 2018-12-17 NOTE — CP.PCM.PN
Subjective





- Date & Time of Evaluation


Date of Evaluation: 12/17/18


Time of Evaluation: 09:28





- Subjective


Subjective: 





CONTINUES TO IMPROVE WITH PT/OT


STILL HAS PROBLEMS WITH BOWEL MOVEMENTS


ABLE TO STAND WITH ASSISTANCE BUT UNABLE TO AMBULATE





Objective





- Vital Signs/Intake and Output


Vital Signs (last 24 hours): 


                                        











Temp Pulse Resp BP Pulse Ox


 


 98.2 F   82   20   114/65   95 


 


 12/17/18 08:05  12/17/18 08:05  12/17/18 08:05  12/17/18 08:05  12/17/18 08:05











- Medications


Medications: 


                               Current Medications





Acetaminophen (Tylenol 325mg Tab)  650 mg PO Q6 PRN


   PRN Reason: for pain scale 3-4


Aripiprazole (Abilify)  5 mg PO [facility]


   Last Admin: 12/16/18 21:18 Dose:  5 mg


Baclofen (Lioresal)  10 mg PO TID [facility]


   Last Admin: 12/17/18 08:06 Dose:  10 mg


Bisacodyl (Dulcolax)  10 mg [place] DAILY PRN


   PRN Reason: Constipation


   Last Admin: 12/11/18 06:18 Dose:  10 mg


Docusate Sodium (Colace)  100 mg PO BID [facility]


   Last Admin: 12/17/18 08:07 Dose:  100 mg


Gabapentin (Neurontin)  600 mg PO [facility]


   Last Admin: 12/16/18 21:19 Dose:  600 mg


Home Med (Enemeez)  5 ml [place] 0600 [facility]


   Last Admin: 12/17/18 05:37 Dose:  5 ml


Cefepime HCl 2 gm/ Sodium (Chloride)  100 mls @ 100 mls/hr IVPB 

Q8@0600,1400,2200 [facility]


   Last Admin: 12/17/18 05:37 Dose:  100 mls/hr


Vancomycin HCl 1 gm/ Sodium (Chloride)  250 mls @ 166.667 mls/hr IVPB Q12H [facility]


   Last Admin: 12/17/18 01:01 Dose:  166.667 mls/hr


Lactic Acid (Lac-Hydrin 12% Lotion (225 G))  1 applic TOP 0600,1800 [facility]


   Last Admin: 12/17/18 05:37 Dose:  1 applic


Lactulose (Enulose)  20 gm PO DAILY PRN


   PRN Reason: Constipation


   Last Admin: 12/10/18 16:57 Dose:  20 gm


Oxycodone HCl (Oxycodone Immediate Release Tab)  5 mg PO Q6 PRN


   PRN Reason: pain 5-10/10


   Last Admin: 12/03/18 11:25 Dose:  5 mg


Tizanidine HCl (Zanaflex)  8 mg PO HS [facility]


   Last Admin: 12/16/18 21:19 Dose:  8 mg











- Labs


Labs: 


                                        





                                 12/14/18 06:00 





                                 12/14/18 06:00 











- Constitutional


Appears: No Acute Distress





- Head Exam


Head Exam: ATRAUMATIC, NORMAL INSPECTION, NORMOCEPHALIC





- Eye Exam


Eye Exam: EOMI, Normal appearance, PERRL


Pupil Exam: NORMAL ACCOMODATION, PERRL





- ENT Exam


ENT Exam: Mucous Membranes Moist, Normal Exam





- Neck Exam


Neck Exam: Full ROM, Normal Inspection.  absent: Lymphadenopathy





- Respiratory Exam


Respiratory Exam: Clear to Ausculation Bilateral, NORMAL BREATHING PATTERN





- Cardiovascular Exam


Cardiovascular Exam: REGULAR RHYTHM, +S1, +S2.  absent: Murmur





- GI/Abdominal Exam


GI & Abdominal Exam: Soft, Normal Bowel Sounds.  absent: Tenderness





- Rectal Exam


Rectal Exam: NORMAL INSPECTION





- Extremities Exam


Extremities Exam: Full ROM, Normal Capillary Refill, Normal Inspection.  absent:

Joint Swelling, Pedal Edema





- Back Exam


Back Exam: NORMAL INSPECTION





- Neurological Exam


Neurological Exam: Abnormal Gait, Alert, Awake, CN II-XII Intact, Oriented x3


Additional comments: 





WEAK LOWER EXTREMITIES





- Psychiatric Exam


Psychiatric exam: Normal Affect, Normal Mood





- Skin


Skin Exam: Dry, Intact, Normal Color, Warm





Assessment and Plan





- Assessment and Plan (Free Text)


Assessment: 





DISCITIS


OSTEOMYELITIS


Plan: 





CONTINUE OT/PT


IV ANTIBIOTICS

## 2018-12-17 NOTE — CP.PCM.PN
Subjective





- Date & Time of Evaluation


Date of Evaluation: 12/17/18


Time of Evaluation: 18:54





- Subjective


Subjective: 





Patient seen in the room


in good spirits


not really taking the pain medications


got up in the parallel bars and even started with the platform RW


magic bullet is working well and will change to the evening around 10pm and see 

if he will then have a BM in the-am instead of during therapy


Mildred to come out





Objective





- Vital Signs/Intake and Output


Vital Signs (last 24 hours): 


                                        











Temp Pulse Resp BP Pulse Ox


 


 98.2 F   82   20   114/65   95 


 


 12/17/18 08:05  12/17/18 08:05  12/17/18 08:05  12/17/18 08:05  12/17/18 08:05











- Medications


Medications: 


                               Current Medications





Acetaminophen (Tylenol 325mg Tab)  650 mg PO Q6 PRN


   PRN Reason: for pain scale 3-4


Aripiprazole (Abilify)  5 mg PO HS [facility]


   Last Admin: 12/16/18 21:18 Dose:  5 mg


Baclofen (Lioresal)  10 mg PO TID [facility]


   Last Admin: 12/17/18 17:02 Dose:  10 mg


Bisacodyl (Dulcolax)  10 mg [place] DAILY PRN


   PRN Reason: Constipation


   Last Admin: 12/11/18 06:18 Dose:  10 mg


Docusate Sodium (Colace)  100 mg PO BID [facility]


   Last Admin: 12/17/18 17:02 Dose:  100 mg


Gabapentin (Neurontin)  600 mg PO HS [facility]


   Last Admin: 12/16/18 21:19 Dose:  600 mg


Home Med (Enemeez)  5 ml [place] 0600 [facility]


   Last Admin: 12/17/18 05:37 Dose:  5 ml


Cefepime HCl 2 gm/ Sodium (Chloride)  100 mls @ 100 mls/hr IVPB 

Q8@0600,1400,2200 [facility]


   Last Admin: 12/17/18 14:19 Dose:  100 mls/hr


Vancomycin HCl 1 gm/ Sodium (Chloride)  250 mls @ 166.667 mls/hr IVPB Q12H [facility]


   Last Admin: 12/17/18 12:32 Dose:  166.667 mls/hr


Lactic Acid (Lac-Hydrin 12% Lotion (225 G))  1 applic TOP 0600,1800 [facility]


   Last Admin: 12/17/18 17:04 Dose:  1 applic


Lactulose (Enulose)  20 gm PO DAILY PRN


   PRN Reason: Constipation


   Last Admin: 12/10/18 16:57 Dose:  20 gm


Oxycodone HCl (Oxycodone Immediate Release Tab)  5 mg PO Q6 PRN


   PRN Reason: pain 5-10/10


   Last Admin: 12/03/18 11:25 Dose:  5 mg


Tizanidine HCl (Zanaflex)  8 mg PO HS [facility]


   Last Admin: 12/16/18 21:19 Dose:  8 mg











- Labs


Labs: 


                                        





                                 12/14/18 06:00 





                                 12/14/18 06:00

## 2018-12-18 NOTE — PCM.PSYTMC
Acute Rehab Team Conference -





- Vital Signs:


Vital Signs (Last 8 Hours): 


                                   Vital Signs











  12/18/18 12/18/18





  07:53 10:00


 


Temperature 97.5 F L 97.5 F L


 


Pulse Rate 69 69


 


Respiratory 18 18





Rate  


 


Blood Pressure 124/72 124/72


 


O2 Sat by Pulse  98





Oximetry  











Pain: 0





- Precautions:


Precautions: Fall Prevention





- Medications/Other Issues:


Comment: Still on Vanco and Cefepime IV





- Consults:


Comment: Dr. Morales, Dr. Esqueda, Dr. Wiggins, Dr. Monzon





- Skin:


Incision Site: Posterior neck ( Nape )


Dressing Status: Clean, Dry, Intact


Incision: Healing Well, Well Approximated


Incision Line Treatment: Cleansed with NSS and covered with dry dressing BID.





- Toileting:


Toileting: Dependent





- Bladder Management:


Bladder Pattern: Incontinent


Voiding Method: Urinal, Diaper


Bladder Management: Dependent


Other Intervention:: >5





- Transfers:


Transfers: Moderate Assistance





- ADL's:


ADL's: Maximal Assistance





- Pain Management:


Other Intervention:: MSIR q6h PRN





- Patient/Family Teaching:


Other Intervention:: Care post Laminectomy , spinal and safety precautions





- Goals/Time Frame:


Comment: Per multidisciplinary care plan and goals





- Provider:


Registered Nurse:: Aydee Garcia





Physical Therapy





- Bed Mobility


Bed Mobility: Supervision, Verbal Cues, Contact Guard


Comment: -supine to/from sit: CG.  -rolling: CS





- Transfers


Wheelchair to Mat: Verbal Cues, Contact Guard


Sit to Stand: Maximum Assistance





- Ambulation


Distance (ft.): 10


Orthoses: *all gait completed with BLE knee immobilizers in place to assist with

stability at the knees and reduce effects of flexion tone to improve weight 

bearing.  *gait with LLE dorsiflexion wrap due to weakness


Comment: -10 feet with min/mod A in // bars x 3 trials.  -8 feet with mod A of 2

(total A) with BUE platform RW.  -requires cues/assistance for weight shifting 

and sequencing.  -assistance with platform RW for platform motion as well as 

progression/stability of LLE and initiation on RLE as well as stability of RLE 

during stance.  -buckling noted at BLE with anterior buckling on R side and L 

side flexion of heel.  -seated rest breaks between trials





- Stair Negotiation


Stairs: Level of Assistance: Dependent





- Standing Balance


Static Stand: Moderate Assistance


Comment: // bars, B knee immobilizers, uses BUE for support, limited LE weigh 

tbearing noted with limited LE activation





- Pain


Pain (assessed during therapy session): 0


Alleviating Techniques: Medication, Distraction, Exercise


Comment: denies





- Insight/Carryover


Insight/Carryover: Fair





- Patient/Family Education


Comment: -education completed on: therapy schedule, therapy goals, postural 

control, use of [place] J collar, integumentary changes, sensation changes, 

realistic goals, WC mobility, use of brakes, use of call bell





- Assessment/Plan


Assessment: Mr. Ken continues to make good progress in therapy.  Pt has much 

improved mood and partcipation over recent week.  Pt is ambulating with knee i

mmobilizers with max A of 2 with platform rolling walker progressing from 

parallel bars.  Patient continues to make slow but steady progress in therapies.

 Therapy time is limited by bowel movements during therapy in the morning.  

Patient continues to present with unrealistic goals for mobility at time of 

discharge.  Pt continues to have impaired strength, sensation and mobility in 

the BLEs.  PT recommends continued intensive therapies and medical management to

continue addressing needs s/p SCI.  PT recommends discharge to [facility] following 

completion of full length of stay in acute rehab.  Pt returned to room at end of

session with girlfriend present, all needs met and call bell in reach.





- Goals


Timeframe: 7 days


Goals: -TRANSFERS: lateral transfers without board with CS to each side with min

A for set-up.  -TRANSFERS: SPT with max A of 1 and mod A of 2nd person for 

safety.  -BED MOBILITY: rolling L and L w/ mod I.  -BED MOBILITY: supine to sit 

w/ S.  -BED MOBILITY: sit to supine w/ CS with use of appropriate 

assistive/lifting device.  supervision.  -GAIT: 15 feet with max A (mod A of 2) 

with B platform RW and B knee imobilizers.  -WHEELCHAIR: propel manual 

wheelchair x 200 feet on all surfaces with BUE with S.  -EDUCATION: care-taker 

to assist patient with bed/mat mobility and positioning with contact guard 

assistance from therapist





- Provider


Physical Therapist:: Elvia Bautista


License Number:: [id number]





Occupational Therapy





- Arousal/Attention/Orientation


Level of Consciousness: Awake, Alert


Patient Orientation: Person, Place, Time





- ADL/IADL


Self Feeding: Set-up Help


Grooming: Set-up Help


Bathing-Upper Ext: Supervision


Bathing-Lower Ext: Minimal Assistance


Dressing-Upper Ext: Supervision


Dressing-Lower Ext: Minimal Assistance


Comment: spongebathing performed.  dressing completed in long sitting





- Sitting Balance


Static Sitting: Supervision


Dynamic Sitting: Requires supervision





- Transfers


Wheelchair to Bed Transfers: Contact Guard


Comment: cga for lateral transfers





- Wheelchair Management


Level of Assistance: Modified Independent


Distance (ft.): 150





- Upper Extremity Status


Right Upper Extremity Comment: WFL


Left Upper Extremity Comment: WFL





- Pain


Pain (assessed during therapy session): 0





- Insight/Carryover


Insight/Carryover: Fair





- Patient/Family Education


Comment: -rehab/OT goals, limitations & plan of care.  -self care, 

transfers/mobility training uisng adaptive/compensatory strategies.  -inhibitory

strategies, weightbearng for spasms & flexor tone in BLES, extensor tone in 

trunk.  -w/c propulsion/management.  -pressure relief techniques in w/c & bed; 

continued education.  -adaptive devices: reacher, dressing for lower body 

dressing, leglifter for bed mobility/positioning BLES while in bed.  -w/c & bed 

positioning.  -skin checks/inspections B heel, sacral area





- Assessment/Plan


Assessment: Pt is a 33 year old R handed male with dx: cervical abcess, 

osteomylelitis s/p C5-C6 and C6-C7 laminectomy.  *PRECACUTIONS: spinal 

precautions, falls, bed & w/c positioning/skin intergrity, + easton, cervical 

brace at all times.  Pt limited by the following impairments: spasms in B LEs & 

trunk, impaired BUES  core/trunk strength, impaired BLES strength, involuntary 

movements and impaired safety, impaired activity tolerance/endurance--which 

imapct on functional performance of self care, transfers/mobility and IAdls. Pt 

will continue skilled OT to adddress functional impairments to maxmize function 

in self care/mobility using compensatory strategies.  patient is currently 

completing ub adls with supervision , feeding/grooming with set-up help.  lb 

adls patient is able to complete lb dressing in long sitting , and lb 

spongebathing in long sitting with min A.  patient is dependent currently for 

toileting 2' frequent urinary/fecal incontence episodes requiring total assist 

for hygiene an dclothing mgmt.  .  *Goal: Mod  I from w/c level for self care, 

transfers/mobility and Iadls with assistive devices.





- Goals


Timeframe: 1 week


Comment: *Goal: Mod  I from w/c level for self care, transfers/mobility and 

Iadls with assistive devices.





- Provider


Occupational Therapist:: Zenobia Christine


License Number: [id number]





Recreational Therapy





- Participation


Participation: Participates in Individual and/or Group Sessions





- Attendance


Attendance: 3-5 times per week





- Activities


Leisure Activities: Cards and Games





- Socialization


Level of Socialization: Initiates/interacts freely with care givers and peer





- Diversional Time


Diversional Time: dominoes, rummikub, video games





- Assessment


Assessment/Plan: Pt is agreeable to participate in recreation therapy sessions. 

Pt will self-propel to recreation room and participate in dominoes task, 

rummikub, and other leisure tasks. Pt verbalizes enjoyment and is independent 

with leisure tasks following demonstration. Pt plays video games during his free

time for diversion. Pt will continue to benefit from participating in recreation

therapy sessions following encouragement.


Problems Currently Limiting Participation: spinal precautions, neck collar brace

to be worn at all times, B/L LE weakness, decrease leisure awareness level, 

decrease leisure awareness level, decrease activity tolerance level


Goals and Time Frame: Pt will be encouraged to participate in 1:1 and group 

recreation therapy sessions in wheelchair or at bedside 3-5x week to improve 

activity tolerance level, leisure awareness level, diversion, and attention to 

task by date of discharge.





- Provider


Therapist: Gisela Garza





Nutrition





- Current Diet


Current Diet/Supplement/Feedings: Regular diet





- Appetite


Percent Meal Consumed: %





- Comments


Comments: Pt reports constipation x 1 week.





- Assessment/Goals/Time Frame


Assessments/Goals/Time Frame: Pt at low nutritional risk.  no goals.  Follow-up 

due on 12/26/2018





- Provider


Provider: Britt Avitia





Case Management





- Psychosocial Assessment


Support Systems: Juliet Younger (significant other) - [phone]


Psychological Interventions/Needs: Patient is AAOx3.


Discharge Concerns: Patient is mod I with wheelchair mobility.


Patient/Family Meeting: CM met with patient and rehab team.


Intervention/Goal/Outcome: 1. Goal: Contact Guard 2. Plan: [facility] at [place] 3.

coordinate transportation for transfer on day of discharge 4. prepare chart copy

for [facility] 5. continued emotional support





- Discharge Plan


Discharge Plan: Subacute care





- Provider


Provider: Aranza Rosenthal


License Number: [id number]





Rehabilitation Plan





- Treatment Plan


Treatment Plan: Physical Therapy, Occupational Therapy, Dietary, Patient/Family 

Education





- Discharge Plan


Estimated Date of Discharge: 12/20/18


Discharge to: Subacute

## 2018-12-18 NOTE — CP.PCM.PN
Subjective





- Date & Time of Evaluation


Date of Evaluation: 12/18/18


Time of Evaluation: 08:37





- Subjective


Subjective: 





no complaints


no distress


vss





Objective





- Vital Signs/Intake and Output


Vital Signs (last 24 hours): 


                                        











Temp Pulse Resp BP Pulse Ox


 


 97.5 F L  69   18   124/72   97 


 


 12/18/18 07:53  12/18/18 07:53  12/18/18 07:53  12/18/18 07:53  12/17/18 19:54











- Medications


Medications: 


                               Current Medications





Acetaminophen (Tylenol 325mg Tab)  650 mg PO Q6 PRN


   PRN Reason: for pain scale 3-4


Aripiprazole (Abilify)  5 mg PO [facility]


   Last Admin: 12/17/18 21:20 Dose:  5 mg


Baclofen (Lioresal)  10 mg PO TID [facility]


   Last Admin: 12/18/18 08:28 Dose:  10 mg


Docusate Sodium (Colace)  100 mg PO BID [facility]


   Last Admin: 12/18/18 08:27 Dose:  100 mg


Gabapentin (Neurontin)  600 mg PO [facility]


   Last Admin: 12/17/18 21:20 Dose:  600 mg


Home Med (Enemeez)  5 ml MS HS [facility]


   Last Admin: 12/17/18 21:45 Dose:  5 ml


Cefepime HCl 2 gm/ Sodium (Chloride)  100 mls @ 100 mls/hr IVPB 

Q8@0600,1400,2200 [facility]


   Last Admin: 12/18/18 05:17 Dose:  100 mls/hr


Vancomycin HCl 1 gm/ Sodium (Chloride)  250 mls @ 166.667 mls/hr IVPB Q12H [facility]


   Last Admin: 12/18/18 00:15 Dose:  166.667 mls/hr


Lactic Acid (Lac-Hydrin 12% Lotion (225 G))  1 applic TOP 0600,1800 [facility]


   Last Admin: 12/18/18 05:18 Dose:  1 applic


Lactulose (Enulose)  20 gm PO DAILY PRN


   PRN Reason: Constipation


   Last Admin: 12/10/18 16:57 Dose:  20 gm


Oxycodone HCl (Oxycodone Immediate Release Tab)  5 mg PO Q6 PRN


   PRN Reason: pain 5-10/10


   Last Admin: 12/03/18 11:25 Dose:  5 mg


Tizanidine HCl (Zanaflex)  8 mg PO [facility]


   Last Admin: 12/17/18 21:20 Dose:  8 mg











- Labs


Labs: 


                                        





                                 12/14/18 06:00 





                                 12/14/18 06:00 











- Constitutional


Appears: No Acute Distress





- Head Exam


Head Exam: ATRAUMATIC, NORMAL INSPECTION, NORMOCEPHALIC





- Eye Exam


Eye Exam: EOMI, Normal appearance, PERRL


Pupil Exam: NORMAL ACCOMODATION, PERRL





- ENT Exam


ENT Exam: Mucous Membranes Moist, Normal Exam





- Neck Exam


Neck Exam: Full ROM, Normal Inspection.  absent: Lymphadenopathy





- Respiratory Exam


Respiratory Exam: Clear to Ausculation Bilateral, NORMAL BREATHING PATTERN





- Cardiovascular Exam


Cardiovascular Exam: REGULAR RHYTHM, +S1, +S2.  absent: Murmur





- GI/Abdominal Exam


GI & Abdominal Exam: Soft, Normal Bowel Sounds.  absent: Tenderness





- Rectal Exam


Rectal Exam: NORMAL INSPECTION





- Extremities Exam


Extremities Exam: Full ROM, Normal Capillary Refill, Normal Inspection.  absent:

Joint Swelling, Pedal Edema





- Back Exam


Back Exam: NORMAL INSPECTION





- Neurological Exam


Neurological Exam: Abnormal Gait, Alert, Awake, CN II-XII Intact, Oriented x3


Additional comments: 





weakness of lower extremities





- Psychiatric Exam


Psychiatric exam: Normal Affect, Normal Mood





- Skin


Skin Exam: Dry, Intact, Normal Color, Warm





Assessment and Plan





- Assessment and Plan (Free Text)


Assessment: 





osteomyelitis of spine


discitis


Plan: 





continue aggressive physical and occupational therapy


iv antibiotics

## 2018-12-18 NOTE — CP.PCM.PN
Subjective





- Date & Time of Evaluation


Date of Evaluation: 12/18/18


Time of Evaluation: 12:00





- Subjective


Subjective: 





Neurology Follow-Up Note:





Mr. Ken was evaluated this morning with significant other present.  Today he 

states that he is feeling good.  He is pending subacute placement and is nervous

about that transition.  Staples were recently removed.  He admits to improvement

in paresthesias to the left hand and left foot.  Denies any new complaints.  

Denies h/a, dizziness, visual changes, chest pain, shortness of breath, calf 

pain/swelling, abd pain, n/v/d.








Objective





- Vital Signs/Intake and Output


Vital Signs (last 24 hours): 


                                        











Temp Pulse Resp BP Pulse Ox


 


 97.5 F L  69   18   124/72   98 


 


 12/18/18 10:00  12/18/18 10:00  12/18/18 10:00  12/18/18 10:00  12/18/18 10:00











- Medications


Medications: 


                               Current Medications





Acetaminophen (Tylenol 325mg Tab)  650 mg PO Q6 PRN


   PRN Reason: for pain scale 3-4


Aripiprazole (Abilify)  5 mg PO HS [facility]


   Last Admin: 12/17/18 21:20 Dose:  5 mg


Baclofen (Lioresal)  10 mg PO TID [facility]


   Last Admin: 12/18/18 08:28 Dose:  10 mg


Docusate Sodium (Colace)  100 mg PO BID [facility]


   Last Admin: 12/18/18 08:27 Dose:  100 mg


Gabapentin (Neurontin)  600 mg PO HS [facility]


   Last Admin: 12/17/18 21:20 Dose:  600 mg


Home Med (Enemeez)  5 ml [place] [facility]


   Last Admin: 12/17/18 21:45 Dose:  5 ml


Cefepime HCl 2 gm/ Sodium (Chloride)  100 mls @ 100 mls/hr IVPB 

Q8@0600,1400,2200 [facility]


   Last Admin: 12/18/18 05:17 Dose:  100 mls/hr


Vancomycin HCl 1 gm/ Sodium (Chloride)  250 mls @ 166.667 mls/hr IVPB Q12H [facility]


   Last Admin: 12/18/18 00:15 Dose:  166.667 mls/hr


Lactic Acid (Lac-Hydrin 12% Lotion (225 G))  1 applic TOP 0600,1800 [facility]


   Last Admin: 12/18/18 05:18 Dose:  1 applic


Lactulose (Enulose)  20 gm PO DAILY PRN


   PRN Reason: Constipation


   Last Admin: 12/10/18 16:57 Dose:  20 gm


Oxycodone HCl (Oxycodone Immediate Release Tab)  5 mg PO Q6 PRN


   PRN Reason: pain 5-10/10


   Last Admin: 12/03/18 11:25 Dose:  5 mg


Tizanidine HCl (Zanaflex)  8 mg PO HS [facility]


   Last Admin: 12/17/18 21:20 Dose:  8 mg











- Labs


Labs: 


                                        





                                 12/14/18 06:00 





                                 12/14/18 06:00 











- Constitutional


Appears: Well, Non-toxic, No Acute Distress





- Head Exam


Head Exam: ATRAUMATIC, NORMAL INSPECTION, NORMOCEPHALIC





- Eye Exam


Eye Exam: EOMI, Normal appearance


Pupil Exam: NORMAL ACCOMODATION, PERRL





- ENT Exam


ENT Exam: Mucous Membranes Moist





- Neck Exam


Additional comments: 





[place] J-Collar maintained





- Respiratory Exam


Respiratory Exam: NORMAL BREATHING PATTERN





- Extremities Exam


Extremities Exam: absent: Calf Tenderness, Full ROM, Pedal Edema


Additional comments: 





still has decreased ROM to BLE, however, much improved.


Extension of LLE 4/5;  + quad weakness noted to LLE, unable to lift thigh with 

minimal to no resistance. 








- Neurological Exam


Neurological Exam: Abnormal Gait, Alert, Awake, CN II-XII Intact, Oriented x3


Neuro motor strength exam: Left Upper Extremity: 5, Right Upper Extremity: 5, 

Left Lower Extremity: 4, Right Lower Extremity: 4


Additional comments: 


still has hyperflexia ble, left greater than right.


sensation intact, including to T12 area.


no hypertonicity noted.


extension of LLE 4/5;  + quad weakness noted to LLE, unable to lift thigh with 

minimal to no resistance. 





- Psychiatric Exam


Psychiatric exam: Normal Affect, Normal Mood





- Skin


Skin Exam: Normal Color





Assessment and Plan


(1) Epidural abscess


Assessment & Plan: 


Mr. Ken continues to improve neurologically and functionally.





-Paresthesias to left hand and left foot likely neuropathic.  Continue Neurontin

to 600 mg PO HS.  


-Continue PT/OT therapy.


-Removal of [place] J-collar deferred to neurosurgery.


-Continue TEDS and elevation of BLE while in bed at night to decrease swelling 

to ankles


-D/c planning to subacute when appropriate.


-Please notify neuro team of any acute changes in condition.


-We will continue to follow the pt while in rehab.





Case discussed with Dr. Houston


Status: Acute

## 2018-12-18 NOTE — CP.PCM.PN
Subjective





- Date & Time of Evaluation


Date of Evaluation: 12/18/18


Time of Evaluation: 13:23





- Subjective


Subjective: 





Patient seen in the room


aware that d/c is planned 12/20/18


denies pain and pain meds stopped


no BM yet today


continue current care





Objective





- Vital Signs/Intake and Output


Vital Signs (last 24 hours): 


                                        











Temp Pulse Resp BP Pulse Ox


 


 97.5 F L  69   18   124/72   98 


 


 12/18/18 10:00  12/18/18 10:00  12/18/18 10:00  12/18/18 10:00  12/18/18 10:00











- Medications


Medications: 


                               Current Medications





Acetaminophen (Tylenol 325mg Tab)  650 mg PO Q6 PRN


   PRN Reason: for pain scale 3-4


Aripiprazole (Abilify)  5 mg PO HS [facility]


   Last Admin: 12/17/18 21:20 Dose:  5 mg


Baclofen (Lioresal)  10 mg PO TID [facility]


   Last Admin: 12/18/18 12:25 Dose:  10 mg


Docusate Sodium (Colace)  100 mg PO BID [facility]


   Last Admin: 12/18/18 08:27 Dose:  100 mg


Gabapentin (Neurontin)  600 mg PO HS [facility]


   Last Admin: 12/17/18 21:20 Dose:  600 mg


Home Med (Enemeez)  5 ml [place] HS [facility]


   Last Admin: 12/17/18 21:45 Dose:  5 ml


Cefepime HCl 2 gm/ Sodium (Chloride)  100 mls @ 100 mls/hr IVPB 

Q8@0600,1400,2200 [facility]


   Last Admin: 12/18/18 05:17 Dose:  100 mls/hr


Vancomycin HCl 1 gm/ Sodium (Chloride)  250 mls @ 166.667 mls/hr IVPB Q12H [facility]


   Last Admin: 12/18/18 12:26 Dose:  166.667 mls/hr


Lactic Acid (Lac-Hydrin 12% Lotion (225 G))  1 applic TOP 0600,1800 [facility]


   Last Admin: 12/18/18 05:18 Dose:  1 applic


Lactulose (Enulose)  20 gm PO DAILY PRN


   PRN Reason: Constipation


   Last Admin: 12/10/18 16:57 Dose:  20 gm


Oxycodone HCl (Oxycodone Immediate Release Tab)  5 mg PO Q6 PRN


   PRN Reason: pain 5-10/10


   Last Admin: 12/03/18 11:25 Dose:  5 mg


Tizanidine HCl (Zanaflex)  8 mg PO HS [facility]


   Last Admin: 12/17/18 21:20 Dose:  8 mg











- Labs


Labs: 


                                        





                                 12/14/18 06:00 





                                 12/14/18 06:00
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
## 2018-12-19 NOTE — CP.PCM.PCO
Physician Communication Note





- Physician Communication Note


Physician Communication Note: patient has infected upper molar right side - 

needs OMFS eval

## 2018-12-19 NOTE — CP.PCM.PN
Subjective





- Date & Time of Evaluation


Date of Evaluation: 12/19/18


Time of Evaluation: 07:00





- Subjective


Subjective: 





needs OMFS eval'


consider transfer to [facility] for this- infected broken upper right molar 





Objective





- Vital Signs/Intake and Output


Vital Signs (last 24 hours): 


                                        











Temp Pulse Resp BP Pulse Ox


 


 90.2 F L  97 H  20   123/67   99 


 


 12/19/18 07:38  12/19/18 07:38  12/19/18 07:38  12/19/18 07:38  12/19/18 07:38











- Medications


Medications: 


                               Current Medications





Acetaminophen (Tylenol 325mg Tab)  650 mg PO Q6 PRN


   PRN Reason: for pain scale 3-4


Aripiprazole (Abilify)  5 mg PO HS [facility]


   Last Admin: 12/18/18 21:26 Dose:  5 mg


Baclofen (Lioresal)  10 mg PO TID [facility]


   Last Admin: 12/19/18 12:55 Dose:  10 mg


Benzocaine/Menthol (Cepacol Sore Throat)  1 doris PO Q3 PRN


   PRN Reason: Sore Throat


Docusate Sodium (Colace)  100 mg PO BID [facility]


   Last Admin: 12/19/18 08:19 Dose:  100 mg


Gabapentin (Neurontin)  600 mg PO HS [facility]


   Last Admin: 12/18/18 21:29 Dose:  600 mg


Home Med (Enemeez)  5 ml IN HS [facility]


   Last Admin: 12/18/18 21:27 Dose:  5 ml


Cefepime HCl 2 gm/ Sodium (Chloride)  100 mls @ 100 mls/hr IVPB 

Q8@0600,1400,2200 [facility]


   Last Admin: 12/19/18 05:44 Dose:  100 mls/hr


Vancomycin HCl 1 gm/ Sodium (Chloride)  250 mls @ 166.667 mls/hr IVPB Q12H [facility]


   Last Admin: 12/19/18 12:47 Dose:  166.667 mls/hr


Lactic Acid (Lac-Hydrin 12% Lotion (225 G))  1 applic TOP 0600,1800 [facility]


   Last Admin: 12/19/18 05:45 Dose:  1 applic


Lactulose (Enulose)  20 gm PO DAILY PRN


   PRN Reason: Constipation


   Last Admin: 12/10/18 16:57 Dose:  20 gm


Oxycodone HCl (Oxycodone Immediate Release Tab)  5 mg PO Q6 PRN


   PRN Reason: pain 5-10/10


   Last Admin: 12/03/18 11:25 Dose:  5 mg


Tizanidine HCl (Zanaflex)  8 mg PO HS [facility]


   Last Admin: 12/18/18 21:29 Dose:  8 mg











- Labs


Labs: 


                                        





                                 12/14/18 06:00 





                                 12/14/18 06:00 











Assessment and Plan


(1) Back pain


Status: Acute   





(2) Discitis of cervical region


Status: Acute   





(3) Epidural abscess


Status: Acute   





(4) Osteomyelitis of cervical spine


Status: Acute

## 2018-12-19 NOTE — CP.PCM.PN
Subjective





- Date & Time of Evaluation


Date of Evaluation: 12/19/18


Time of Evaluation: 10:58





- Subjective


Subjective: 





CONTINUES TO SHOW CLINICAL IMPROVEMENT


SCHEDULED FOR TRANSFER TO SUBACUTE CARE IN AM


WILL CONTINUE CURRENT RX





Objective





- Vital Signs/Intake and Output


Vital Signs (last 24 hours): 


                                        











Temp Pulse Resp BP Pulse Ox


 


 90.2 F L  97 H  20   123/67   99 


 


 12/19/18 07:38  12/19/18 07:38  12/19/18 07:38  12/19/18 07:38  12/19/18 07:38











- Medications


Medications: 


                               Current Medications





Acetaminophen (Tylenol 325mg Tab)  650 mg PO Q6 PRN


   PRN Reason: for pain scale 3-4


Aripiprazole (Abilify)  5 mg PO HS [facility]


   Last Admin: 12/18/18 21:26 Dose:  5 mg


Baclofen (Lioresal)  10 mg PO TID [facility]


   Last Admin: 12/19/18 08:19 Dose:  10 mg


Docusate Sodium (Colace)  100 mg PO BID [facility]


   Last Admin: 12/19/18 08:19 Dose:  100 mg


Gabapentin (Neurontin)  600 mg PO HS [facility]


   Last Admin: 12/18/18 21:29 Dose:  600 mg


Home Med (Enemeez)  5 ml OR HS [facility]


   Last Admin: 12/18/18 21:27 Dose:  5 ml


Cefepime HCl 2 gm/ Sodium (Chloride)  100 mls @ 100 mls/hr IVPB 

Q8@0600,1400,2200 [facility]


   Last Admin: 12/19/18 05:44 Dose:  100 mls/hr


Vancomycin HCl 1 gm/ Sodium (Chloride)  250 mls @ 166.667 mls/hr IVPB Q12H [facility]


   Last Admin: 12/19/18 00:23 Dose:  166.667 mls/hr


Lactic Acid (Lac-Hydrin 12% Lotion (225 G))  1 applic TOP 0600,1800 [facility]


   Last Admin: 12/19/18 05:45 Dose:  1 applic


Lactulose (Enulose)  20 gm PO DAILY PRN


   PRN Reason: Constipation


   Last Admin: 12/10/18 16:57 Dose:  20 gm


Oxycodone HCl (Oxycodone Immediate Release Tab)  5 mg PO Q6 PRN


   PRN Reason: pain 5-10/10


   Last Admin: 12/03/18 11:25 Dose:  5 mg


Tizanidine HCl (Zanaflex)  8 mg PO HS [facility]


   Last Admin: 12/18/18 21:29 Dose:  8 mg











- Labs


Labs: 


                                        





                                 12/14/18 06:00 





                                 12/14/18 06:00 











Assessment and Plan





- Assessment and Plan (Free Text)


Assessment: 





DISCITIS


OSTEOMYELITIS


S/P EVACUATION OF EPIDURAL ABSCESS


Plan: 





CONTINUE CURRENT RX


DISCHARGE TO SUBACUTE CARE IN AM

## 2018-12-20 VITALS
TEMPERATURE: 98.1 F | DIASTOLIC BLOOD PRESSURE: 75 MMHG | SYSTOLIC BLOOD PRESSURE: 126 MMHG | HEART RATE: 75 BPM | OXYGEN SATURATION: 97 % | RESPIRATION RATE: 18 BRPM

## 2018-12-20 NOTE — CP.PCM.DIS
Provider





- Provider


Date of Admission: 


11/30/18 15:54





Attending physician: 


Ashish Nieto MD





Consults: 








11/30/18 16:44


Physiatry Consult Routine 


   Comment: 


   Consulting Provider: Yuri Morales


   Consulting Physician: Yuri Morales


   Reason for Consult: Cervical Epidural Abscess s/p Cervical Laminectomy C4-C7





11/30/18 16:54


Infectious Disease Consult Routine 


   Comment: 


   Consulting Provider: Artie Esqueda


   Consulting Physician: Artie Esqueda


   Reason for Consult: Cervical Epidural Abscess s/p Cervical Laminectomy C4-C7


Neuro Surgery Consult Routine 


   Comment: 


   Consulting Provider: Alexis Monzon


   Consulting Physician: Alexis Monzon


   Reason for Consult: Cervical Epidural Abscess s/p Cervical Laminectomy C4-C7


Neurology Consult Routine 


   Comment: 


   Consulting Provider: Festus Anderson


   Consulting Physician: Festus Anderson


   Reason for Consult: Cervical Epidural Abscess s/p Cervical Laminectomy C4-C7





11/30/18 17:02


Case Management Referral Routine 


   Comment: 


   Physician Instructions: 


   Reason For Exam: Eval & Treat s/p Cervical Epidural Abscess


   Reason for Referral: Discharge Planning





12/03/18 09:15


Psychiatry Consult Routine 


   Comment: 


   Consulting Provider: Jass Wiggins


   Consulting Physician: Jass Wiggins


   Reason for Consult: ANXIETY/DEPRESSION





12/04/18 07:32


Podiatry Consult Routine 


   Comment: 


   Consulting Provider: Delon Day


   Consulting Physician: Delon Day


   Reason for Consult: LONG TOE NAILS, DRY, ROUGH SKIN BOTH FEET











Time Spent in preparation of Discharge (in minutes): 30





Diagnosis





- Discharge Diagnosis


(1) Anemia


Status: Acute   





(2) Weakness of both lower extremities


Status: Acute   





(3) Discitis of cervical region


Status: Acute   





(4) Epidural abscess


Status: Acute   





(5) Osteomyelitis of cervical spine


Status: Acute   





(6) Torticollis


Status: Acute   





Hospital Course





- Lab Results


Lab Results: 


                             Most Recent Lab Values











WBC  9.3 K/uL (4.8-10.8)   12/14/18  06:00    


 


RBC  3.80 Mil/uL (4.40-5.90)  L  12/14/18  06:00    


 


Hgb  11.8 g/dL (12.0-18.0)  L  12/14/18  06:00    


 


Hct  35.9 % (35.0-51.0)   12/14/18  06:00    


 


MCV  94.3 fl (80.0-94.0)  H  12/14/18  06:00    


 


MCH  30.9 pg (27.0-31.0)   12/14/18  06:00    


 


MCHC  32.8 g/dL (33.0-37.0)  L  12/14/18  06:00    


 


RDW  14.2 % (11.5-14.5)   12/14/18  06:00    


 


Plt Count  194 K/uL (130-400)  D 12/14/18  06:00    


 


MPV  6.9 fl (7.2-11.7)  L  12/14/18  06:00    


 


Neut % (Auto)  68.3 % (50.0-75.0)   12/14/18  06:00    


 


Lymph % (Auto)  15.4 % (20.0-40.0)  L  12/14/18  06:00    


 


Mono % (Auto)  11.2 % (0.0-10.0)  H  12/14/18  06:00    


 


Eos % (Auto)  4.3 % (0.0-4.0)  H  12/14/18  06:00    


 


Baso % (Auto)  0.8 % (0.0-2.0)   12/14/18  06:00    


 


Neut # (Auto)  6.4 K/uL (1.8-7.0)   12/14/18  06:00    


 


Lymph # (Auto)  1.4 K/uL (1.0-4.3)   12/14/18  06:00    


 


Mono # (Auto)  1.0 K/uL (0.0-0.8)  H  12/14/18  06:00    


 


Eos # (Auto)  0.4 K/uL (0.0-0.7)   12/14/18  06:00    


 


Baso # (Auto)  0.1 K/uL (0.0-0.2)   12/14/18  06:00    


 


Neutrophils % (Manual)  90 % (42-75)  H  12/09/18  06:00    


 


Lymphocytes % (Manual)  6 % (20-50)  L  12/09/18  06:00    


 


Monocytes % (Manual)  4 % (0-10)   12/09/18  06:00    


 


Platelet Estimate  Normal  (NORMAL)   12/09/18  06:00    


 


RBC Morphology  Normal  (NORMAL)   12/03/18  05:35    


 


Anisocytosis (manual)  Slight   12/09/18  06:00    


 


Microcytosis (manual)  Slight   12/09/18  06:00    


 


ESR  28 mm/hr (0-15)  H  12/14/18  06:00    


 


Sodium  139 mmol/l (132-148)   12/14/18  06:00    


 


Potassium  4.1 MMOL/L (3.6-5.0)   12/14/18  06:00    


 


Chloride  103 mmol/L ()   12/14/18  06:00    


 


Carbon Dioxide  27 mmol/L (22-30)   12/14/18  06:00    


 


Anion Gap  13  (10-20)   12/14/18  06:00    


 


BUN  24 mg/dl (9-20)  H  12/14/18  06:00    


 


Creatinine  0.9 mg/dl (0.8-1.5)   12/14/18  06:00    


 


Est GFR ( Amer)  > 60   12/14/18  06:00    


 


Est GFR (Non-Af Amer)  > 60   12/14/18  06:00    


 


Random Glucose  96 mg/dL ()   12/14/18  06:00    


 


Calcium  8.9 mg/dL (8.4-10.2)   12/14/18  06:00    


 


Total Bilirubin  0.2 mg/dl (0.2-1.3)   12/14/18  06:00    


 


AST  24 U/L (17-59)   12/14/18  06:00    


 


ALT  39 U/L (21-72)   12/14/18  06:00    


 


Alkaline Phosphatase  54 U/L ()   12/14/18  06:00    


 


C-Reactive Protein  < 5.00 mg/L (0.0-9.9)   12/14/18  06:00    


 


Total Protein  6.3 G/DL (6.3-8.2)   12/14/18  06:00    


 


Albumin  3.3 g/dL (3.5-5.0)  L  12/14/18  06:00    


 


Globulin  3.0 gm/dL (2.2-3.9)   12/14/18  06:00    


 


Albumin/Globulin Ratio  1.1  (1.0-2.1)   12/14/18  06:00    


 


Vancomycin Trough  7.0 ug/mL (5.0-10.0)   12/14/18  12:30    














- Hospital Course


Hospital Course: 





CLINICALLY IMPROVING WITH IV ANTIBIOTICS AND PT/OT


BETTER URINARY BLADDER CONTROL


STILL HAS PROBLEMS WITH BOWEL MOVEMENTS AND WALKING





Discharge Exam





- Head Exam


Head Exam: ATRAUMATIC, NORMAL INSPECTION, NORMOCEPHALIC





- Eye Exam


Eye Exam: EOMI, Normal appearance, PERRL


Pupil Exam: NORMAL ACCOMODATION, PERRL





- GI/Abdominal Exam


GI & Abdominal Exam: Normal Bowel Sounds





- Rectal Exam


Rectal Exam: NORMAL INSPECTION





- Extremities Exam


Additional comments: 





LOWER EXTREMITY WEAKNESS





- Neurological Exam


Neurological exam: Abnormal Gait, Alert, CN II-XII Intact, Oriented x3





- Psychiatric Exam


Psychiatric exam: Normal Affect, Normal Mood





- Skin


Skin Exam: Dry, Intact, Normal Color, Warm





Discharge Plan





- Follow Up Plan


Condition: GOOD


Disposition: HOME/ ROUTINE


Additional Instructions: 


CONTINUE AGRESSIVE REHAB


FOR TRANSFER TO ANOTHER INSTITUTION TODAY